# Patient Record
Sex: FEMALE | Race: WHITE | NOT HISPANIC OR LATINO | Employment: FULL TIME | URBAN - METROPOLITAN AREA
[De-identification: names, ages, dates, MRNs, and addresses within clinical notes are randomized per-mention and may not be internally consistent; named-entity substitution may affect disease eponyms.]

---

## 2018-01-03 ENCOUNTER — HOSPITAL ENCOUNTER (EMERGENCY)
Facility: CLINIC | Age: 23
Discharge: HOME | End: 2018-01-03
Attending: EMERGENCY MEDICINE | Admitting: EMERGENCY MEDICINE
Payer: COMMERCIAL

## 2018-01-03 ENCOUNTER — APPOINTMENT (OUTPATIENT)
Dept: CT IMAGING | Facility: CLINIC | Age: 23
End: 2018-01-03
Attending: EMERGENCY MEDICINE
Payer: COMMERCIAL

## 2018-01-03 VITALS
SYSTOLIC BLOOD PRESSURE: 107 MMHG | RESPIRATION RATE: 16 BRPM | TEMPERATURE: 98.3 F | HEART RATE: 75 BPM | OXYGEN SATURATION: 99 % | DIASTOLIC BLOOD PRESSURE: 66 MMHG

## 2018-01-03 DIAGNOSIS — K51.919 ULCERATIVE COLITIS WITH COMPLICATION, UNSPECIFIED LOCATION (H): ICD-10-CM

## 2018-01-03 DIAGNOSIS — R10.30 LOWER ABDOMINAL PAIN: ICD-10-CM

## 2018-01-03 LAB
ALBUMIN SERPL-MCNC: 2.7 G/DL (ref 3.4–5)
ALBUMIN UR-MCNC: NEGATIVE MG/DL
ALP SERPL-CCNC: 46 U/L (ref 40–150)
ALT SERPL W P-5'-P-CCNC: 14 U/L (ref 0–50)
ANION GAP SERPL CALCULATED.3IONS-SCNC: 6 MMOL/L (ref 3–14)
APPEARANCE UR: CLEAR
AST SERPL W P-5'-P-CCNC: 11 U/L (ref 0–45)
BACTERIA #/AREA URNS HPF: ABNORMAL /HPF
BASOPHILS # BLD AUTO: 0.1 10E9/L (ref 0–0.2)
BASOPHILS NFR BLD AUTO: 1.1 %
BILIRUB SERPL-MCNC: 0.3 MG/DL (ref 0.2–1.3)
BILIRUB UR QL STRIP: NEGATIVE
BUN SERPL-MCNC: 7 MG/DL (ref 7–30)
CALCIUM SERPL-MCNC: 8.3 MG/DL (ref 8.5–10.1)
CHLORIDE SERPL-SCNC: 103 MMOL/L (ref 94–109)
CO2 SERPL-SCNC: 25 MMOL/L (ref 20–32)
COLOR UR AUTO: YELLOW
CREAT BLD-MCNC: 0.8 MG/DL (ref 0.52–1.04)
CREAT SERPL-MCNC: 0.73 MG/DL (ref 0.52–1.04)
DIFFERENTIAL METHOD BLD: ABNORMAL
EOSINOPHIL # BLD AUTO: 2 10E9/L (ref 0–0.7)
EOSINOPHIL NFR BLD AUTO: 18.2 %
ERYTHROCYTE [DISTWIDTH] IN BLOOD BY AUTOMATED COUNT: 15.7 % (ref 10–15)
GFR SERPL CREATININE-BSD FRML MDRD: 89 ML/MIN/1.7M2
GFR SERPL CREATININE-BSD FRML MDRD: >90 ML/MIN/1.7M2
GLUCOSE SERPL-MCNC: 89 MG/DL (ref 70–99)
GLUCOSE UR STRIP-MCNC: NEGATIVE MG/DL
HCG UR QL: NEGATIVE
HCT VFR BLD AUTO: 37 % (ref 35–47)
HGB BLD-MCNC: 12.1 G/DL (ref 11.7–15.7)
HGB UR QL STRIP: NEGATIVE
IMM GRANULOCYTES # BLD: 0.1 10E9/L (ref 0–0.4)
IMM GRANULOCYTES NFR BLD: 0.5 %
KETONES UR STRIP-MCNC: 5 MG/DL
LEUKOCYTE ESTERASE UR QL STRIP: NEGATIVE
LIPASE SERPL-CCNC: 127 U/L (ref 73–393)
LYMPHOCYTES # BLD AUTO: 2.2 10E9/L (ref 0.8–5.3)
LYMPHOCYTES NFR BLD AUTO: 19.8 %
MCH RBC QN AUTO: 29.5 PG (ref 26.5–33)
MCHC RBC AUTO-ENTMCNC: 32.7 G/DL (ref 31.5–36.5)
MCV RBC AUTO: 90 FL (ref 78–100)
MONOCYTES # BLD AUTO: 0.9 10E9/L (ref 0–1.3)
MONOCYTES NFR BLD AUTO: 8.3 %
MUCOUS THREADS #/AREA URNS LPF: PRESENT /LPF
NEUTROPHILS # BLD AUTO: 5.8 10E9/L (ref 1.6–8.3)
NEUTROPHILS NFR BLD AUTO: 52.1 %
NITRATE UR QL: NEGATIVE
NRBC # BLD AUTO: 0 10*3/UL
NRBC BLD AUTO-RTO: 0 /100
PH UR STRIP: 6 PH (ref 5–7)
PLATELET # BLD AUTO: 384 10E9/L (ref 150–450)
POTASSIUM SERPL-SCNC: 3.9 MMOL/L (ref 3.4–5.3)
PROT SERPL-MCNC: 6.5 G/DL (ref 6.8–8.8)
RBC # BLD AUTO: 4.1 10E12/L (ref 3.8–5.2)
RBC #/AREA URNS AUTO: <1 /HPF (ref 0–2)
SODIUM SERPL-SCNC: 134 MMOL/L (ref 133–144)
SOURCE: ABNORMAL
SP GR UR STRIP: 1.02 (ref 1–1.03)
SQUAMOUS #/AREA URNS AUTO: 1 /HPF (ref 0–1)
UROBILINOGEN UR STRIP-MCNC: 0 MG/DL (ref 0–2)
WBC # BLD AUTO: 11 10E9/L (ref 4–11)
WBC #/AREA URNS AUTO: 2 /HPF (ref 0–2)

## 2018-01-03 PROCEDURE — 81001 URINALYSIS AUTO W/SCOPE: CPT | Performed by: EMERGENCY MEDICINE

## 2018-01-03 PROCEDURE — 80053 COMPREHEN METABOLIC PANEL: CPT | Performed by: EMERGENCY MEDICINE

## 2018-01-03 PROCEDURE — 96374 THER/PROPH/DIAG INJ IV PUSH: CPT

## 2018-01-03 PROCEDURE — 25000128 H RX IP 250 OP 636: Performed by: EMERGENCY MEDICINE

## 2018-01-03 PROCEDURE — 83690 ASSAY OF LIPASE: CPT | Performed by: EMERGENCY MEDICINE

## 2018-01-03 PROCEDURE — 96361 HYDRATE IV INFUSION ADD-ON: CPT

## 2018-01-03 PROCEDURE — 74177 CT ABD & PELVIS W/CONTRAST: CPT

## 2018-01-03 PROCEDURE — 81025 URINE PREGNANCY TEST: CPT | Performed by: EMERGENCY MEDICINE

## 2018-01-03 PROCEDURE — 96375 TX/PRO/DX INJ NEW DRUG ADDON: CPT

## 2018-01-03 PROCEDURE — 85025 COMPLETE CBC W/AUTO DIFF WBC: CPT | Performed by: EMERGENCY MEDICINE

## 2018-01-03 PROCEDURE — 82565 ASSAY OF CREATININE: CPT

## 2018-01-03 PROCEDURE — 99285 EMERGENCY DEPT VISIT HI MDM: CPT | Mod: 25

## 2018-01-03 RX ORDER — PREDNISONE 10 MG/1
TABLET ORAL
Qty: 32 TABLET | Refills: 0 | Status: SHIPPED | OUTPATIENT
Start: 2018-01-03 | End: 2018-01-13

## 2018-01-03 RX ORDER — OXYCODONE HYDROCHLORIDE 5 MG/1
5 TABLET ORAL EVERY 6 HOURS PRN
Qty: 20 TABLET | Refills: 0 | Status: SHIPPED | OUTPATIENT
Start: 2018-01-03 | End: 2020-10-29 | Stop reason: ALTCHOICE

## 2018-01-03 RX ORDER — ONDANSETRON 2 MG/ML
4 INJECTION INTRAMUSCULAR; INTRAVENOUS ONCE
Status: COMPLETED | OUTPATIENT
Start: 2018-01-03 | End: 2018-01-03

## 2018-01-03 RX ORDER — IOPAMIDOL 755 MG/ML
500 INJECTION, SOLUTION INTRAVASCULAR ONCE
Status: COMPLETED | OUTPATIENT
Start: 2018-01-03 | End: 2018-01-03

## 2018-01-03 RX ORDER — ONDANSETRON 4 MG/1
4 TABLET, ORALLY DISINTEGRATING ORAL EVERY 8 HOURS PRN
Qty: 10 TABLET | Refills: 0 | Status: SHIPPED | OUTPATIENT
Start: 2018-01-03 | End: 2018-01-06

## 2018-01-03 RX ORDER — HYDROMORPHONE HYDROCHLORIDE 1 MG/ML
0.5 INJECTION, SOLUTION INTRAMUSCULAR; INTRAVENOUS; SUBCUTANEOUS ONCE
Status: COMPLETED | OUTPATIENT
Start: 2018-01-03 | End: 2018-01-03

## 2018-01-03 RX ADMIN — SODIUM CHLORIDE 57 ML: 9 INJECTION, SOLUTION INTRAVENOUS at 11:19

## 2018-01-03 RX ADMIN — SODIUM CHLORIDE 1000 ML: 9 INJECTION, SOLUTION INTRAVENOUS at 10:20

## 2018-01-03 RX ADMIN — IOPAMIDOL 69 ML: 755 INJECTION, SOLUTION INTRAVENOUS at 11:19

## 2018-01-03 RX ADMIN — Medication 0.5 MG: at 10:21

## 2018-01-03 RX ADMIN — ONDANSETRON 4 MG: 2 INJECTION INTRAMUSCULAR; INTRAVENOUS at 10:20

## 2018-01-03 ASSESSMENT — ENCOUNTER SYMPTOMS
BLOOD IN STOOL: 1
APPETITE CHANGE: 1
DIARRHEA: 1
VOMITING: 0
NAUSEA: 1
ABDOMINAL PAIN: 1
FEVER: 0

## 2018-01-03 NOTE — ED AVS SNAPSHOT
[facility] Emergency Department    [address] [place] [address]    Phone:  [phone]    Fax:  [phone]                                       Xochitl Caputo   MRN: [id number]    Department:  [facility] Emergency Department   Date of Visit:  1/3/2018           Patient Information     Date Of Birth          1995        Your diagnoses for this visit were:     Lower abdominal pain     Ulcerative colitis with complication, unspecified location (H)        You were seen by Pati Harvey MD.      Follow-up Information     Schedule an appointment as soon as possible for a visit with Kevin Eckert MD.    Specialty:  Gastroenterology    Contact information:    [facility]  [address]  [facility] [address]  [phone]          Discharge Instructions       Please follow up closely with your gastroenterologist. Please return to the ED if your symptoms worsen or if you develop new or concerning symptoms.       Ulcerative Colitis  You have been diagnosed with ulcerative colitis. Ulcerative colitis is a chronic condition that causes inflammation and ulcers in the rectum and colon. It is a form of inflammatory bowel disease (IBD). The disease is usually diagnosed by a special procedure called a colonoscopy. The symptoms usually develop over time. There is no medicine that can cure ulcerative colitis. The goal of treatment is to reduce the symptoms, and cause a remission.  Symptoms of ulcerative colitis include:    Abdominal cramps and pain    Diarrhea, usually bloody    Rectal bleeding    Rectal pain    Fever    Decreased appetite and weight loss    Low energy    Inflammation outside of the colon can occur and can cause pain or swelling in places like the eyes, skin, and joints  Home care  No one knows what exactly causes IBD. The goal is to control and relieve the symptoms, and prevent complications, so you can lead a full and active  "life. No medicine can cure the disease, but in some cases, surgery to remove the whole colon can be curative. However, surgery causes other side effects and so medicines are often preferred. Discuss your options with your healthcare provider.  Diet  Your diet did not cause your condition, but it can affect it. Unfortunately, no one diet that works for everyone, so you have to experiment. Below are some recommendations, but what works for you may be different. Keep a food log to figure out what you are sensitive to.    Eat more slowly. Eat smaller amounts at a time, but more often. Remember, you can always eat more, but can't eat less once you've eaten too much.    High-fiber foods are complicated. While they may help constipation, they can make bloating, cramping, gas, and diarrhea worse.    Eat less sugar.    Try avoiding dairy products if you feel you are sensitive to lactose.    Try cutting out foods that are high in fat and fatty meats.    You can control bloating and passing excess gas. Be careful with \"gassy\" vegetables and fruits like beans, cabbage, broccoli, and cauliflower.    Be careful of carbonated beverages and fruit juices. They can make bloating and diarrhea worse.    Caffeine, alcohol, and stimulants may make symptoms worse.  Lifestyle  Although stress doesn't cause IBD, it is a factor in flare-ups, and how you feel and react to your condition.    Look for things that seem to make your symptoms worse, such as stress and emotions.    Counseling can help you deal with stress. So can self-help measure like exercise, yoga, and meditation.    Depression can be a part of this illness and antidepressant medicine may be prescribed. This may actually help with diarrhea, constipation, and cramping, as well as symptoms of depression.    Smoking can make symptoms worse.    Lack of sleep can make symptoms seem worse.    Alcohol use can make symptoms worse.  Medicines  Your healthcare provider may prescribe " medicines. Take them as directed. In most situations, lifelong medicine is necessary. For acute flares, additional prescription medicines can be prescribed. Call your provider if you need these.    Ask your healthcare provider before taking any medicines for diarrhea.    Avoid anti-inflammatory medicines like ibuprofen or naproxen.    Consider nutritional supplements. This is especially true if the diarrhea is prolonged, or you aren't eating or are losing weight.  Follow-up care  Follow up with your healthcare provider, or as advised. Tell your provider if you lose more than 5 pounds over 3 to 6 months, and you aren't trying to lose weight.  If a stool sample was taken, or cultures were done, you will be told if they are positive, or if your treatment needs to be changed. You can call as directed for results.  If X-rays were done, a radiologist will look at them. You will be told if you need a change in treatment  It is very important to tell your doctor if you intend to get pregnant, or find out you are pregnant. You will need to discuss your disease, medicines, and plan as early as possible and preferably before you conceive.  Call 911  Call 911 if any of these occur:    Trouble breathing    Confusion    Very drowsy or trouble awakening    Fainting or loss of consciousness    Rapid heart rate    Chest pain  When to seek medical advice  Call your healthcare provider right away if any of these occur:    Bleeding from your rectum    Frequent diarrhea or abdominal pain that's not controlled by your medicine    Bloody diarrhea    Fever of 100.4 F (38 C) or higher, or as directed by your health care provider    Persistent nausea or repeated vomiting   Date Last Reviewed: 12/30/2015 2000-2017 [employer]. [address] [place] [address], [place] [address]. All rights reserved. This information is not intended as a substitute for professional medical care. Always follow your healthcare professional's  instructions.          24 Hour Appointment Hotline       To make an appointment at any [facility], call [phone] ([phone]). If you don't have a family doctor or clinic, we will help you find one. [place] clinics are conveniently located to serve the needs of you and your family.             Review of your medicines      START taking        Dose / Directions Last dose taken    ondansetron 4 MG ODT tab   Commonly known as:  ZOFRAN ODT   Dose:  4 mg   Quantity:  10 tablet        Take 1 tablet (4 mg) by mouth every 8 hours as needed   Refills:  0        oxyCODONE IR 5 MG tablet   Commonly known as:  ROXICODONE   Dose:  5 mg   Quantity:  20 tablet        Take 1 tablet (5 mg) by mouth every 6 hours as needed for pain   Refills:  0        predniSONE 10 MG tablet   Commonly known as:  DELTASONE   Quantity:  32 tablet        Take 4 tablets daily for 5 days,  take 2 tablets daily for 3 days, take 1 tablet daily for 3 days, take half a tablet for 3 days.   Refills:  0          Our records show that you are taking the medicines listed below. If these are incorrect, please call your family doctor or clinic.        Dose / Directions Last dose taken    IMURAN PO        Refills:  0        LIALDA PO        Refills:  0                Prescriptions were sent or printed at these locations (3 Prescriptions)                   Other Prescriptions                Printed at Department/Unit printer (3 of 3)         oxyCODONE IR (ROXICODONE) 5 MG tablet               ondansetron (ZOFRAN ODT) 4 MG ODT tab               predniSONE (DELTASONE) 10 MG tablet                Procedures and tests performed during your visit     CBC + differential    CT Abdomen Pelvis w Contrast    Comprehensive metabolic panel    Creatinine POCT    HCG qualitative urine    Lipase    UA with Microscopic      Orders Needing Specimen Collection     None      Pending Results     No orders found from 1/1/2018 to 1/4/2018.            Pending Culture  Results     No orders found from 1/1/2018 to 1/4/2018.            Pending Results Instructions     If you had any lab results that were not finalized at the time of your Discharge, you can call the ED Lab Result RN at [phone]. You will be contacted by this team for any positive Lab results or changes in treatment. The nurses are available 7 days a week from 10A to 6:30P.  You can leave a message 24 hours per day and they will return your call.        Test Results From Your Hospital Stay        1/3/2018 10:50 AM      Component Results     Component Value Ref Range & Units Status    WBC 11.0 4.0 - 11.0 10e9/L Final    RBC Count 4.10 3.8 - 5.2 10e12/L Final    Hemoglobin 12.1 11.7 - 15.7 g/dL Final    Hematocrit 37.0 35.0 - 47.0 % Final    MCV 90 78 - 100 fl Final    MCH 29.5 26.5 - 33.0 pg Final    MCHC 32.7 31.5 - 36.5 g/dL Final    RDW 15.7 (H) 10.0 - 15.0 % Final    Platelet Count 384 150 - 450 10e9/L Final    Diff Method Automated Method  Final    % Neutrophils 52.1 % Final    % Lymphocytes 19.8 % Final    % Monocytes 8.3 % Final    % Eosinophils 18.2 % Final    % Basophils 1.1 % Final    % Immature Granulocytes 0.5 % Final    Nucleated RBCs 0 0 /100 Final    Absolute Neutrophil 5.8 1.6 - 8.3 10e9/L Final    Absolute Lymphocytes 2.2 0.8 - 5.3 10e9/L Final    Absolute Monocytes 0.9 0.0 - 1.3 10e9/L Final    Absolute Eosinophils 2.0 (H) 0.0 - 0.7 10e9/L Final    Absolute Basophils 0.1 0.0 - 0.2 10e9/L Final    Abs Immature Granulocytes 0.1 0 - 0.4 10e9/L Final    Absolute Nucleated RBC 0.0  Final         1/3/2018 11:10 AM      Component Results     Component Value Ref Range & Units Status    Sodium 134 133 - 144 mmol/L Final    Potassium 3.9 3.4 - 5.3 mmol/L Final    Chloride 103 94 - 109 mmol/L Final    Carbon Dioxide 25 20 - 32 mmol/L Final    Anion Gap 6 3 - 14 mmol/L Final    Glucose 89 70 - 99 mg/dL Final    Urea Nitrogen 7 7 - 30 mg/dL Final    Creatinine 0.73 0.52 - 1.04 mg/dL Final    GFR Estimate >90  >60 mL/min/1.7m2 Final    Non  GFR Calc    GFR Estimate If Black >90 >60 mL/min/1.7m2 Final    African American GFR Calc    Calcium 8.3 (L) 8.5 - 10.1 mg/dL Final    Bilirubin Total 0.3 0.2 - 1.3 mg/dL Final    Albumin 2.7 (L) 3.4 - 5.0 g/dL Final    Protein Total 6.5 (L) 6.8 - 8.8 g/dL Final    Alkaline Phosphatase 46 40 - 150 U/L Final    ALT 14 0 - 50 U/L Final    AST 11 0 - 45 U/L Final         1/3/2018 11:10 AM      Component Results     Component Value Ref Range & Units Status    Lipase 127 73 - 393 U/L Final         1/3/2018 11:24 AM      Component Results     Component Value Ref Range & Units Status    Color Urine Yellow  Final    Appearance Urine Clear  Final    Glucose Urine Negative NEG^Negative mg/dL Final    Bilirubin Urine Negative NEG^Negative Final    Ketones Urine 5 (A) NEG^Negative mg/dL Final    Specific Gravity Urine 1.018 1.003 - 1.035 Final    Blood Urine Negative NEG^Negative Final    pH Urine 6.0 5.0 - 7.0 pH Final    Protein Albumin Urine Negative NEG^Negative mg/dL Final    Urobilinogen mg/dL 0.0 0.0 - 2.0 mg/dL Final    Nitrite Urine Negative NEG^Negative Final    Leukocyte Esterase Urine Negative NEG^Negative Final    Source Midstream Urine  Final    WBC Urine 2 0 - 2 /HPF Final    RBC Urine <1 0 - 2 /HPF Final    Bacteria Urine Few (A) NEG^Negative /HPF Final    Squamous Epithelial /HPF Urine 1 0 - 1 /HPF Final    Mucous Urine Present (A) NEG^Negative /LPF Final         1/3/2018 11:02 AM      Component Results     Component Value Ref Range & Units Status    HCG Qual Urine Negative NEG^Negative Final    This test is for screening purposes.  Results should be interpreted along with   the clinical picture.  Confirmation testing is available if warranted by   ordering [id number], HCG Quantitative Pregnancy.           1/3/2018 11:38 AM      Narrative     CT ABDOMEN AND PELVIS WITH CONTRAST   1/3/2018 11:26 AM     HISTORY: History of ulcerative colitis. Low abdominal pain  with  hematochezia.    COMPARISON: None.    TECHNIQUE: Following the uneventful administration of 69 mL Isovue-370  intravenous contrast, helical sections were acquired from the top of  the diaphragm through the pubic symphysis. Coronal reconstructions  were generated. Radiation dose for this scan was reduced using  automated exposure control, adjustment of the mA and/or kV according  to the patient's size, or iterative reconstruction technique.    FINDINGS:     Abdomen: Periportal hepatic edema, likely related to the recent  administration of intravenous fluids. The liver, spleen, pancreas,  adrenal glands and kidneys are otherwise unremarkable. The gallbladder  is present. No enlarged lymph nodes or free fluid in the upper  abdomen.    Scan through the lower chest is unremarkable.    Pelvis: The small and large bowel are normal in caliber. Diffuse  moderate wall thickening and hyperenhancement of the colon. The  appendix is not visualized. No pneumatosis or free intraperitoneal  gas. The uterus is present. No enlarged lymph nodes in the pelvis. A  trace amount of free fluid in the pelvis.        Impression     IMPRESSION:   1. Diffuse moderate wall thickening and hyperenhancement of the colon,  consistent with inflammatory or infectious colitis.  2. A trace amount of free fluid in the pelvis.  3. No other cause of acute pain identified in the abdomen or pelvis.    JORGE L JACOBSON MD         1/3/2018 10:31 AM      Component Results     Component Value Ref Range & Units Status    Creatinine 0.8 0.52 - 1.04 mg/dL Final    GFR Estimate 89 >60 mL/min/1.7m2 Final    GFR Estimate If Black >90 >60 mL/min/1.7m2 Final                Clinical Quality Measure: Blood Pressure Screening     Your blood pressure was checked while you were in the emergency department today. The last reading we obtained was  BP: 105/67 . Please read the guidelines below about what these numbers mean and what you should do about them.  If your  "systolic blood pressure (the top number) is less than 120 and your diastolic blood pressure (the bottom number) is less than 80, then your blood pressure is normal. There is nothing more that you need to do about it.  If your systolic blood pressure (the top number) is 120-139 or your diastolic blood pressure (the bottom number) is 80-89, your blood pressure may be higher than it should be. You should have your blood pressure rechecked within a year by a primary care provider.  If your systolic blood pressure (the top number) is 140 or greater or your diastolic blood pressure (the bottom number) is 90 or greater, you may have high blood pressure. High blood pressure is treatable, but if left untreated over time it can put you at risk for heart attack, stroke, or kidney failure. You should have your blood pressure rechecked by a primary care provider within the next 4 weeks.  If your provider in the emergency department today gave you specific instructions to follow-up with your doctor or provider even sooner than that, you should follow that instruction and not wait for up to 4 weeks for your follow-up visit.        Thank you for choosing [facility]       Thank you for choosing [facility] for your care. Our goal is always to provide you with excellent care. Hearing back from our patients is one way we can continue to improve our services. Please take a few minutes to complete the written survey that you may receive in the mail after you visit with us. Thank you!        [employer] Information     [facility] lets you send messages to your doctor, view your test results, renew your prescriptions, schedule appointments and more. To sign up, go to [email or web address] . Click on \"Log in\" on the left side of the screen, which will take you to the Welcome page. Then click on \"Sign up Now\" on the right side of the page.     You will be asked to enter the access code listed below, as well as some personal information. Please " follow the directions to create your username and password.     Your access code is: [email or web address]  Expires: 4/3/2018 12:45 PM     Your access code will  in 90 days. If you need help or a new code, please call your [place] clinic or [phone].        Care EveryWhere ID     This is your Care EveryWhere ID. This could be used by other organizations to access your [place] medical records  [id number]        Equal Access to Services     [facility] FRANCESCA : Hadii aad ku hadasho Soomaali, waaxda luqadaha, qaybta kaalmada adeegyatre, jeffery huizar . So [facility] [phone].    ATENCIÓN: Si habla español, tiene a erazo disposición servicios gratuitos de asistencia lingüística. Llame al [phone].    We comply with applicable federal civil rights laws and [place] laws. We do not discriminate on the basis of race, color, national origin, age, disability, sex, sexual orientation, or gender identity.            After Visit Summary       This is your record. Keep this with you and show to your community pharmacist(s) and doctor(s) at your next visit.

## 2018-01-03 NOTE — ED PROVIDER NOTES
History     Chief Complaint:  Abdominal Pain    HPI   Xochitl Caputo is a 22 year old female, with a history of ulcerative colitis, who presents with abdominal pain. The patient states that she was diagnosed with ulcerative colitis in August 2017 and has followed with GI at [facility]. She was recently prescribed Imuran 2.5 weeks ago by her doctor in addition to prescription medication of Lialda. The patient states that she will have intermittent episodes of sudden abdominal pain that relieves with a bowel movement. She has talked to her GI specialist about these episodes who had lowered her dosage of Lialda, but she notes she has had little relief. Last night, although, she would have an onset of sudden abdominal pain and then have an episode of bloody diarrhea every half hour. She had a lab appointment this morning and went to see her GI specialist, but they were not there, prompting her ED visit. Here, the patient additionally endorses severe nausea and decreased appetite, but denies vomiting.       Allergies:  NKDA     Medications:    Imuran  Lialda    Past Medical History:    Ulcerative colitis    Past Surgical History:    Eye surgery  Right ankle surgery    Family History:    No past pertinent family history.     Social History:  Presents with her mother.    Negative for alcohol use.  Negative for tobacco use.   Marital Status:  Single [1]     Review of Systems   Constitutional: Positive for appetite change. Negative for fever.   Gastrointestinal: Positive for abdominal pain, blood in stool, diarrhea and nausea. Negative for vomiting.   All other systems reviewed and are negative.      Physical Exam   First Vitals:  BP: 117/75  Heart Rate: 80  Temp: 97.8  F (36.6  C)  Resp: 16  SpO2: 100 %    Physical Exam  Constitutional: The patient is oriented to person, place, and time. Alert and cooperative.  HENT:   Right Ear: External ear normal.   Left Ear: External ear normal.   Nose: Nose normal.    Mouth/Throat: Uvula is midline, oropharynx is clear and moist and mucous membranes are normal. No posterior oropharyngeal edema or erythema.   Eyes: Conjunctivae, EOM and lids are normal. Pupils are equal, round, and reactive to light.   Neck: Trachea normal. Normal range of motion. Neck supple.   Cardiovascular: Normal rate, regular rhythm, normal heart sounds, and intact distal pulses.    Pulmonary/Chest: Effort normal and breath sounds equal bilaterally. No crackles or wheezing.   Abdominal: Soft. Mild generalized tenderness to palpation, but worse in the LLQ. No rebound and no guarding.   Musculoskeletal: Normal range of motion.  No extremity tenderness or edema.  Neurological: Alert and Oriented. Strength 5/5 in upper and lower extremities bilaterally. Sensation intact to light touch throughout.  Skin: Skin is dry. No rash noted.          Emergency Department Course     Imaging:  Radiographic findings were communicated with the patient who voiced understanding of the findings.  CT Abdomen/Pelvis with IV contrast:   1. Diffuse moderate wall thickening and hyperenhancement of the colon,  consistent with inflammatory or infectious colitis.  2. A trace amount of free fluid in the pelvis.  3. No other cause of acute pain identified in the abdomen or pelvis. As per radiology.    Laboratory:  CBC: WBC: 11.0, HGB: 12.1, PLT: 384  CMP: Calcium: 8.3 (L), Albumin: 2.7 (L), Protein Total: 6.5 (L), o/w WNL (Creatinine: 0.73)    Lipase: 127  Creatinine POCT: 0.8  UA with micro: few bacteria, mucous present o/w negative  HCG qualitative: Negative    Interventions:   1020 0.9% Sodium Chloride Bolus, 1L, IV   1020 Zofran, 4 mg, IV   1021 Dilaudid, 0.5 mg, IV      Emergency Department Course:  Nursing notes and vitals reviewed.     0950  I performed an exam of the patient as documented above.     IV inserted. Medicine administered as documented above. Blood drawn. This was sent to the lab for further testing, results  above.    The patient was sent for an abdominal CT while in the emergency department, findings above.     1143 I rechecked the patient and discussed the results of her workup thus far.     1145 I consulted with Dr. Hinds, radiology, regarding the patient's scan.     1200 I consulted with Dr. Reilly, GI, regarding the patient's history and presentation here in the emergency department.     1205 I rechecked the patient and discussed the results of their workup thus far.      Findings and plan explained to the Patient. Patient discharged home with instructions regarding supportive care, medications, and reasons to return. The importance of close follow-up was reviewed. The patient was prescribed Zofran, Oxycodone, and prednisone.    I personally reviewed the laboratory results with the Patient and answered all related questions prior to discharge.     Impression & Plan      Medical Decision Making:  Xochitl Caputo is a 22 year old female with a history of ulcerative colitis who presents to the ED for evaluation of abdominal pain and diarrhea. Upon presentation to the ED, the patient is nontoxic appearing. Her vital signs are within normal limits and stable. On exam, she is well appearing. She is alert, oriented, and neuro exam is nonfocal. Cardiopulmonary exam is unremarkable. On abdominal examination, she does endorse diffuse mild tenderness to palpation, but worse in the left lower quadrant. There is no rebound or guarding. The rest of her exam is as mentioned above. Labs were obtained and are as mentioned above. She does not have a leukocytosis. CT of the abdomen was obtained and demonstrates diffuse wall thickening and hyperenhancement of colon, consistent with her history of inflammatory colitis. There are no other acute abnormalities in the abdomen noted. Although, the appendix was not specifically visualized, I did discuss the patient with the reading radiologist and he notes there are no signs to suggest  inflammation in the right lower quadrant to suggest acute appendicitis. Given, the patient's history and presentation, I do feel her symptoms are most consistent with an ulcerative colitis flare. I did discuss the patient with the gastroenterologist for her GI physician. He does note that the patient has been resistant to taking oral steroids in the past, but states that if she is willing to take oral steroids she can be discharged to home with prednisone and close follow up in clinic. I discussed this thoroughly with the patient and is in agreement with this plan. She did agree to start the oral steroids. Return instructions were given. The patient was stable/improved at the time of discharge.     Diagnosis:    ICD-10-CM   1. Lower abdominal pain R10.30   2. Ulcerative colitis with complication, unspecified location (H) K51.919       Disposition:  discharged to home    Discharge Medications:  New Prescriptions    ONDANSETRON (ZOFRAN ODT) 4 MG ODT TAB    Take 1 tablet (4 mg) by mouth every 8 hours as needed    OXYCODONE IR (ROXICODONE) 5 MG TABLET    Take 1 tablet (5 mg) by mouth every 6 hours as needed for pain    PREDNISONE (DELTASONE) 10 MG TABLET    Take 4 tablets daily for 5 days,  take 2 tablets daily for 3 days, take 1 tablet daily for 3 days, take half a tablet for 3 days.     I, Haydee Clemente, am serving as a scribe on 1/3/2018 at 9:50 AM to personally document services performed by Pati Harvey MD based on my observations and the provider's statements to me.      Haydee Clemente  1/3/2018   [facility] EMERGENCY DEPARTMENT       Pati Harvey MD  01/04/18 1800

## 2018-01-03 NOTE — DISCHARGE INSTRUCTIONS
Please follow up closely with your gastroenterologist. Please return to the ED if your symptoms worsen or if you develop new or concerning symptoms.       Ulcerative Colitis  You have been diagnosed with ulcerative colitis. Ulcerative colitis is a chronic condition that causes inflammation and ulcers in the rectum and colon. It is a form of inflammatory bowel disease (IBD). The disease is usually diagnosed by a special procedure called a colonoscopy. The symptoms usually develop over time. There is no medicine that can cure ulcerative colitis. The goal of treatment is to reduce the symptoms, and cause a remission.  Symptoms of ulcerative colitis include:    Abdominal cramps and pain    Diarrhea, usually bloody    Rectal bleeding    Rectal pain    Fever    Decreased appetite and weight loss    Low energy    Inflammation outside of the colon can occur and can cause pain or swelling in places like the eyes, skin, and joints  Home care  No one knows what exactly causes IBD. The goal is to control and relieve the symptoms, and prevent complications, so you can lead a full and active life. No medicine can cure the disease, but in some cases, surgery to remove the whole colon can be curative. However, surgery causes other side effects and so medicines are often preferred. Discuss your options with your healthcare provider.  Diet  Your diet did not cause your condition, but it can affect it. Unfortunately, no one diet that works for everyone, so you have to experiment. Below are some recommendations, but what works for you may be different. Keep a food log to figure out what you are sensitive to.    Eat more slowly. Eat smaller amounts at a time, but more often. Remember, you can always eat more, but can't eat less once you've eaten too much.    High-fiber foods are complicated. While they may help constipation, they can make bloating, cramping, gas, and diarrhea worse.    Eat less sugar.    Try avoiding dairy products if  "you feel you are sensitive to lactose.    Try cutting out foods that are high in fat and fatty meats.    You can control bloating and passing excess gas. Be careful with \"gassy\" vegetables and fruits like beans, cabbage, broccoli, and cauliflower.    Be careful of carbonated beverages and fruit juices. They can make bloating and diarrhea worse.    Caffeine, alcohol, and stimulants may make symptoms worse.  Lifestyle  Although stress doesn't cause IBD, it is a factor in flare-ups, and how you feel and react to your condition.    Look for things that seem to make your symptoms worse, such as stress and emotions.    Counseling can help you deal with stress. So can self-help measure like exercise, yoga, and meditation.    Depression can be a part of this illness and antidepressant medicine may be prescribed. This may actually help with diarrhea, constipation, and cramping, as well as symptoms of depression.    Smoking can make symptoms worse.    Lack of sleep can make symptoms seem worse.    Alcohol use can make symptoms worse.  Medicines  Your healthcare provider may prescribe medicines. Take them as directed. In most situations, lifelong medicine is necessary. For acute flares, additional prescription medicines can be prescribed. Call your provider if you need these.    Ask your healthcare provider before taking any medicines for diarrhea.    Avoid anti-inflammatory medicines like ibuprofen or naproxen.    Consider nutritional supplements. This is especially true if the diarrhea is prolonged, or you aren't eating or are losing weight.  Follow-up care  Follow up with your healthcare provider, or as advised. Tell your provider if you lose more than 5 pounds over 3 to 6 months, and you aren't trying to lose weight.  If a stool sample was taken, or cultures were done, you will be told if they are positive, or if your treatment needs to be changed. You can call as directed for results.  If X-rays were done, a radiologist " will look at them. You will be told if you need a change in treatment  It is very important to tell your doctor if you intend to get pregnant, or find out you are pregnant. You will need to discuss your disease, medicines, and plan as early as possible and preferably before you conceive.  Call 911  Call 911 if any of these occur:    Trouble breathing    Confusion    Very drowsy or trouble awakening    Fainting or loss of consciousness    Rapid heart rate    Chest pain  When to seek medical advice  Call your healthcare provider right away if any of these occur:    Bleeding from your rectum    Frequent diarrhea or abdominal pain that's not controlled by your medicine    Bloody diarrhea    Fever of 100.4 F (38 C) or higher, or as directed by your health care provider    Persistent nausea or repeated vomiting   Date Last Reviewed: 12/30/2015 2000-2017 [employer]. [address] [place] [address], [place] [address]. All rights reserved. This information is not intended as a substitute for professional medical care. Always follow your healthcare professional's instructions.

## 2018-01-03 NOTE — ED NOTES
Patient with known ulcerative colitis, just started imuran 2 1/2 weeks ago. Feeling worse, more cramping, loose stools, urgent stools and occasional incontinent stools. Some nausea with cramps. Up every 30 minutes last night with bloody loose stools.

## 2018-01-03 NOTE — ED AVS SNAPSHOT
[facility] Emergency Department    [address]    [facility] [address]    Phone:  [phone]    Fax:  [phone]                                       Xochitl Caputo   MRN: [id number]    Department:  [facility] Emergency Department   Date of Visit:  1/3/2018           After Visit Summary Signature Page     I have received my discharge instructions, and my questions have been answered. I have discussed any challenges I see with this plan with the nurse or doctor.    ..........................................................................................................................................  Patient/Patient Representative Signature      ..........................................................................................................................................  Patient Representative Print Name and Relationship to Patient    ..................................................               ................................................  Date                                            Time    ..........................................................................................................................................  Reviewed by Signature/Title    ...................................................              ..............................................  Date                                                            Time

## 2018-01-03 NOTE — ED NOTES
She was seen at [facility] today but did not actually see the doctor (wasn't there) but did a lab draw and hemoglobin was 12.7

## 2018-01-03 NOTE — LETTER
January 3, 2018      To Whom It May Concern:      Xochitl Caputo was seen in our Emergency Department today, 01/03/18.  I expect her condition to improve over the next 1-2 days.  She may return to work/school when improved.    Sincerely,        Vic Vitale RN

## 2020-09-16 NOTE — TELEPHONE ENCOUNTER
RECORDS RECEIVED FROM: N/A   DATE RECEIVED: 10/21/2020   NOTES STATUS DETAILS   OFFICE NOTE from referring provider N/A    OFFICE NOTE from other specialist Care Everywhere 8/20/2020, 3/3/2020, 1/2/2020 Office visit with Kim Banerjee Np ([facility])     8/4/2020 Office visit with Aparna Diaz ([facility])    5/8/2020, 11/7/19, 11/5/18 Office visit with TORRES Talbert CNP ([facility])     5/16/19, 8/1/17 Office visit with Dr. Jillian Tejada ([facility])    8/8/18, 5/15/18, 2/20/18, 8/8/17 Office visit with Dr. Kevin Eckert ([facility])     3/12/18, 12/15/17, 10/13/17 Office visit with TORRES Chaudhary CNP ([facility])      DISCHARGE SUMMARY from hospital Internal 1/3/18, 1/3/18 ([facility])    OPERATIVE REPORT Care Everywhere    MEDICATION LIST Internal/ Care Everywhere         ENDOSCOPY  N/A    COLONOSCOPY Care Everywhere 9/4/19, 8/16/17 ([facility])   ERCP N/A    EUS N/A    STOOL TESTING Care Everywhere 10/7/18   PERTINENT LABS Care Everywhere    PATHOLOGY REPORTS (RELATED) N/A    IMAGING (CT, MRI, EGD) Internal CT Abdomen Pelvis: 1/3/18     REFERRAL INFORMATION    Date referral was placed:    Date all records received:    Date records were scanned into EPIC:    Date records were sent to Provider to review:    Date and recommendation received from provider:  LETTER SENT  SCHEDULE APPOINTMENT   Date patient was contacted to schedule:

## 2020-10-21 ENCOUNTER — PRE VISIT (OUTPATIENT)
Dept: GASTROENTEROLOGY | Facility: CLINIC | Age: 25
End: 2020-10-21

## 2020-10-21 ENCOUNTER — VIRTUAL VISIT (OUTPATIENT)
Dept: GASTROENTEROLOGY | Facility: CLINIC | Age: 25
End: 2020-10-21
Payer: COMMERCIAL

## 2020-10-21 VITALS — WEIGHT: 130 LBS | HEIGHT: 67 IN | BODY MASS INDEX: 20.4 KG/M2

## 2020-10-21 DIAGNOSIS — K51.00 ULCERATIVE PANCOLITIS (H): Primary | ICD-10-CM

## 2020-10-21 PROCEDURE — 99205 OFFICE O/P NEW HI 60 MIN: CPT | Mod: 95 | Performed by: INTERNAL MEDICINE

## 2020-10-21 RX ORDER — SPIRONOLACTONE 50 MG/1
50 TABLET, FILM COATED ORAL
COMMUNITY
Start: 2018-12-01 | End: 2024-01-22

## 2020-10-21 RX ORDER — PREDNISONE 10 MG/1
20 TABLET ORAL
COMMUNITY
Start: 2020-09-16 | End: 2020-10-29

## 2020-10-21 RX ORDER — PHENOL 1.4 %
600 AEROSOL, SPRAY (ML) MUCOUS MEMBRANE DAILY
COMMUNITY

## 2020-10-21 RX ORDER — MULTIPLE VITAMINS W/ MINERALS TAB 9MG-400MCG
1 TAB ORAL 2 TIMES DAILY
COMMUNITY
Start: 2019-05-16

## 2020-10-21 RX ORDER — COLLAGEN, HYDROLYSATE (BOVINE) 100 %
1 POWDER (GRAM) MISCELLANEOUS
COMMUNITY
End: 2020-10-29

## 2020-10-21 RX ORDER — CLINDAMYCIN PHOSPHATE 10 MG/G
GEL TOPICAL
COMMUNITY
Start: 2020-05-18 | End: 2021-08-18

## 2020-10-21 RX ORDER — PHENOL 1.4 %
1 AEROSOL, SPRAY (ML) MUCOUS MEMBRANE
COMMUNITY
Start: 2018-09-01 | End: 2021-12-17

## 2020-10-21 RX ORDER — SACCHAROMYCES BOULARDII 250 MG
1 CAPSULE ORAL
COMMUNITY
Start: 2019-09-20 | End: 2021-08-18

## 2020-10-21 RX ORDER — NALTREXONE HYDROCHLORIDE 50 MG/1
TABLET, FILM COATED ORAL
COMMUNITY
Start: 2020-06-25 | End: 2021-08-18

## 2020-10-21 RX ORDER — LEVOTHYROXINE SODIUM 50 UG/1
50 TABLET ORAL DAILY
COMMUNITY
Start: 2019-01-01

## 2020-10-21 ASSESSMENT — MIFFLIN-ST. JEOR: SCORE: 1362.31

## 2020-10-21 ASSESSMENT — PAIN SCALES - GENERAL: PAINLEVEL: NO PAIN (0)

## 2020-10-21 NOTE — PROGRESS NOTES
"Xochitl Caputo is a 25 year old female who is being evaluated via a billable video visit.      The patient has been notified of following:     \"This video visit will be conducted via a call between you and your physician/provider. We have found that certain health care needs can be provided without the need for an in-person physical exam.  This service lets us provide the care you need with a video conversation.  If a prescription is necessary we can send it directly to your pharmacy.  If lab work is needed we can place an order for that and you can then stop by our lab to have the test done at a later time.    Video visits are billed at different rates depending on your insurance coverage.  Please reach out to your insurance provider with any questions.    If during the course of the call the physician/provider feels a video visit is not appropriate, you will not be charged for this service.\"    Patient has given verbal consent for Video visit? Yes  How would you like to obtain your AVS? MyChart  If you are dropped from the video visit, the video invite should be resent to: Text to cell phone: [phone]  Will anyone else be joining your video visit? No        [facility] NEW       PATIENT: Xochitl Caputo    MRN: [id number]    Date of Birth 1995    Tel: [phone] (home) NONE (work)    PCP: Jillian Tejada MD     HPI: Ms. Caputo is a 25 year old female here to establish care for UC.     UC history  Was sick before she was diagnosed in 2017. She was seen at [facility] prior to this and was told she had IBS and dehydration.  Hgb was 5.9. Switched care to [facility]. Tried Lialda, imuran, Humira. Transitioned to Entyvio     and has been in symptomatic remission from 11/2018-6/2020. Around June, developed a flare in the setting of psychological stress (pandemic, riots, etc). Lost 22 lb, was on 2 courses prednisone (9/17-present). Currently on 20 mg daily.  Had 3 Entyvio infusions on q4w " "dosing at this point. Had a flex sig 9/16/2020 (below).     Screening appointment for FMT study here at the .   8/2018 established care with [facility] medicine (integrative medicine) which helped. Switched to [facility] about a year ago.     Meds: Entyvio q4w, low dose naltrexone, levothyroxine, spirinolactone (acne)  Supplements: inflamax (powder), fish oil, reversatrol extract, tumeric with black pepper (740 mg), HENRY Glasgow, VSL #3     Gets acupuncture and recently started LED light therapy (a belt of LED lights)    Macroscopic extent of disease (most recent) E3    Current UC symptoms    Bowel frequency in day 3-4   Bowel frequency in night none  Urgency of defecation YES occasionally, mild   Blood in stool only with wiping   General well being 2 = poor  Extracolonic features (multiple select) none     Constitutional symptoms:  Fever NO  Weight loss YES 22 lb since June     Noteworthy diet history- GFD, very low dairy, avoids eggs, avoid processed foods/sugars. Avoids raw veggies when flaring.     Total number of IBD surgeries (except perianal): 0    Current IBD Medications:  Entyvio q4w  Prednisone 20 mg daily     Past IBD Medications:   Imuran - got PNA, \"didn't feel like myself\"   Humira -- primary nonresponder   Lialda    Past Medical History:   Diagnosis Date     Acne      Hypothyroidism      Ulcerative colitis (H)      Ulcerative colitis (H) 10/28/2020        Past Surgical History:   Procedure Laterality Date     EYE SURGERY       ORTHOPEDIC SURGERY      right ankle.       Social History     Tobacco Use     Smoking status: Never Smoker     Smokeless tobacco: Never Used   Substance Use Topics     Alcohol use: Not on file       Family History   Problem Relation Age of Onset     Colitis Maternal Grandfather      Ulcerative Colitis Maternal Grandfather      Crohn's Disease No family hx of      GERD No family hx of      Stomach Cancer No family hx of        No Known Allergies     Outpatient " Encounter Medications as of 10/21/2020   Medication Sig Dispense Refill     Black Pepper-Turmeric 5-1000 MG CAPS Take 2,000 mg by mouth daily        calcium carbonate (OS-KATIA) 1500 (600 Ca) MG tablet Take 600 mg by mouth daily        cannabidiol (EPIDIOLEX) 100 MG/ML oral solution Take 30 mg by mouth       clindamycin (CLINDAMAX) 1 % external gel APPLY TO FACE TWICE A DAY FOR ACNE.       levothyroxine (SYNTHROID/LEVOTHROID) 50 MCG tablet Take 50 mcg by mouth       multivitamin w/minerals (THERA-VIT-M) tablet Take 1 tablet by mouth 2 times daily        naltrexone (DEPADE/REVIA) 50 MG tablet Low Dose Naltrexone compounded to 4.5mg. Take 1 capsule daily by mouth at bedtime.       Omega-3 Fatty Acids (OMEGA-3 EPA FISH OIL PO) Take 1,640 mg by mouth daily        Resveratrol 250 MG CAPS Take 1 capsule by mouth       saccharomyces boulardii (FLORASTOR) 250 MG capsule Take 1 capsule by mouth       spironolactone (ALDACTONE) 50 MG tablet Take 50 mg by mouth       Vedolizumab (ENTYVIO IV) Inject 300 mg into the vein every 28 days        [DISCONTINUED] Collagen Hydrolysate POWD Take 1 Dose by mouth       [DISCONTINUED] predniSONE (DELTASONE) 10 MG tablet 20 mg        [DISCONTINUED] AzaTHIOprine (IMURAN PO)        [DISCONTINUED] Mesalamine (LIALDA PO)        [DISCONTINUED] oxyCODONE IR (ROXICODONE) 5 MG tablet Take 1 tablet (5 mg) by mouth every 6 hours as needed for pain 20 tablet 0     No facility-administered encounter medications on file as of 10/21/2020.       NSAID  none    Review of Systems  Complete 10 System ROS performed. All are negative except as documented below, in the HPI, or in patient questionnaire from today's visit.    1) Constitutional: No fevers, chills, night sweats or malaise, weight loss or gain  2) Skin: No rash  3) Pulmonary: No wheeze, SOB, cough, sputum or hemoptysis  4) Cardiovascular: No Chest pain or palpitations  5) Genitourinary: No blood in urine or dysuria  6) Endocrine: No increased sweating,  "hunger, thirst or thyroid problems  7) Hematologic: No bruising and easy bleeding  8) Musculoskeletal: no new pain in joints or limitation in ROM  9) Neurologic: No dizziness, paresthesias or weakness or falls  10) Psychiatric:  not depressed/anxious, no sleep problems    PHYSICAL EXAM  Vitals: Ht 1.702 m (5' 7\")   Wt 59 kg (130 lb)   BMI 20.36 kg/m      No Pain (0)       General appearance  Healthy appearing adult, in no acute distress     Eyes  Sclera anicteric  Pupils round and reactive to light     Ears, nose, mouth and throat  No obvious external lesions of ears and nose  Hearing intact     Neck  Symmetric  No obvious external lesions     Respiratory  Normal respiration, no use of accessory muscles      MSK  Gait normal     Skin  No rashes or jaundice      Psychiatric  Oriented to person, place and time  Appropriate mood and affect.     DATA:  Reviewed in detail past documentation, medications and prior workup available in electronic health records or through outside records.    PERTINENT STUDIES:  C diff neg 8/2020   Fecal calprotectin 6/2020 74   Labs 6/2020 normal.       Most recent CBC:  WBC   Date Value Ref Range Status   01/03/2018 11.0 4.0 - 11.0 10e9/L Final   ]  Hemoglobin   Date Value Ref Range Status   01/03/2018 12.1 11.7 - 15.7 g/dL Final   ]   Platelet Count   Date Value Ref Range Status   01/03/2018 384 150 - 450 10e9/L Final     Most recent hepatic panel:  AST   Date Value Ref Range Status   01/03/2018 11 0 - 45 U/L Final     ALT   Date Value Ref Range Status   01/03/2018 14 0 - 50 U/L Final     No results found for: BILICONJ   Bilirubin Total   Date Value Ref Range Status   01/03/2018 0.3 0.2 - 1.3 mg/dL Final     Albumin   Date Value Ref Range Status   01/03/2018 2.7 (L) 3.4 - 5.0 g/dL Final     Alkaline Phosphatase   Date Value Ref Range Status   01/03/2018 46 40 - 150 U/L Final       Most recent creatinine:  Creatinine   Date Value Ref Range Status   01/03/2018 0.73 0.52 - 1.04 mg/dL Final "       Endoscopy:     Flex sig 9/2020 showed Tolliver 3 colitis from rectum to descending colon    icscope 9/2019:  Impression:   - The examined portion of the ileum                        was normal.                       - One 10 to 11 mm polyp in the                        sigmoid colon, removed with a hot                        snare. Resected and retrieved.                       - Pancolitis. Inflammation was found                        from the anus to the cecum. This was                        mild in severity and graded as Tolliver                        Score 1 (mild disease). Biopsied.      icscope 8/2017 showed E3 colitis (severity unclear): Congested, erythematous and eroded                        mucosa in the entire examined colon.                        Biopsied from a) right colon and b)                        left colon. Findings consistent with                        ulcerative pan colitis.    Specimens:   A) - Colon, , Right colon bx's                                                                      B) - Colon, , Left colon bx's                                                                       C) - Colon, sigmoid                                                                        FINAL DIAGNOSIS A.  Colon, right, biopsy -- Colonic mucosal fragments with minimal crypt architectural distortion and minimal, focal activity    B.  Colon, left, biopsy -- Colonic mucosal fragments with minimal crypt architectural distortion and minimal, focal activity    C.  Colon, sigmoid, polypectomy -- Inflammatory polyp       IMPRESSION:    Ms. 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 She is planning to participate in the FMT for UC trial here, but I am concerned that she is doing too unwell at this point to commit to 6 weeks of her current therapy, especially since she is on prednisone. I suspect we will need to switch biologics (likely to Stelara, since she  was a primary nonresponder to Humira). 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 If she experiences significant fatigue, will check a cortisol level and refer to endocrinology   ---Referrals Rani Casillas ([phone]) and Nina Garcia, PharmD for healthcare maintenance     Misc:  -- Avoid tobacco use  -- Avoid NSAIDs as there is potentially a 25% chance of causing an IBD flare    Return in about 6 weeks (around 12/2/2020).    Poonam Dunbar MD   of Medicine  Division of Gastroenterology, Hepatology and Nutrition  [facility]    October 21, 2020      Video-Visit Details    Type of service:  Video Visit    Video Start Time: 3:50 PM  Video End Time: 4:56 PM    Originating Location (pt. Location): Home    Distant Location (provider location):  [facility] [place]     Platform used for Video Visit: [employer]

## 2020-10-21 NOTE — NURSING NOTE
"Chief Complaint   Patient presents with     New Patient     ulcerative colitis       Vitals:    10/21/20 1523   Weight: 59 kg (130 lb)   Height: 1.702 m (5' 7\")       Body mass index is 20.36 kg/m .      Bryant Ramirez LPN                        "

## 2020-10-21 NOTE — PATIENT INSTRUCTIONS
PLAN  ---Will likely need to switch off Entyvio (Alexandra)  ---Plan will be to continue prednisone taper with the goal of being off prior to FMT trial  20 x 3  15 x 5  10 x 5  5 x 3    ---Watch out for adrenal insufficiency   ---Referrals Rani Casillas ([phone]) and Nina Garcia, PharmD   ---Blood work   ---Fecal calprotectin

## 2020-10-21 NOTE — LETTER
"10/21/2020     RE: Xochitl Caputo  [address]  [place] [phone]    Dear Colleague,    Thank you for referring your patient, Xochitl Caputo, to the [facility] [place]. Please see a copy of my visit note below.    Xochitl Caputo is a 25 year old female who is being evaluated via a billable video visit.      The patient has been notified of following:     \"This video visit will be conducted via a call between you and your physician/provider. We have found that certain health care needs can be provided without the need for an in-person physical exam.  This service lets us provide the care you need with a video conversation.  If a prescription is necessary we can send it directly to your pharmacy.  If lab work is needed we can place an order for that and you can then stop by our lab to have the test done at a later time.    Video visits are billed at different rates depending on your insurance coverage.  Please reach out to your insurance provider with any questions.    If during the course of the call the physician/provider feels a video visit is not appropriate, you will not be charged for this service.\"    Patient has given verbal consent for Video visit? Yes  How would you like to obtain your AVS? MyChart  If you are dropped from the video visit, the video invite should be resent to: Text to cell phone: [phone]  Will anyone else be joining your video visit? No        [facility]       PATIENT: Xochitl Caputo    MRN: [id number]    Date of Birth 1995    Tel: [phone] (home) NONE (work)    PCP: Jillian Tejada MD     HPI: Ms. Caputo is a 25 year old female here to establish care for UC.     UC history  Was sick before she was diagnosed in 2017. She was seen at [facility] prior to this and was told she had IBS and dehydration.  Hgb was 5.9. Switched care to [facility]. Tried Lialda, imuran, Humira. Transitioned to Entyvio   " "  and has been in symptomatic remission from 11/2018-6/2020. Around June, developed a flare in the setting of psychological stress (pandemic, riots, etc). Lost 22 lb, was on 2 courses prednisone (9/17-present). Currently on 20 mg daily.  Had 3 Entyvio infusions on q4w dosing at this point. Had a flex sig 9/16/2020 (below).     Screening appointment for FMT study here at the .   8/2018 established care with [facility] medicine (integrative medicine) which helped. Switched to [facility] about a year ago.     Meds: Entyvio q4w, low dose naltrexone, levothyroxine, spirinolactone (acne)  Supplements: inflamax (powder), fish oil, reversatrol extract, tumeric with black pepper (740 mg), S. Boulardi, VSL #3     Gets acupuncture and recently started LED light therapy (a belt of LED lights)    Macroscopic extent of disease (most recent) E3    Current UC symptoms    Bowel frequency in day 3-4   Bowel frequency in night none  Urgency of defecation YES occasionally, mild   Blood in stool only with wiping   General well being 2 = poor  Extracolonic features (multiple select) none     Constitutional symptoms:  Fever NO  Weight loss YES 22 lb since June     Noteworthy diet history- GFD, very low dairy, avoids eggs, avoid processed foods/sugars. Avoids raw veggies when flaring.     Total number of IBD surgeries (except perianal): 0    Current IBD Medications:  Entyvio q4w  Prednisone 20 mg daily     Past IBD Medications:   Imuran - got PNA, \"didn't feel like myself\"   Humira -- primary nonresponder   Lialda    Past Medical History:   Diagnosis Date     Acne      Hypothyroidism      Ulcerative colitis (H)      Ulcerative colitis (H) 10/28/2020        Past Surgical History:   Procedure Laterality Date     EYE SURGERY       ORTHOPEDIC SURGERY      right ankle.       Social History     Tobacco Use     Smoking status: Never Smoker     Smokeless tobacco: Never Used   Substance Use Topics     Alcohol use: Not on file "       Family History   Problem Relation Age of Onset     Colitis Maternal Grandfather      Ulcerative Colitis Maternal Grandfather      Crohn's Disease No family hx of      GERD No family hx of      Stomach Cancer No family hx of        No Known Allergies     Outpatient Encounter Medications as of 10/21/2020   Medication Sig Dispense Refill     Black Pepper-Turmeric 5-1000 MG CAPS Take 2,000 mg by mouth daily        calcium carbonate (OS-KATIA) 1500 (600 Ca) MG tablet Take 600 mg by mouth daily        cannabidiol (EPIDIOLEX) 100 MG/ML oral solution Take 30 mg by mouth       clindamycin (CLINDAMAX) 1 % external gel APPLY TO FACE TWICE A DAY FOR ACNE.       levothyroxine (SYNTHROID/LEVOTHROID) 50 MCG tablet Take 50 mcg by mouth       multivitamin w/minerals (THERA-VIT-M) tablet Take 1 tablet by mouth 2 times daily        naltrexone (DEPADE/REVIA) 50 MG tablet Low Dose Naltrexone compounded to 4.5mg. Take 1 capsule daily by mouth at bedtime.       Omega-3 Fatty Acids (OMEGA-3 EPA FISH OIL PO) Take 1,640 mg by mouth daily        Resveratrol 250 MG CAPS Take 1 capsule by mouth       saccharomyces boulardii (FLORASTOR) 250 MG capsule Take 1 capsule by mouth       spironolactone (ALDACTONE) 50 MG tablet Take 50 mg by mouth       Vedolizumab (ENTYVIO IV) Inject 300 mg into the vein every 28 days        [DISCONTINUED] Collagen Hydrolysate POWD Take 1 Dose by mouth       [DISCONTINUED] predniSONE (DELTASONE) 10 MG tablet 20 mg        [DISCONTINUED] AzaTHIOprine (IMURAN PO)        [DISCONTINUED] Mesalamine (LIALDA PO)        [DISCONTINUED] oxyCODONE IR (ROXICODONE) 5 MG tablet Take 1 tablet (5 mg) by mouth every 6 hours as needed for pain 20 tablet 0     No facility-administered encounter medications on file as of 10/21/2020.       NSAID  none    Review of Systems  Complete 10 System ROS performed. All are negative except as documented below, in the HPI, or in patient questionnaire from today's visit.    1) Constitutional: No  "fevers, chills, night sweats or malaise, weight loss or gain  2) Skin: No rash  3) Pulmonary: No wheeze, SOB, cough, sputum or hemoptysis  4) Cardiovascular: No Chest pain or palpitations  5) Genitourinary: No blood in urine or dysuria  6) Endocrine: No increased sweating, hunger, thirst or thyroid problems  7) Hematologic: No bruising and easy bleeding  8) Musculoskeletal: no new pain in joints or limitation in ROM  9) Neurologic: No dizziness, paresthesias or weakness or falls  10) Psychiatric:  not depressed/anxious, no sleep problems    PHYSICAL EXAM  Vitals: Ht 1.702 m (5' 7\")   Wt 59 kg (130 lb)   BMI 20.36 kg/m    No Pain (0)       General appearance  Healthy appearing adult, in no acute distress     Eyes  Sclera anicteric  Pupils round and reactive to light     Ears, nose, mouth and throat  No obvious external lesions of ears and nose  Hearing intact     Neck  Symmetric  No obvious external lesions     Respiratory  Normal respiration, no use of accessory muscles      MSK  Gait normal     Skin  No rashes or jaundice      Psychiatric  Oriented to person, place and time  Appropriate mood and affect.     DATA:  Reviewed in detail past documentation, medications and prior workup available in electronic health records or through outside records.    PERTINENT STUDIES:  C diff neg 8/2020   Fecal calprotectin 6/2020 74   Labs 6/2020 normal.       Most recent CBC:  WBC   Date Value Ref Range Status   01/03/2018 11.0 4.0 - 11.0 10e9/L Final   ]  Hemoglobin   Date Value Ref Range Status   01/03/2018 12.1 11.7 - 15.7 g/dL Final   ]   Platelet Count   Date Value Ref Range Status   01/03/2018 384 150 - 450 10e9/L Final     Most recent hepatic panel:  AST   Date Value Ref Range Status   01/03/2018 11 0 - 45 U/L Final     ALT   Date Value Ref Range Status   01/03/2018 14 0 - 50 U/L Final     No results found for: BILICONJ   Bilirubin Total   Date Value Ref Range Status   01/03/2018 0.3 0.2 - 1.3 mg/dL Final     Albumin "   Date Value Ref Range Status   01/03/2018 2.7 (L) 3.4 - 5.0 g/dL Final     Alkaline Phosphatase   Date Value Ref Range Status   01/03/2018 46 40 - 150 U/L Final       Most recent creatinine:  Creatinine   Date Value Ref Range Status   01/03/2018 0.73 0.52 - 1.04 mg/dL Final       Endoscopy:   Flex sig 9/2020 showed Tolliver 3 colitis from rectum to descending colon    icscope 9/2019:  Impression:   - The examined portion of the ileum                        was normal.                       - One 10 to 11 mm polyp in the                        sigmoid colon, removed with a hot                        snare. Resected and retrieved.                       - Pancolitis. Inflammation was found                        from the anus to the cecum. This was                        mild in severity and graded as Tolliver                        Score 1 (mild disease). Biopsied.      icscope 8/2017 showed E3 colitis (severity unclear): Congested, erythematous and eroded                        mucosa in the entire examined colon.                        Biopsied from a) right colon and b)                        left colon. Findings consistent with                        ulcerative pan colitis.    Specimens:      A) - Colon, , Right colon bx's                                                                     B) - Colon, , Left colon bx's                                                                      C) - Colon, sigmoid                                                                        FINAL DIAGNOSIS A.  Colon, right, biopsy -- Colonic mucosal fragments with minimal crypt architectural distortion and minimal, focal activity    B.  Colon, left, biopsy -- Colonic mucosal fragments with minimal crypt architectural distortion and minimal, focal activity    C.  Colon, sigmoid, polypectomy -- Inflammatory polyp       IMPRESSION:    Ms. 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 She is  planning to participate in the FMT for UC trial here, but I am concerned that she is doing too unwell at this point to commit to 6 weeks of her current therapy, especially since she is on prednisone. I suspect we will need to switch biologics (likely to Stelara, since she was a primary nonresponder to Humira). We will proceed with the following:    DIAGNOSIS:  # Pan UC, Tolliver 3 on Entyvio q4 w, steroid-dependant and flaring     PLAN:  ---Blood work   ---Fecal calprotectin    ---Plan will be to continue prednisone taper with the goal of being off prior to FMT trial  ---Watch out for adrenal insufficiency. If she experiences significant fatigue, will check a cortisol level and refer to endocrinology   ---Referrals Rani Casillas ([phone]) and Nina Garcia, PharmD for healthcare maintenance     Misc:  -- Avoid tobacco use  -- Avoid NSAIDs as there is potentially a 25% chance of causing an IBD flare    Return in about 6 weeks (around 12/2/2020).    Poonam Dunbar MD   of Medicine  Division of Gastroenterology, Hepatology and Nutrition  [facility]    Video-Visit Details  Type of service:  Video Visit  Video Start Time: 3:50 PM  Video End Time: 4:56 PM  Originating Location (pt. Location): Home  Distant Location (provider location):  [facility] [place]   Platform used for Video Visit: [employer]unj    Again, thank you for allowing me to participate in the care of your patient.      Sincerely,      Poonam Dunbar MD

## 2020-10-26 ENCOUNTER — PATIENT OUTREACH (OUTPATIENT)
Dept: GASTROENTEROLOGY | Facility: CLINIC | Age: 25
End: 2020-10-26

## 2020-10-26 DIAGNOSIS — K51.90 ULCERATIVE COLITIS (H): Primary | ICD-10-CM

## 2020-10-26 RX ORDER — PREDNISONE 5 MG/1
5 TABLET ORAL DAILY
Qty: 75 TABLET | Refills: 0 | Status: SHIPPED | OUTPATIENT
Start: 2020-10-26 | End: 2020-11-16

## 2020-10-28 ENCOUNTER — VIRTUAL VISIT (OUTPATIENT)
Dept: PHARMACY | Facility: CLINIC | Age: 25
End: 2020-10-28
Payer: COMMERCIAL

## 2020-10-28 ENCOUNTER — PATIENT OUTREACH (OUTPATIENT)
Dept: GASTROENTEROLOGY | Facility: CLINIC | Age: 25
End: 2020-10-28

## 2020-10-28 DIAGNOSIS — K51.90 ULCERATIVE COLITIS (H): ICD-10-CM

## 2020-10-28 DIAGNOSIS — K51.911 ULCERATIVE COLITIS WITH RECTAL BLEEDING, UNSPECIFIED LOCATION (H): Primary | ICD-10-CM

## 2020-10-28 PROCEDURE — 99207 PR NO CHARGE LOS: CPT | Performed by: PHARMACIST

## 2020-10-28 NOTE — PROGRESS NOTES
therapy plan placed for the patient continued Entyvio infusion. patient received last infusion on 10/26.  Called [facility] and requested that the patient benefits be run to see if she qualifies for home infusion.      FCP testing ordered and will be sent to the patients home.

## 2020-10-28 NOTE — PROGRESS NOTES
Clinical Pharmacy Consult:                                                    Xochitl Caputo is a 25 year old female contacted via secure video for a clinical pharmacist consult.  She was referred to me from Dr. Dunbar.     Xochitl is not seen for CMR today due to coverage.    Reason for Consult:  review Entyvio and herbs    Discussion:     She was recently seen by Dr. Dunbar on 10/21 to establish care. Due for infusion in four weeks - last infusion was on Monday 10/26. She is new to clinic and we are taking over the management of Entyvio. We reviewed today that the infusion plan is entered and the prior authorization is currently under review per referral notes. Xochitl mentions that she needs to get some labs done, but I do not see these pended in her chart. I offered to connect with Alexandra BALLARDCC about this, which she would appreciate.    She asks about teas she is using and is wondering if these interact with any of her medications. She does not consume these daily or in excessive amounts. Primarily lemon balm/licorice/dandelion tea. We reviewed her medications and supplements today as described below.    IBD Health Care Maintenance:    Vaccinations:  All patients on biologics should avoid live vaccines.    -- Influenza (every year) 9/28/20  -- TdaP (every 10 years) last 12/29/2016, due 12/2026  -- Pneumococcal Pneumonia (once plus booster at 5 years)   - Prevnar-13 5/6/2019   - Pneumovax-23 not on file  -- Yearly assessment for latent Tb (verbal screening and exam, PPD or QuantiFERON-Tb testing)   - not on file (per CareEverywhere this may have been ordered 2017, but I do not see it was resulted)    One time confirmation of immunity or serologies:  -- Hepatitis A (serologies or immunizations) vaccinated 8/2/2010 and 6/17/2011  -- Hepatitis B (serologies or immunizations) vaccinated 9/1994, 11/994, and 6/1995  -- Varicella vaccinated 9/1995 and 11/2008  -- MMR second dose 5/22/2000  -- HPV (all aged 18-26)   vaccination complete 6/2011  -- Meningococcal meningitis (all patients at risk for meningitis)-- second dose 8/2015    Due to the immunosuppression in this patient, I would not advise administration of live vaccines such as varicella/VZV, intranasal influenza, MMR, or yellow fever vaccine (if traveling).      Bone mineral density screening   -- Recommend all patients supplement with calcium and vitamin D  -- Given prior steroid use recommend DEXA if not already done   - no prior DEXA per her report   - prednisone now and back in June    Cancer Screening:  Cervical cancer screening: Annual due to immunosupression    Skin cancer screening: Annual visual exam of skin by dermatologist since patient is immunocompromised    Depression Screening:  -- Over the last month, have you felt down, depressed, or hopeless? No  -- Over the last month, have you felt little interest or pleasure doing things? No    Misc:  -- Avoid tobacco use  -- Avoid NSAIDs as there is potentially a 25% chance of causing an IBD flare    Patient reported medication  Prednisone 15 mg daily  Entyvio every 4 weeks  Levothyroxine 50 mcg daily  Spironolactone 50 mg daily   P-naltrexone 4.5 mg daily     Supplements  - fish oil EPA Langley naturals 2 daily (1,640 mg omega-3)  - turmeric 6 capsules (per 2 capsules 740 mg turmeric)  - multivitamin twice daily - designs for health    -vitamin D 25 mcg    - B-12 500 mcg   - folate 680 mcg   -calcium 2 capsules daily (650 mg calcium/2)   - vitamin D 3 125 mcg every other day  - reservatrol from [facility]  - probiotic saccharomyces boulardii - hoping to get visbiome   - iron for flares (ferrous sulfate 325 mg daily)   - CBD for anxiety  -metagenix powder ultra inflam x+360 2 scoops daily (250 mg curcumin / 2 scoops) powder      Plan:  1. Nina to review CBD/prednisone as well as herbal tea  During our visit, I ran CBD against prednisone in [employer] which did not yield any major drug-drug  interactions. I also checked this in [employer] with the same result. Per [facility], in theory cannabidiol may have a decreased effect in the presence of prednisone due to induction of FUR0P06.     Per [employer] [facility], Lemon balm can bind to TSH affecting levothyroxine efficacy. Licorice root can interfere with both spironolactone and prednisone (increase prednisone levels and increase BP - - but she is not using spironolactone for this reason). Lastly, dandelion may increase potassium, which could be a concern with spironolactone though she is using this on an infrequent basis. Last potassium 4 mmol/L per CareEverywhere.    2. Nina to connect with Alexandra re: Lab   Connected with Alexandra immediately after our visit who reached out to Xochitl directly.    3. Xochitl to consider the following vaccine:  - pneumovax-23     Future Considerations  - DEXA scan for bones  - Quantiferon tuberculosis screening

## 2020-10-29 ENCOUNTER — TELEPHONE (OUTPATIENT)
Dept: PHARMACY | Facility: CLINIC | Age: 25
End: 2020-10-29

## 2020-10-29 ENCOUNTER — PATIENT OUTREACH (OUTPATIENT)
Dept: GASTROENTEROLOGY | Facility: CLINIC | Age: 25
End: 2020-10-29

## 2020-10-29 RX ORDER — FERROUS SULFATE 324(65)MG
325 TABLET, DELAYED RELEASE (ENTERIC COATED) ORAL DAILY PRN
COMMUNITY
End: 2021-12-17

## 2020-10-29 NOTE — TELEPHONE ENCOUNTER
PA Initiation    Medication: Entyvio 300mg every 4 weeks  Insurance Company: [employer] ([facility]) - Phone [phone] Fax [phone]  Pharmacy Filling the Rx: [facility]  Filling Pharmacy Phone:    Filling Pharmacy Fax:    Start Date: 10/29/2020    Central Prior Authorization Team   Phone: [phone]          Carl olmos patient's PBM is express Scripts submitted via CMM:

## 2020-10-29 NOTE — TELEPHONE ENCOUNTER
[facility] PRIOR AUTHORIZATION REQUEST     Drug including DOSE: Entyvio 300mg q4wks  J Code: , , 05207 and 68430  NDC: [address]-32  ICD 10 code: K51.90    Date(s) of Service: 11/1/2020    Insurance Name: [facility] (auth goes to [employer] for medical)  Insurance ID: [id number]    Provider: Poonam Dunbar  Provider NPI: [id number]      [facility]  NPI: [id number]

## 2020-10-29 NOTE — PATIENT INSTRUCTIONS
It was nice speaking to you today.    1) I will follow-up on the interaction review for CBD/prednisone and your herbal teas/current medications.    2) I will connect with Alexandra regarding your upcoming labs and she will likely reach out to you directly.    3) Consider the following vaccine:   - Pneumovax-23: this is a non-live pneumonia vaccine which is indicated for those on chronic immunosuppression. It also gets a booster dose 5 years after the first dose. Since it has been > 8 weeks since your Prevnar-13 you can get this at any time.    Please let me know if you have further questions. We will plan on reviewing your health maintenance again in one year.    Nina Escalona PharmD, BCACP  MTM Pharmacist   [facility]  Phone: [phone]

## 2020-10-29 NOTE — PROGRESS NOTES
patient called in with symptoms.  Patient  had increased abdominal pain last night with nausea and vomited once. She was able to sleep and this am she still has abdominal  pian but was able to eat and keep breakfast down.  Patient would like to discuss next steps with Dr Dunbar. Will talk with Dr Dunbar and call the paitent

## 2020-10-31 ENCOUNTER — MYC MEDICAL ADVICE (OUTPATIENT)
Dept: GASTROENTEROLOGY | Facility: CLINIC | Age: 25
End: 2020-10-31

## 2020-10-31 DIAGNOSIS — K51.90 ULCERATIVE COLITIS (H): Primary | ICD-10-CM

## 2020-11-02 ENCOUNTER — TELEPHONE (OUTPATIENT)
Dept: GASTROENTEROLOGY | Facility: CLINIC | Age: 25
End: 2020-11-02

## 2020-11-02 ENCOUNTER — HOME INFUSION (PRE-WILLOW HOME INFUSION) (OUTPATIENT)
Dept: PHARMACY | Facility: CLINIC | Age: 25
End: 2020-11-02

## 2020-11-02 RX ORDER — OMEPRAZOLE 40 MG/1
40 CAPSULE, DELAYED RELEASE ORAL DAILY
Qty: 90 CAPSULE | Refills: 3 | Status: SHIPPED | OUTPATIENT
Start: 2020-11-02 | End: 2021-12-17

## 2020-11-02 NOTE — TELEPHONE ENCOUNTER
----- Message from Livier Velasquez [facility] sent at 11/2/2020  9:50 AM CST -----  Regarding: Entyvio Frequency  Archana,     Patient Xochitl Caputo has her dosing interval for Entyvio written as week 6 then every 8 weeks, it is also listed as every 28 days for the dosing interval. Can you please clarify what her dosing interval should be?    Thank you,   Livier Velasquez, PharmD   [phone]: Direct Line

## 2020-11-02 NOTE — TELEPHONE ENCOUNTER
Xochitl called in today with questions regarding her next steps. And symptoms she was having.  She is having  GI reflux symptoms due to her prednisone. She states that she is feeling like there is mucous stuck in her throat, and it never goes away. Patient was sent a prescription for omeprazole.    patient has an appt with Dr Dunbar on Thursday to discuss her next steps in medication management of her UC.

## 2020-11-02 NOTE — TELEPHONE ENCOUNTER
Prior Authorization Approval    Authorization Effective Date: 9/29/2020  Authorization Expiration Date: 10/29/2021  Medication: Entyvio 300mg every 4 weeks  Approved Dose/Quantity: 300mg  Reference #: [id number]   Insurance Company: [employer] ([facility]) - Phone [phone] Fax [phone]  Which Pharmacy is filling the prescription (Not needed for infusion/clinic administered): [facility]  Pharmacy Notified: Yes

## 2020-11-03 NOTE — PROGRESS NOTES
This is a recent snapshot of the patient's [facility] medical record.  For current drug dose and complete information and questions, call [phone] or In Basket pool, [facility] home infusion ([id number])  CSN Number:  [id number]

## 2020-11-04 ENCOUNTER — HOME INFUSION (PRE-WILLOW HOME INFUSION) (OUTPATIENT)
Dept: PHARMACY | Facility: CLINIC | Age: 25
End: 2020-11-04

## 2020-11-05 ENCOUNTER — VIRTUAL VISIT (OUTPATIENT)
Dept: GASTROENTEROLOGY | Facility: CLINIC | Age: 25
End: 2020-11-05
Payer: COMMERCIAL

## 2020-11-05 VITALS — BODY MASS INDEX: 20.72 KG/M2 | HEIGHT: 67 IN | WEIGHT: 132 LBS

## 2020-11-05 DIAGNOSIS — K51.311 ULCERATIVE RECTOSIGMOIDITIS WITH RECTAL BLEEDING (H): ICD-10-CM

## 2020-11-05 DIAGNOSIS — D84.9 IMMUNOSUPPRESSION (H): Primary | ICD-10-CM

## 2020-11-05 PROCEDURE — 99215 OFFICE O/P EST HI 40 MIN: CPT | Mod: 95 | Performed by: INTERNAL MEDICINE

## 2020-11-05 ASSESSMENT — MIFFLIN-ST. JEOR: SCORE: 1371.38

## 2020-11-05 ASSESSMENT — PAIN SCALES - GENERAL: PAINLEVEL: NO PAIN (0)

## 2020-11-05 NOTE — NURSING NOTE
"Chief Complaint   Patient presents with     RECHECK     follow up       Vitals:    11/05/20 1257   Weight: 59.9 kg (132 lb)   Height: 1.702 m (5' 7\")       Body mass index is 20.67 kg/m .                          Waleska Champion CMA    "

## 2020-11-05 NOTE — LETTER
"    11/5/2020         RE: Xochitl Caputo  [address]  [place] [address]        Dear Colleague,    Thank you for referring your patient, Xochitl Caputo, to the [facility] [place]. Please see a copy of my visit note below.    Xochitl Caputo is a 25 year old female who is being evaluated via a billable video visit.      The patient has been notified of following:     \"This video visit will be conducted via a call between you and your physician/provider. We have found that certain health care needs can be provided without the need for an in-person physical exam.  This service lets us provide the care you need with a video conversation.  If a prescription is necessary we can send it directly to your pharmacy.  If lab work is needed we can place an order for that and you can then stop by our lab to have the test done at a later time.    Video visits are billed at different rates depending on your insurance coverage.  Please reach out to your insurance provider with any questions.    If during the course of the call the physician/provider feels a video visit is not appropriate, you will not be charged for this service.\"    Patient has given verbal consent for Video visit? Yes  How would you like to obtain your AVS? MyChart  If you are dropped from the video visit, the video invite should be resent to: Send to e-mail at: [email or web address]  Will anyone else be joining your video visit? No        [facility] UC follow up       PATIENT: Xochitl Caputo    MRN: [id number]    Date of Birth 1995    Tel: [phone] (home) NONE (work)    PCP: Jillian Tejada MD     HPI: Ms. Caputo is a 25 year old female here to establish care for UC.     UC history  Was sick before she was diagnosed in 2017. She was seen at [facility] prior to this and was told she had IBS and dehydration.  Hgb was 5.9. Switched care to [facility]. Tried phyllis Smith, " "Humira. Transitioned to Entyvio     and has been in symptomatic remission from 11/2018-6/2020. Around June, developed a flare in the setting of psychological stress (pandemic, riots, etc). Lost 22 lb, was on 2 courses prednisone (9/17-present). Currently on 20 mg daily.  Had 3 Entyvio infusions on q4w dosing at this point. Had a flex sig 9/16/2020 (below).     Screening appointment for FMT study here at the .   8/2018 established care with [facility] medicine (integrative medicine) which helped. Switched to [facility] about a year ago.     Meds: Entyvio q4w, low dose naltrexone, levothyroxine, spirinolactone (acne)  Supplements: inflamax (powder), fish oil, reversatrol extract, tumeric with black pepper (740 mg), S. Boulardi, VSL #3     Gets acupuncture and recently started LED light therapy (a belt of LED lights)    Macroscopic extent of disease (most recent) E3    Current UC symptoms    Bowel frequency in day 3-4   Bowel frequency in night none  Urgency of defecation YES occasionally, mild   Blood in stool only with wiping   General well being 2 = poor  Extracolonic features (multiple select) none     Constitutional symptoms:  Fever NO  Weight loss YES 22 lb since June     Noteworthy diet history- GFD, very low dairy, avoids eggs, avoid processed foods/sugars. Avoids raw veggies when flaring.     Total number of IBD surgeries (except perianal): 0    Current IBD Medications:  Entyvio q4w  Prednisone 20 mg daily     Past IBD Medications:   Imuran - got PNA, \"didn't feel like myself\"   Humira -- primary nonresponder   Sarah    Interval history, 11/2020 (video visit)  Has been on prednisone 25 mg daily but can't taper off this further (loose, frequent stools and more blood).   Reports no response to Humira in the past; was on this for 2 months or so. Levels were not checked and did not escalate to EW dosing.     Past Medical History:   Diagnosis Date     Acne      Hypothyroidism      Ulcerative " colitis (H)      Ulcerative colitis (H) 10/28/2020        Past Surgical History:   Procedure Laterality Date     EYE SURGERY       ORTHOPEDIC SURGERY      right ankle.       Social History     Tobacco Use     Smoking status: Never Smoker     Smokeless tobacco: Never Used   Substance Use Topics     Alcohol use: Not on file       Family History   Problem Relation Age of Onset     Colitis Maternal Grandfather      Ulcerative Colitis Maternal Grandfather      Crohn's Disease No family hx of      GERD No family hx of      Stomach Cancer No family hx of        No Known Allergies     Outpatient Encounter Medications as of 11/5/2020   Medication Sig Dispense Refill     Black Pepper-Turmeric 5-1000 MG CAPS Take 2,000 mg by mouth daily        calcium carbonate (OS-KATIA) 1500 (600 Ca) MG tablet Take 600 mg by mouth daily        cannabidiol (EPIDIOLEX) 100 MG/ML oral solution Take 30 mg by mouth       clindamycin (CLINDAMAX) 1 % external gel APPLY TO FACE TWICE A DAY FOR ACNE.       Ferrous Sulfate 324 (65 Fe) MG TBEC Take 325 mg by mouth daily as needed (if flaring)       HERBALS Metagenix powder ultra inflam x+360 2 scoops daily       levothyroxine (SYNTHROID/LEVOTHROID) 50 MCG tablet Take 50 mcg by mouth       multivitamin w/minerals (THERA-VIT-M) tablet Take 1 tablet by mouth 2 times daily        naltrexone (DEPADE/REVIA) 50 MG tablet Low Dose Naltrexone compounded to 4.5mg. Take 1 capsule daily by mouth at bedtime.       Omega-3 Fatty Acids (OMEGA-3 EPA FISH OIL PO) Take 1,640 mg by mouth daily        omeprazole (PRILOSEC) 40 MG DR capsule Take 1 capsule (40 mg) by mouth daily 90 capsule 3     predniSONE (DELTASONE) 5 MG tablet Take 1 tablet (5 mg) by mouth daily 20 mg daily for 1 week then decrease by one tablet weekly until at 5 mg. Then take 5 every other day for one week then stop. 75 tablet 0     Resveratrol 250 MG CAPS Take 1 capsule by mouth       saccharomyces boulardii (FLORASTOR) 250 MG capsule Take 1 capsule by  "mouth       spironolactone (ALDACTONE) 50 MG tablet Take 50 mg by mouth       Vedolizumab (ENTYVIO IV) Inject 300 mg into the vein every 28 days        Vitamin D3 (CHOLECALCIFEROL) 125 MCG (5000 UT) tablet Take 1 tablet by mouth every other day       No facility-administered encounter medications on file as of 11/5/2020.       NSAID  none    Review of Systems  Complete 10 System ROS performed. All are negative except as documented below, in the HPI, or in patient questionnaire from today's visit.    1) Constitutional: No fevers, chills, night sweats or malaise, weight loss or gain  2) Skin: No rash  3) Pulmonary: No wheeze, SOB, cough, sputum or hemoptysis  4) Cardiovascular: No Chest pain or palpitations  5) Genitourinary: No blood in urine or dysuria  6) Endocrine: No increased sweating, hunger, thirst or thyroid problems  7) Hematologic: No bruising and easy bleeding  8) Musculoskeletal: no new pain in joints or limitation in ROM  9) Neurologic: No dizziness, paresthesias or weakness or falls  10) Psychiatric:  not depressed/anxious, no sleep problems    PHYSICAL EXAM  Vitals: Ht 1.702 m (5' 7\")   Wt 59.9 kg (132 lb)   BMI 20.67 kg/m      No Pain (0)       General appearance  Healthy appearing adult, in no acute distress     Eyes  Sclera anicteric  Pupils round and reactive to light     Ears, nose, mouth and throat  No obvious external lesions of ears and nose  Hearing intact     Neck  Symmetric  No obvious external lesions     Respiratory  Normal respiration, no use of accessory muscles      MSK  Gait normal     Skin  No rashes or jaundice      Psychiatric  Oriented to person, place and time  Appropriate mood and affect.     DATA:  Reviewed in detail past documentation, medications and prior workup available in electronic health records or through outside records.    PERTINENT STUDIES:  C diff neg 8/2020   Fecal calprotectin 6/2020 74   Labs 6/2020 normal.       Most recent CBC:  WBC   Date Value Ref Range " Status   01/03/2018 11.0 4.0 - 11.0 10e9/L Final   ]  Hemoglobin   Date Value Ref Range Status   01/03/2018 12.1 11.7 - 15.7 g/dL Final   ]   Platelet Count   Date Value Ref Range Status   01/03/2018 384 150 - 450 10e9/L Final     Most recent hepatic panel:  AST   Date Value Ref Range Status   01/03/2018 11 0 - 45 U/L Final     ALT   Date Value Ref Range Status   01/03/2018 14 0 - 50 U/L Final     No results found for: BILICONJ   Bilirubin Total   Date Value Ref Range Status   01/03/2018 0.3 0.2 - 1.3 mg/dL Final     Albumin   Date Value Ref Range Status   01/03/2018 2.7 (L) 3.4 - 5.0 g/dL Final     Alkaline Phosphatase   Date Value Ref Range Status   01/03/2018 46 40 - 150 U/L Final       Most recent creatinine:  Creatinine   Date Value Ref Range Status   01/03/2018 0.73 0.52 - 1.04 mg/dL Final       Endoscopy:     Flex sig 9/2020 showed Tolliver 3 colitis from rectum to descending colon    icscope 9/2019:  Impression:   - The examined portion of the ileum                        was normal.                       - One 10 to 11 mm polyp in the                        sigmoid colon, removed with a hot                        snare. Resected and retrieved.                       - Pancolitis. Inflammation was found                        from the anus to the cecum. This was                        mild in severity and graded as Tolliver                        Score 1 (mild disease). Biopsied.      icscope 8/2017 showed E3 colitis (severity unclear): Congested, erythematous and eroded                        mucosa in the entire examined colon.                        Biopsied from a) right colon and b)                        left colon. Findings consistent with                        ulcerative pan colitis.    Specimens:   A) - Colon, , Right colon bx's                                                                      B) - Colon, , Left colon bx's                                                                       C) - Colon,  sigmoid                                                                        FINAL DIAGNOSIS A.  Colon, right, biopsy -- Colonic mucosal fragments with minimal crypt architectural distortion and minimal, focal activity    B.  Colon, left, biopsy -- Colonic mucosal fragments with minimal crypt architectural distortion and minimal, focal activity    C.  Colon, sigmoid, polypectomy -- Inflammatory polyp       IMPRESSION:    Ms. 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 She had been planning to participate in the FMT for UC trial here, her disease is too active to wait until this trial begins, especially since she steroid-dependent currently. Today we discussed various biologic options and decided to move forward with Stelara, especially because she was a primary nonresponder to Humira.     We discussed the risks, benefits and alternatives to Stelara (ustekinumab) for Crohn's disease. Risks discussed include risk of infections and reactivation of latent infections. Serious infections (bacterial, fungal and viral) were observed in clinical trials, but not at increased rates over placebo. There is a theoretical risk for mycobacterial infections and salmonella. Pre-treatment evaluation for TB will be done to minimize risk. Malignancies were reported in some patients in clinical trials, but not at increased frequency compared to placebo. Hypersensitivity reactions, including anaphylaxis, are reported but are rare. One case of reversible posterior leukoencephalopathy syndrome (RPLS) was observed in clinical studies of psoriasis and psoriatic arthritis, however no cases have been observed in Crohn's disease. Patients to be treated with Stelara should have all age-appropriate immunizations, but should not be given BCG during treatment or one year following discontinuation of Stelara. There is slight increased risk for nasopharyngitis, injection site reactions, and  vuvlovaginal candidiasis, UTI, sinusitis, bronchitis. Listeria meningitis and ophthalmic herpes were reported in 1 patient each. Approximately 3% of patients develop antibodies to Stelara. There is limited information to guide use of Stelara during pregnancy. No adverse developmental effects were observed in monkeys exposed to doses 100 time greater than use. The patient was instructed to call immediately for signs or symptoms of infection, such as fever, sweats, chills, muscle aches, cough, shortness of breath, blood in phlegm, weight loss, warm, red or painful skin or sores, burning with urination or frequent urination, severe fatigue. Patient was also advised to call if they know they have TB or have been in close contact with someone with TB. Patient was also instructed to call immediately for headaches, seizures, confusion or vision problems. Dosing is given as intravenous load of 260 mg for patients up to 55 kg, 390 mg for patients >55 kg to 85 kg, and 520 mg for patients greater than 85 kg.     Also, she reports heartburn while on steroid therapy, so PPI was initiated.  She has been taking this with every meal for the past 2 days due to a misunderstanding of the instructions.     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    Poonam Dunbar MD   of Medicine  Division of Gastroenterology, Hepatology and Nutrition  [facility]    Video-Visit Details    Type of service:  Video Visit    Video Start Time: 1:18 PM  Video End Time: 2:00 PM    Originating  Location (pt. Location): Home    Distant Location (provider location):  [facility] [place]     Platform used for Video Visit: Nikunj Dunbar MD

## 2020-11-05 NOTE — PROGRESS NOTES
This is a recent snapshot of the patient's [facility] medical record.  For current drug dose and complete information and questions, call [phone] or In Basket pool, [facility] home infusion ([id number])  CSN Number:  [id number]

## 2020-11-05 NOTE — PROGRESS NOTES
"Xochitl Caputo is a 25 year old female who is being evaluated via a billable video visit.      The patient has been notified of following:     \"This video visit will be conducted via a call between you and your physician/provider. We have found that certain health care needs can be provided without the need for an in-person physical exam.  This service lets us provide the care you need with a video conversation.  If a prescription is necessary we can send it directly to your pharmacy.  If lab work is needed we can place an order for that and you can then stop by our lab to have the test done at a later time.    Video visits are billed at different rates depending on your insurance coverage.  Please reach out to your insurance provider with any questions.    If during the course of the call the physician/provider feels a video visit is not appropriate, you will not be charged for this service.\"    Patient has given verbal consent for Video visit? Yes  How would you like to obtain your AVS? MyChart  If you are dropped from the video visit, the video invite should be resent to: Send to e-mail at: [email or web address]  Will anyone else be joining your video visit? No        [facility] UC follow up       PATIENT: Xochitl Caputo    MRN: [id number]    Date of Birth 1995    Tel: [phone] (home) NONE (work)    PCP: Jillian Tejada MD     HPI: Ms. Caputo is a 25 year old female here to establish care for UC.     UC history  Was sick before she was diagnosed in 2017. She was seen at [facility] prior to this and was told she had IBS and dehydration.  Hgb was 5.9. Switched care to [facility]. Tried Lialda, imuran, Humira. Transitioned to Entyvio     and has been in symptomatic remission from 11/2018-6/2020. Around June, developed a flare in the setting of psychological stress (pandemic, riots, etc). Lost 22 lb, was on 2 courses prednisone (9/17-present). Currently on 20 mg daily.  Had 3 Entyvio " "infusions on q4w dosing at this point. Had a flex sig 9/16/2020 (below).     Screening appointment for FMT study here at the .   8/2018 established care with [facility] medicine (integrative medicine) which helped. Switched to [facility] about a year ago.     Meds: Entyvio q4w, low dose naltrexone, levothyroxine, spirinolactone (acne)  Supplements: inflamax (powder), fish oil, reversatrol extract, tumeric with black pepper (740 mg), HENRY Fontenotmargot, VSL #3     Gets acupuncture and recently started LED light therapy (a belt of LED lights)    Macroscopic extent of disease (most recent) E3    Current UC symptoms    Bowel frequency in day 3-4   Bowel frequency in night none  Urgency of defecation YES occasionally, mild   Blood in stool only with wiping   General well being 2 = poor  Extracolonic features (multiple select) none     Constitutional symptoms:  Fever NO  Weight loss YES 22 lb since June     Noteworthy diet history- GFD, very low dairy, avoids eggs, avoid processed foods/sugars. Avoids raw veggies when flaring.     Total number of IBD surgeries (except perianal): 0    Current IBD Medications:  Entyvio q4w  Prednisone 20 mg daily     Past IBD Medications:   Imuran - got PNA, \"didn't feel like myself\"   Humira -- primary nonresponder   Sarah    Interval history, 11/2020 (video visit)  Has been on prednisone 25 mg daily but can't taper off this further (loose, frequent stools and more blood).   Reports no response to Humira in the past; was on this for 2 months or so. Levels were not checked and did not escalate to EW dosing.     Past Medical History:   Diagnosis Date     Acne      Hypothyroidism      Ulcerative colitis (H)      Ulcerative colitis (H) 10/28/2020        Past Surgical History:   Procedure Laterality Date     EYE SURGERY       ORTHOPEDIC SURGERY      right ankle.       Social History     Tobacco Use     Smoking status: Never Smoker     Smokeless tobacco: Never Used   Substance Use " Topics     Alcohol use: Not on file       Family History   Problem Relation Age of Onset     Colitis Maternal Grandfather      Ulcerative Colitis Maternal Grandfather      Crohn's Disease No family hx of      GERD No family hx of      Stomach Cancer No family hx of        No Known Allergies     Outpatient Encounter Medications as of 11/5/2020   Medication Sig Dispense Refill     Black Pepper-Turmeric 5-1000 MG CAPS Take 2,000 mg by mouth daily        calcium carbonate (OS-KATIA) 1500 (600 Ca) MG tablet Take 600 mg by mouth daily        cannabidiol (EPIDIOLEX) 100 MG/ML oral solution Take 30 mg by mouth       clindamycin (CLINDAMAX) 1 % external gel APPLY TO FACE TWICE A DAY FOR ACNE.       Ferrous Sulfate 324 (65 Fe) MG TBEC Take 325 mg by mouth daily as needed (if flaring)       HERBALS Metagenix powder ultra inflam x+360 2 scoops daily       levothyroxine (SYNTHROID/LEVOTHROID) 50 MCG tablet Take 50 mcg by mouth       multivitamin w/minerals (THERA-VIT-M) tablet Take 1 tablet by mouth 2 times daily        naltrexone (DEPADE/REVIA) 50 MG tablet Low Dose Naltrexone compounded to 4.5mg. Take 1 capsule daily by mouth at bedtime.       Omega-3 Fatty Acids (OMEGA-3 EPA FISH OIL PO) Take 1,640 mg by mouth daily        omeprazole (PRILOSEC) 40 MG DR capsule Take 1 capsule (40 mg) by mouth daily 90 capsule 3     predniSONE (DELTASONE) 5 MG tablet Take 1 tablet (5 mg) by mouth daily 20 mg daily for 1 week then decrease by one tablet weekly until at 5 mg. Then take 5 every other day for one week then stop. 75 tablet 0     Resveratrol 250 MG CAPS Take 1 capsule by mouth       saccharomyces boulardii (FLORASTOR) 250 MG capsule Take 1 capsule by mouth       spironolactone (ALDACTONE) 50 MG tablet Take 50 mg by mouth       Vedolizumab (ENTYVIO IV) Inject 300 mg into the vein every 28 days        Vitamin D3 (CHOLECALCIFEROL) 125 MCG (5000 UT) tablet Take 1 tablet by mouth every other day       No facility-administered encounter  "medications on file as of 11/5/2020.       NSAID  none    Review of Systems  Complete 10 System ROS performed. All are negative except as documented below, in the HPI, or in patient questionnaire from today's visit.    1) Constitutional: No fevers, chills, night sweats or malaise, weight loss or gain  2) Skin: No rash  3) Pulmonary: No wheeze, SOB, cough, sputum or hemoptysis  4) Cardiovascular: No Chest pain or palpitations  5) Genitourinary: No blood in urine or dysuria  6) Endocrine: No increased sweating, hunger, thirst or thyroid problems  7) Hematologic: No bruising and easy bleeding  8) Musculoskeletal: no new pain in joints or limitation in ROM  9) Neurologic: No dizziness, paresthesias or weakness or falls  10) Psychiatric:  not depressed/anxious, no sleep problems    PHYSICAL EXAM  Vitals: Ht 1.702 m (5' 7\")   Wt 59.9 kg (132 lb)   BMI 20.67 kg/m      No Pain (0)       General appearance  Healthy appearing adult, in no acute distress     Eyes  Sclera anicteric  Pupils round and reactive to light     Ears, nose, mouth and throat  No obvious external lesions of ears and nose  Hearing intact     Neck  Symmetric  No obvious external lesions     Respiratory  Normal respiration, no use of accessory muscles      MSK  Gait normal     Skin  No rashes or jaundice      Psychiatric  Oriented to person, place and time  Appropriate mood and affect.     DATA:  Reviewed in detail past documentation, medications and prior workup available in electronic health records or through outside records.    PERTINENT STUDIES:  C diff neg 8/2020   Fecal calprotectin 6/2020 74   Labs 6/2020 normal.       Most recent CBC:  WBC   Date Value Ref Range Status   01/03/2018 11.0 4.0 - 11.0 10e9/L Final   ]  Hemoglobin   Date Value Ref Range Status   01/03/2018 12.1 11.7 - 15.7 g/dL Final   ]   Platelet Count   Date Value Ref Range Status   01/03/2018 384 150 - 450 10e9/L Final     Most recent hepatic panel:  AST   Date Value Ref Range " Status   01/03/2018 11 0 - 45 U/L Final     ALT   Date Value Ref Range Status   01/03/2018 14 0 - 50 U/L Final     No results found for: BILICONJ   Bilirubin Total   Date Value Ref Range Status   01/03/2018 0.3 0.2 - 1.3 mg/dL Final     Albumin   Date Value Ref Range Status   01/03/2018 2.7 (L) 3.4 - 5.0 g/dL Final     Alkaline Phosphatase   Date Value Ref Range Status   01/03/2018 46 40 - 150 U/L Final       Most recent creatinine:  Creatinine   Date Value Ref Range Status   01/03/2018 0.73 0.52 - 1.04 mg/dL Final       Endoscopy:     Flex sig 9/2020 showed Tolliver 3 colitis from rectum to descending colon    icscope 9/2019:  Impression:   - The examined portion of the ileum                        was normal.                       - One 10 to 11 mm polyp in the                        sigmoid colon, removed with a hot                        snare. Resected and retrieved.                       - Pancolitis. Inflammation was found                        from the anus to the cecum. This was                        mild in severity and graded as Tolliver                        Score 1 (mild disease). Biopsied.      icscope 8/2017 showed E3 colitis (severity unclear): Congested, erythematous and eroded                        mucosa in the entire examined colon.                        Biopsied from a) right colon and b)                        left colon. Findings consistent with                        ulcerative pan colitis.    Specimens:   A) - Colon, , Right colon bx's                                                                      B) - Colon, , Left colon bx's                                                                       C) - Colon, sigmoid                                                                        FINAL DIAGNOSIS A.  Colon, right, biopsy -- Colonic mucosal fragments with minimal crypt architectural distortion and minimal, focal activity    B.  Colon, left, biopsy -- Colonic mucosal fragments with  minimal crypt architectural distortion and minimal, focal activity    C.  Colon, sigmoid, polypectomy -- Inflammatory polyp       IMPRESSION:    Ms. 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 She had been planning to participate in the FMT for UC trial here, her disease is too active to wait until this trial begins, especially since she steroid-dependent currently. Today we discussed various biologic options and decided to move forward with Stelara, especially because she was a primary nonresponder to Humira.     We discussed the risks, benefits and alternatives to Stelara (ustekinumab) for Crohn's disease. Risks discussed include risk of infections and reactivation of latent infections. Serious infections (bacterial, fungal and viral) were observed in clinical trials, but not at increased rates over placebo. There is a theoretical risk for mycobacterial infections and salmonella. Pre-treatment evaluation for TB will be done to minimize risk. Malignancies were reported in some patients in clinical trials, but not at increased frequency compared to placebo. Hypersensitivity reactions, including anaphylaxis, are reported but are rare. One case of reversible posterior leukoencephalopathy syndrome (RPLS) was observed in clinical studies of psoriasis and psoriatic arthritis, however no cases have been observed in Crohn's disease. Patients to be treated with Stelara should have all age-appropriate immunizations, but should not be given BCG during treatment or one year following discontinuation of Stelara. There is slight increased risk for nasopharyngitis, injection site reactions, and vuvlovaginal candidiasis, UTI, sinusitis, bronchitis. Listeria meningitis and ophthalmic herpes were reported in 1 patient each. Approximately 3% of patients develop antibodies to Stelara. There is limited information to guide use of Stelara during pregnancy. No adverse developmental  effects were observed in monkeys exposed to doses 100 time greater than use. The patient was instructed to call immediately for signs or symptoms of infection, such as fever, sweats, chills, muscle aches, cough, shortness of breath, blood in phlegm, weight loss, warm, red or painful skin or sores, burning with urination or frequent urination, severe fatigue. Patient was also advised to call if they know they have TB or have been in close contact with someone with TB. Patient was also instructed to call immediately for headaches, seizures, confusion or vision problems. Dosing is given as intravenous load of 260 mg for patients up to 55 kg, 390 mg for patients >55 kg to 85 kg, and 520 mg for patients greater than 85 kg.     Also, she reports heartburn while on steroid therapy, so PPI was initiated.  She has been taking this with every meal for the past 2 days due to a misunderstanding of the instructions.     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    Poonam Dunbar MD   of Medicine  Division of Gastroenterology, Hepatology and Nutrition  [facility]    Video-Visit Details    Type of service:  Video Visit    Video Start Time: 1:18 PM  Video End Time: 2:00 PM    Originating Location (pt. Location): Home    Distant Location (provider location):  [facility] [place]     Platform used for Video Visit: [employer]

## 2020-11-05 NOTE — PATIENT INSTRUCTIONS
PAN  ---PPI --take once daily before dinner  ---Budesonide foam  ---PA for Stelara   ------Referral to [facility] pharmacy for Stelara information/healthcare maintenance

## 2020-11-06 ENCOUNTER — DOCUMENTATION ONLY (OUTPATIENT)
Dept: GASTROENTEROLOGY | Facility: CLINIC | Age: 25
End: 2020-11-06

## 2020-11-06 NOTE — PROGRESS NOTES
Per Request (ELIO Morris)    Stool Kit (C-diff and fecal davian) mailed to Pt via [employer].      Bryant Ramirez LPN

## 2020-11-09 ENCOUNTER — CLINICAL UPDATE (OUTPATIENT)
Dept: PHARMACY | Facility: CLINIC | Age: 25
End: 2020-11-09
Payer: COMMERCIAL

## 2020-11-09 ENCOUNTER — PATIENT OUTREACH (OUTPATIENT)
Dept: GASTROENTEROLOGY | Facility: CLINIC | Age: 25
End: 2020-11-09

## 2020-11-09 DIAGNOSIS — K51.90 ULCERATIVE COLITIS (H): Primary | ICD-10-CM

## 2020-11-09 DIAGNOSIS — K51.911 ULCERATIVE COLITIS WITH RECTAL BLEEDING, UNSPECIFIED LOCATION (H): Primary | ICD-10-CM

## 2020-11-09 PROCEDURE — 99207 PR NO CHARGE LOS: CPT | Performed by: PHARMACIST

## 2020-11-09 RX ORDER — BUDESONIDE 28 MG/1
AEROSOL, FOAM RECTAL
Qty: 4 EACH | Refills: 0 | Status: SHIPPED | OUTPATIENT
Start: 2020-11-09 | End: 2020-12-07

## 2020-11-09 RX ORDER — BUDESONIDE 28 MG/1
AEROSOL, FOAM RECTAL
Qty: 4 EACH | Refills: 0 | Status: SHIPPED | OUTPATIENT
Start: 2020-11-09 | End: 2020-11-09

## 2020-11-09 NOTE — PROGRESS NOTES
Clinical Pharmacy Consult:                                                    Xochitl Caputo is a 25 year old female called for a clinical pharmacist consult.  She was referred to me from Dr. Dunbar.     Xochitl is not seen for CMR today due to coverage.    Reason for Consult: change from Entyvio to Stelara    Discussion:     We met back on 10/28 to review health maintenance, therefore our visit focused on questions related to Stelara therapy today.    1) What are examples of live vaccines?  She knows she should avoid these, but would like some information on live vaccines. Reviewed examples of live vaccines (intranasal flu, varicella, typhoid, yellow fever, MMR, etc.) in and beyond the context of her personal vaccination history.     Reviewed Stelara including mechanism of action, dosage forms (IV and pre-filled syringe), as well as general dosing, side effects (both common/serious) as well as precautions and monitoring. Encouraged avoidance of live vaccines and scheduling injection training for first self-injection dose. Discussed screening for infection as we may need to hold for signs/symptoms of infection. Reviewed availability of therapeutic drug monitoring, though this is not necessarily covered by insurance. Reviewed health maintenance items discussed at last visit. Given the pandemic, she prefers to hold-off of pneumovax at this time as she does not want to confuse post-vaccination inflammatory reaction with COVID. Plans to get labs after her fecal calprotectin kit is delivered to her. Discussed coverage for Stelara through medical and pharmacy benefit for IV and self-injection respectively. The infusion plan is currently entered and is awaiting determination.     Xochitl was appreciative of the conversation and had no further questions at the end of our conversation today. Contact information provided in the event she has further questions.    Plan:  1. Xochitl to await determination for Stelara, then  schedule infusion as directed

## 2020-11-09 NOTE — PROGRESS NOTES
Order placed for a therapy plan for a one time dose of stelara.  will request a PA from pharmacy.  patient Budesonide foam has also been ordered.americo has recently had a visit with the [facility] pharmacist will enquire to see if another referral is needed before it is placed.             PAN  ---PPI --take once daily before dinner  ---Budesonide foam  ---PA for Stelara   ------Referral to [facility] pharmacy for Stelara information/healthcare maintenance

## 2020-11-16 ENCOUNTER — HOME INFUSION (PRE-WILLOW HOME INFUSION) (OUTPATIENT)
Dept: PHARMACY | Facility: CLINIC | Age: 25
End: 2020-11-16

## 2020-11-16 ENCOUNTER — TELEPHONE (OUTPATIENT)
Dept: GASTROENTEROLOGY | Facility: CLINIC | Age: 25
End: 2020-11-16

## 2020-11-16 ENCOUNTER — MYC MEDICAL ADVICE (OUTPATIENT)
Dept: GASTROENTEROLOGY | Facility: CLINIC | Age: 25
End: 2020-11-16

## 2020-11-16 DIAGNOSIS — K51.90 ULCERATIVE COLITIS (H): ICD-10-CM

## 2020-11-16 RX ORDER — PREDNISONE 5 MG/1
5 TABLET ORAL DAILY
Qty: 75 TABLET | Refills: 0 | Status: SHIPPED | OUTPATIENT
Start: 2020-11-16 | End: 2020-12-07

## 2020-11-16 NOTE — TELEPHONE ENCOUNTER
Called and spoke to Radha in regard to her prednisone dosing.  Orders have been sent to her pharmacy to refill the prednisone. All patient questions were answered.

## 2020-11-17 DIAGNOSIS — K51.90 ULCERATIVE COLITIS (H): Primary | ICD-10-CM

## 2020-11-17 NOTE — TELEPHONE ENCOUNTER
----- Message from Ethan Phoenix sent at 11/16/2020  5:33 PM CST -----  Rex Morris,    We just received APPROVAL for one dose of Stelara.    Thank you!    Ethan Phoenix - [facility]    - [facility]   [facility]   [email or web address]  [email or web address]    Office: [phone]  Fax: [phone]  ----- Message -----  From: Alexandra Houston RN  Sent: 11/16/2020   2:57 PM CST  To: Alexandra Houston RN, [facility]    Any updates on this patient is scheduled for a COVID test on Wednesday and the infusion is scheduled for the 24th.      Alexandra  ----- Message -----  From: Phoenix, Ethan  Sent: 11/9/2020   5:51 PM CST  To: Alexandra Houston RN, [facility]    Rex Morris,    We have submitted a PA to the patient's insurance company. Turnaround is 5-10 business days.    Thank you!    [facility]    - Lead Infusion Therapy   [facility]   [email or web address]  [email or web address]    Office: [phone]  Fax: [phone]  ----- Message -----  From: Alexandra Houston RN  Sent: 11/9/2020  10:17 AM CST  To: Alexandra Houston RN, [facility]    Dr Bettina Magaña would like a one time dose of stelara for the patient.  [facility] will inf[place] at the [facility]      Thanks  Alexandra HUITRON RNCC  [phone]

## 2020-11-17 NOTE — TELEPHONE ENCOUNTER
Called patient and informed her that the stelara infusion has been approved.  patient was very happy will call [facility] and discharge her from the service tomorrow.     Patient reports that she is feeling MUCH better with the rectal foam, she is able to successfully decrease the prednisone to 20 mg.

## 2020-11-17 NOTE — PROGRESS NOTES
This is a recent snapshot of the patient's [facility] medical record.  For current drug dose and complete information and questions, call [phone] or In Basket pool, fv home infusion ([id number])  CSN Number:  [id number]

## 2020-11-18 DIAGNOSIS — K51.90 ULCERATIVE COLITIS (H): ICD-10-CM

## 2020-11-18 PROCEDURE — U0003 INFECTIOUS AGENT DETECTION BY NUCLEIC ACID (DNA OR RNA); SEVERE ACUTE RESPIRATORY SYNDROME CORONAVIRUS 2 (SARS-COV-2) (CORONAVIRUS DISEASE [COVID-19]), AMPLIFIED PROBE TECHNIQUE, MAKING USE OF HIGH THROUGHPUT TECHNOLOGIES AS DESCRIBED BY CMS-2020-01-R: HCPCS | Performed by: INTERNAL MEDICINE

## 2020-11-19 LAB
LABORATORY COMMENT REPORT: NORMAL
SARS-COV-2 RNA SPEC QL NAA+PROBE: NEGATIVE
SARS-COV-2 RNA SPEC QL NAA+PROBE: NORMAL
SPECIMEN SOURCE: NORMAL
SPECIMEN SOURCE: NORMAL

## 2020-11-20 NOTE — PROGRESS NOTES
"[facility] Outpatient Medical Nutrition Therapy      Xochitl Caputo is a 25 year old female who is being evaluated via a billable video visit.      The patient has been notified of following:     \"This video visit will be conducted via a call between you and your physician/provider. We have found that certain health care needs can be provided without the need for an in-person physical exam.  This service lets us provide the care you need with a video conversation.  If a prescription is necessary we can send it directly to your pharmacy.  If lab work is needed we can place an order for that and you can then stop by our lab to have the test done at a later time.    Video visits are billed at different rates depending on your insurance coverage.  Please reach out to your insurance provider with any questions.    If during the course of the call the physician/provider feels a video visit is not appropriate, you will not be charged for this service.\"    Patient has given verbal consent for Video visit? Yes  How would you like to obtain your AVS? MyChart  If you are dropped from the video visit, the video invite should be resent to: Send to e-mail at: [email or web address]  Will anyone else be joining your video visit? No        Video-Visit Details    Type of service:  Video Visit    Video Start Time: 12:32 PM  Video End Time: 1:25 PM    Originating Location (pt. Location): Home    Distant Location (provider location): [facility] [place]     Platform used for Video Visit: OnePage[employer]M    During this virtual visit the patient is located in [place], patient verifies this as the location during the entirety of this visit.     John Jeffries, PhD, RD    Additional provider notes:  Referring Physician: Bettina  Reason for RD Visit: IBD     Nutrition Plan: Lower fat, soft-texture, soluble fiber rich diet    Recommendations for MD/Provider to order: None at this time    Malnutrition Diagnosis: " "Patient does not meet the above criteria necessary for diagnosing malnutrition    Nutrition Assessment:  Patient is here for intial visit with Registered Dietitian (NOE).  Patient is a 25 year old female with history of UC diagnosed in 2017. Recently seen by Dr. Dunbar 11/5 at which time it was noted that she has experienced an ~20 pound weight loss since September. At that visit the decision was made to initiate stelara. Today she reports that first infusion will be tomorrow afternoon. Regarding weight, she reports that she last weighed herself a couple of weeks ago and had gained \"a couple of pounds\" back. She believes current weight is around 130 pounds, which is up ~10 pounds from lowest weight of 120 pounds. She reports visible loss of muscle mass associated with this weight loss and decreased activity. Notes that she has recently restarted doing some light resistance exercise.     Past Surgical History: No IBD surgical history    Symptom Review  1. Nausea/vomiting? Yes: Reports some nausea and vomiting that she attributes to gluten consumption, but also notes that would have been due for an entyvio infusion today if she was still on that  2. Heartburn? Yes: With prednisone. Started taking omeprazole and this has helped  3. Bloating? No  4. Feeling full quickly? No  5. Decreased appetite? No  6. Weight loss/gain? Weight loss from 135-140 down to 120, but has since regained back to 130 pounds  7. Constipation/Diarrhea? Yes: Diarrhea. Notes that all BMs occur before 10 AM  8. Urgency? Yes: Just in the mornings, but this has been improving  9. Incomplete Bowel Emptying? Yes: Feels like she experiences this when nauseas  10. Abdominal pain/pain with or without eating? No  11. Feeling tired? Yes: Feels like she has a hard time getting out of bed in the morning, but feels fine during the day    Dietary beliefs and Practices  1.  Did you modify your diet leading up to diagnosis OR Have you modified your diet since " diagnosis?  Yes Prior to official diagnosis was sick for the year leading up to diagnosis, but in that time removed gluten from diet. Has since removed eggs as well. Very strict about gluten and eggs, and tries to avoid dairy, soy, refined sugars, and processed foods.  2.  Do you believe that certain foods increase the risk of developing IBD? NA  3.  Do you believe that food/nutrition is an important part of your disease management?  Yes   As important as medications  4.  Are there specific foods you avoid? Yes: Symptom management and Prevent relapse: See above  5.  Do you believe that specific foods can cause relapse? NA  6.  Are there specific foods or food groups that you feel help? (symptoms/relapse) Yes: [place] potatoes, chicken, bone broth, applesauce, white rice, rice noodles  7.  Do you avoid some foods or food groups for fear of worsening the disease flare? NA  8.  Have you received prior advice on diet?  Yes   A. If so, source? NA  9.  Have you, or do you follow, a specific diet?  Yes   A. Which one(s)?  AIP, SCD, low FODMAP   B. Did/Do you find it beneficial?  Feels like she has taken something away from each of those, but     I. If able to articulate, what specifically is the benefit? NA  10.  Do you take any vitamin, mineral, or herbal supplements? Yes: Fish oil, turmeric, vitamin D, multvitamin, calcium, ultra-inflammex powder, visbiome (probiotic)  11.  Do you use any calorie/protein supplements?  No  12.  Do you think IBD has influenced your pleasure in eating?  Yes: es being able to order pizza when she wants to or be able to share meals when eating out  13.  Do you feel stressed or anxious about eating? If so why? No  14.  Do you avoid eating in social settings (e.g. Restaurants)? NA    Diet Recall:  (Typical Day)  Meal Name Time Food    Breakfast  Tea, 2 pieces of rodriguez, toast        Lunch  Micky chicken sausage with tortilla AND russett potato, rice, or tortilla chips        Dinner   Protein with carbs        Snacks  Rice crackers, seaweed snacks, tortilla chips, cookies   Beverages  Water primarily   Alcohol   Glass of wine 1-2x/wk   --Sometimes will have smoothies: 2x/wk - blueberries + another berry/yana, water, collagen, and protein powder    Frequency of eating/taking out meals: 1-2x/wk  Food access/availability: Fine  Food preparation confidence/abilities: Fine    Anthropometrics:   Height: Data Unavailable  Weight: 128-130 pounds  BMI: There is no height or weight on file to calculate BMI.    Weight History:  Wt Readings from Last 10 Encounters:   11/05/20 59.9 kg (132 lb)   10/21/20 59 kg (130 lb)     Usual Weight: 135 pounds  Weight change in past 6 months: Increased up to 143-145 and lowest it has been is 120-119 pounds. Would guess right now it is around 128-130    Labs: Reviewed. No recent  Pertinent Medications/vitamin and mineral supplements:   Current Outpatient Medications   Medication     Black Pepper-Turmeric 5-1000 MG CAPS     Budesonide 2 MG/ACT FOAM     calcium carbonate (OS-KATIA) 1500 (600 Ca) MG tablet     cannabidiol (EPIDIOLEX) 100 MG/ML oral solution     clindamycin (CLINDAMAX) 1 % external gel     Ferrous Sulfate 324 (65 Fe) MG TBEC     HERBALS     levothyroxine (SYNTHROID/LEVOTHROID) 50 MCG tablet     multivitamin w/minerals (THERA-VIT-M) tablet     naltrexone (DEPADE/REVIA) 50 MG tablet     Omega-3 Fatty Acids (OMEGA-3 EPA FISH OIL PO)     omeprazole (PRILOSEC) 40 MG DR capsule     predniSONE (DELTASONE) 5 MG tablet     Resveratrol 250 MG CAPS     saccharomyces boulardii (FLORASTOR) 250 MG capsule     spironolactone (ALDACTONE) 50 MG tablet     Vedolizumab (ENTYVIO IV)     Vitamin D3 (CHOLECALCIFEROL) 125 MCG (5000 UT) tablet     No current facility-administered medications for this visit.        Food Allergies: NA  Food Intolerances: See above  Physical Activity: Recently started doing pilates  Estimated Nutrition Needs based on most recent body weight of 59 kg:  1500 calories (25 kcals/kg), 60 g protein (1.0 g/kg), ~1 ml/kcal or total fluids per MD.     MALNUTRITION:  % Weight Loss:  Weight loss does not meet criteria for malnutrition due to recent weight gain  % Intake:  No decreased intake noted  Subcutaneous Fat Loss:  None observed  Muscle Loss:  Visible loss of muscle mass  Fluid Retention:  None noted    Nutrition Diagnosis:    Food and nutrition related knowledge deficit related to IBD as evidenced by need for diet education.    Nutrition Prescription: Regular diet    Nutrition Intervention:    Nutrition Education/Counseling:  Discussed diet and IBD history. Reviewed components of common diets currently proposed for IBD: Autoimmune protocol diet, IBD Anti-Inflammatory diet, Specific carbohydrate diet, Semi-vegetarian diet. Discussed that there is limited data on diet in IBD, but that these diets have been followed with some success in IBD. Highlighted that these all have a similar general theme geared towards more of a plant-based, less-processed diet composition. Introduced the IBD Anti-inflammatory diet food list. Discussed that certain aspects of the diet (e.g. gluten free, dairy free) may be overly restrictive, but that the food list seems to provide a reasonable texture-based diet advancement that can help guide food reintroduction. Discussed increasing intake of prebiotic/soluble fiber containing foods (e.g. ground flaxseed, cyndi seeds) and consuming a more consistent intake of fiber throughout the day.     Educational Materials Provided: IBD-AID foods list  Patient verbalized understanding of education provided. See all recommendations under Goals.    Goals:  1. Lower fat diet. Emphasize avoidance of greasy or fried foods    2. Increase intake of soluble fiber. Utilize the IBD AID food list (Phase I and II) to help identify soft-texture sources    Nutrition Monitoring and Evaluation: Will monitor adherence to nutrition recommendations at future RD visits.      Further Medical Nutrition Therapy: Yes  Next Appointment (if applicable): 3 month  Patient was encouraged to call/contact RD with any further questions.

## 2020-11-23 ENCOUNTER — VIRTUAL VISIT (OUTPATIENT)
Dept: GASTROENTEROLOGY | Facility: CLINIC | Age: 25
End: 2020-11-23
Payer: COMMERCIAL

## 2020-11-23 DIAGNOSIS — K51.90 ULCERATIVE COLITIS (H): Primary | ICD-10-CM

## 2020-11-23 DIAGNOSIS — Z71.3 NUTRITIONAL COUNSELING: ICD-10-CM

## 2020-11-23 PROCEDURE — 97802 MEDICAL NUTRITION INDIV IN: CPT | Mod: 95 | Performed by: DIETITIAN, REGISTERED

## 2020-11-23 NOTE — PATIENT INSTRUCTIONS
Rex Leung,    It was great meeting you today. Below is a summary of what we discussed:    1. Lower fat diet. Emphasize avoidance of greasy or fried foods    2. Increase intake of soluble fiber. Utilize the IBD AID food list (Phase I and II) to help identify soft-texture sources    Best regards,  John Jeffries, PhD, RD

## 2020-11-23 NOTE — LETTER
"  11/23/2020       RE: Xochitl Caputo  [address]  [facility] [address]      Dear Colleague,    Thank you for referring your patient, Xochitl Caputo, to the [facility]STR[facility]. Please see a copy of my visit note below.    [facility] Outpatient Medical Nutrition Therapy      Xochitl Caputo is a 25 year old female who is being evaluated via a billable video visit.      The patient has been notified of following:     \"This video visit will be conducted via a call between you and your physician/provider. We have found that certain health care needs can be provided without the need for an in-person physical exam.  This service lets us provide the care you need with a video conversation.  If a prescription is necessary we can send it directly to your pharmacy.  If lab work is needed we can place an order for that and you can then stop by our lab to have the test done at a later time.    Video visits are billed at different rates depending on your insurance coverage.  Please reach out to your insurance provider with any questions.    If during the course of the call the physician/provider feels a video visit is not appropriate, you will not be charged for this service.\"    Patient has given verbal consent for Video visit? Yes  How would you like to obtain your AVS? MyChart  If you are dropped from the video visit, the video invite should be resent to: Send to e-mail at: [email or web address]  Will anyone else be joining your video visit? No        Video-Visit Details    Type of service:  Video Visit    Video Start Time: 12:32 PM  Video End Time: 1:25 PM    Originating Location (pt. Location): Home    Distant Location (provider location): [facility]STR[facility]     Platform used for Video Visit: [employer]    During this virtual visit the patient is located in [place], patient verifies this as the location during the entirety of this visit. " "    John Jeffries, PhD, RD    Additional provider notes:  Referring Physician: Bettina  Reason for RD Visit: IBD     Nutrition Plan: Lower fat, soft-texture, soluble fiber rich diet    Recommendations for MD/Provider to order: None at this time    Malnutrition Diagnosis: Patient does not meet the above criteria necessary for diagnosing malnutrition    Nutrition Assessment:  Patient is here for intial visit with Registered Dietitian (RD).  Patient is a 25 year old female with history of UC diagnosed in 2017. Recently seen by Dr. Dunbar 11/5 at which time it was noted that she has experienced an ~20 pound weight loss since September. At that visit the decision was made to initiate stelara. Today she reports that first infusion will be tomorrow afternoon. Regarding weight, she reports that she last weighed herself a couple of weeks ago and had gained \"a couple of pounds\" back. She believes current weight is around 130 pounds, which is up ~10 pounds from lowest weight of 120 pounds. She reports visible loss of muscle mass associated with this weight loss and decreased activity. Notes that she has recently restarted doing some light resistance exercise.     Past Surgical History: No IBD surgical history    Symptom Review  1. Nausea/vomiting? Yes: Reports some nausea and vomiting that she attributes to gluten consumption, but also notes that would have been due for an entyvio infusion today if she was still on that  2. Heartburn? Yes: With prednisone. Started taking omeprazole and this has helped  3. Bloating? No  4. Feeling full quickly? No  5. Decreased appetite? No  6. Weight loss/gain? Weight loss from 135-140 down to 120, but has since regained back to 130 pounds  7. Constipation/Diarrhea? Yes: Diarrhea. Notes that all BMs occur before 10 AM  8. Urgency? Yes: Just in the mornings, but this has been improving  9. Incomplete Bowel Emptying? Yes: Feels like she experiences this when nauseas  10. Abdominal pain/pain with " or without eating? No  11. Feeling tired? Yes: Feels like she has a hard time getting out of bed in the morning, but feels fine during the day    Dietary beliefs and Practices  1.  Did you modify your diet leading up to diagnosis OR Have you modified your diet since diagnosis?  Yes Prior to official diagnosis was sick for the year leading up to diagnosis, but in that time removed gluten from diet. Has since removed eggs as well. Very strict about gluten and eggs, and tries to avoid dairy, soy, refined sugars, and processed foods.  2.  Do you believe that certain foods increase the risk of developing IBD? NA  3.  Do you believe that food/nutrition is an important part of your disease management?  Yes   As important as medications  4.  Are there specific foods you avoid? Yes: Symptom management and Prevent relapse: See above  5.  Do you believe that specific foods can cause relapse? NA  6.  Are there specific foods or food groups that you feel help? (symptoms/relapse) Yes: [place] potatoes, chicken, bone broth, applesauce, white rice, rice noodles  7.  Do you avoid some foods or food groups for fear of worsening the disease flare? NA  8.  Have you received prior advice on diet?  Yes   A. If so, source? NA  9.  Have you, or do you follow, a specific diet?  Yes   A. Which one(s)?  AIP, SCD, low FODMAP   B. Did/Do you find it beneficial?  Feels like she has taken something away from each of those, but     I. If able to articulate, what specifically is the benefit? NA  10.  Do you take any vitamin, mineral, or herbal supplements? Yes: Fish oil, turmeric, vitamin D, multvitamin, calcium, ultra-inflammex powder, visbiome (probiotic)  11.  Do you use any calorie/protein supplements?  No  12.  Do you think IBD has influenced your pleasure in eating?  Yes: Misses being able to order pizza when she wants to or be able to share meals when eating out  13.  Do you feel stressed or anxious about eating? If so why? No  14.  Do you  avoid eating in social settings (e.g. Restaurants)? NA    Diet Recall:  (Typical Day)  Meal Name Time Food    Breakfast  Tea, 2 pieces of rodriguez, toast        Lunch  Blanding chicken sausage with tortilla AND russett potato, rice, or tortilla chips        Dinner  Protein with carbs        Snacks  Rice crackers, seaweed snacks, tortilla chips, cookies   Beverages  Water primarily   Alcohol   Glass of wine 1-2x/wk   --Sometimes will have smoothies: 2x/wk - blueberries + another berry/yana, water, collagen, and protein powder    Frequency of eating/taking out meals: 1-2x/wk  Food access/availability: Fine  Food preparation confidence/abilities: Fine    Anthropometrics:   Height: Data Unavailable  Weight: 128-130 pounds  BMI: There is no height or weight on file to calculate BMI.    Weight History:  Wt Readings from Last 10 Encounters:   11/05/20 59.9 kg (132 lb)   10/21/20 59 kg (130 lb)     Usual Weight: 135 pounds  Weight change in past 6 months: Increased up to 143-145 and lowest it has been is 120-119 pounds. Would guess right now it is around 128-130    Labs: Reviewed. No recent  Pertinent Medications/vitamin and mineral supplements:   Current Outpatient Medications   Medication     Black Pepper-Turmeric 5-1000 MG CAPS     Budesonide 2 MG/ACT FOAM     calcium carbonate (OS-KATIA) 1500 (600 Ca) MG tablet     cannabidiol (EPIDIOLEX) 100 MG/ML oral solution     clindamycin (CLINDAMAX) 1 % external gel     Ferrous Sulfate 324 (65 Fe) MG TBEC     HERBALS     levothyroxine (SYNTHROID/LEVOTHROID) 50 MCG tablet     multivitamin w/minerals (THERA-VIT-M) tablet     naltrexone (DEPADE/REVIA) 50 MG tablet     Omega-3 Fatty Acids (OMEGA-3 EPA FISH OIL PO)     omeprazole (PRILOSEC) 40 MG DR capsule     predniSONE (DELTASONE) 5 MG tablet     Resveratrol 250 MG CAPS     saccharomyces boulardii (FLORASTOR) 250 MG capsule     spironolactone (ALDACTONE) 50 MG tablet     Vedolizumab (ENTYVIO IV)     Vitamin D3 (CHOLECALCIFEROL) 125  MCG (5000 UT) tablet     No current facility-administered medications for this visit.        Food Allergies: NA  Food Intolerances: See above  Physical Activity: Recently started doing pilates  Estimated Nutrition Needs based on most recent body weight of 59 k calories (25 kcals/kg), 60 g protein (1.0 g/kg), ~1 ml/kcal or total fluids per MD.     MALNUTRITION:  % Weight Loss:  Weight loss does not meet criteria for malnutrition due to recent weight gain  % Intake:  No decreased intake noted  Subcutaneous Fat Loss:  None observed  Muscle Loss:  Visible loss of muscle mass  Fluid Retention:  None noted    Nutrition Diagnosis:    Food and nutrition related knowledge deficit related to IBD as evidenced by need for diet education.    Nutrition Prescription: Regular diet    Nutrition Intervention:    Nutrition Education/Counseling:  Discussed diet and IBD history. Reviewed components of common diets currently proposed for IBD: Autoimmune protocol diet, IBD Anti-Inflammatory diet, Specific carbohydrate diet, Semi-vegetarian diet. Discussed that there is limited data on diet in IBD, but that these diets have been followed with some success in IBD. Highlighted that these all have a similar general theme geared towards more of a plant-based, less-processed diet composition. Introduced the IBD Anti-inflammatory diet food list. Discussed that certain aspects of the diet (e.g. gluten free, dairy free) may be overly restrictive, but that the food list seems to provide a reasonable texture-based diet advancement that can help guide food reintroduction. Discussed increasing intake of prebiotic/soluble fiber containing foods (e.g. ground flaxseed, cyndi seeds) and consuming a more consistent intake of fiber throughout the day.     Educational Materials Provided: IBD-AID foods list  Patient verbalized understanding of education provided. See all recommendations under Goals.    Goals:  1. Lower fat diet. Emphasize avoidance of  greasy or fried foods    2. Increase intake of soluble fiber. Utilize the IBD AID food list (Phase I and II) to help identify soft-texture sources    Nutrition Monitoring and Evaluation: Will monitor adherence to nutrition recommendations at future RD visits.     Further Medical Nutrition Therapy: Yes  Next Appointment (if applicable): 3 month  Patient was encouraged to call/contact RD with any further questions.    Again, thank you for allowing me to participate in the care of your patient.      Sincerely,    John Jeffries RD

## 2020-11-24 ENCOUNTER — TELEPHONE (OUTPATIENT)
Dept: GASTROENTEROLOGY | Facility: CLINIC | Age: 25
End: 2020-11-24

## 2020-11-24 ENCOUNTER — INFUSION THERAPY VISIT (OUTPATIENT)
Dept: INFUSION THERAPY | Facility: CLINIC | Age: 25
End: 2020-11-24
Attending: INTERNAL MEDICINE
Payer: COMMERCIAL

## 2020-11-24 VITALS
DIASTOLIC BLOOD PRESSURE: 66 MMHG | HEART RATE: 78 BPM | WEIGHT: 126.8 LBS | BODY MASS INDEX: 19.86 KG/M2 | OXYGEN SATURATION: 98 % | RESPIRATION RATE: 16 BRPM | TEMPERATURE: 98.1 F | SYSTOLIC BLOOD PRESSURE: 99 MMHG

## 2020-11-24 DIAGNOSIS — D84.9 IMMUNOSUPPRESSION (H): ICD-10-CM

## 2020-11-24 DIAGNOSIS — K51.911 ULCERATIVE COLITIS WITH RECTAL BLEEDING, UNSPECIFIED LOCATION (H): Primary | ICD-10-CM

## 2020-11-24 DIAGNOSIS — K51.311 ULCERATIVE RECTOSIGMOIDITIS WITH RECTAL BLEEDING (H): ICD-10-CM

## 2020-11-24 DIAGNOSIS — K51.00 ULCERATIVE PANCOLITIS (H): ICD-10-CM

## 2020-11-24 LAB
ALBUMIN SERPL-MCNC: 2.9 G/DL (ref 3.4–5)
ALP SERPL-CCNC: 51 U/L (ref 40–150)
ALT SERPL W P-5'-P-CCNC: 14 U/L (ref 0–50)
ANION GAP SERPL CALCULATED.3IONS-SCNC: 6 MMOL/L (ref 3–14)
AST SERPL W P-5'-P-CCNC: 6 U/L (ref 0–45)
BASOPHILS # BLD AUTO: 0.1 10E9/L (ref 0–0.2)
BASOPHILS NFR BLD AUTO: 0.6 %
BILIRUB SERPL-MCNC: 0.2 MG/DL (ref 0.2–1.3)
BUN SERPL-MCNC: 9 MG/DL (ref 7–30)
CALCIUM SERPL-MCNC: 9 MG/DL (ref 8.5–10.1)
CHLORIDE SERPL-SCNC: 100 MMOL/L (ref 94–109)
CO2 SERPL-SCNC: 30 MMOL/L (ref 20–32)
CREAT SERPL-MCNC: 0.66 MG/DL (ref 0.52–1.04)
CRP SERPL-MCNC: 32.2 MG/L (ref 0–8)
DIFFERENTIAL METHOD BLD: ABNORMAL
EOSINOPHIL # BLD AUTO: 1.1 10E9/L (ref 0–0.7)
EOSINOPHIL NFR BLD AUTO: 7.3 %
ERYTHROCYTE [DISTWIDTH] IN BLOOD BY AUTOMATED COUNT: 14.9 % (ref 10–15)
ERYTHROCYTE [SEDIMENTATION RATE] IN BLOOD BY WESTERGREN METHOD: 72 MM/H (ref 0–20)
GFR SERPL CREATININE-BSD FRML MDRD: >90 ML/MIN/{1.73_M2}
GLUCOSE SERPL-MCNC: 88 MG/DL (ref 70–99)
HCT VFR BLD AUTO: 35.4 % (ref 35–47)
HGB BLD-MCNC: 11.2 G/DL (ref 11.7–15.7)
IMM GRANULOCYTES # BLD: 0.1 10E9/L (ref 0–0.4)
IMM GRANULOCYTES NFR BLD: 0.4 %
LYMPHOCYTES # BLD AUTO: 1.5 10E9/L (ref 0.8–5.3)
LYMPHOCYTES NFR BLD AUTO: 10.4 %
MCH RBC QN AUTO: 28.9 PG (ref 26.5–33)
MCHC RBC AUTO-ENTMCNC: 31.6 G/DL (ref 31.5–36.5)
MCV RBC AUTO: 91 FL (ref 78–100)
MONOCYTES # BLD AUTO: 1.1 10E9/L (ref 0–1.3)
MONOCYTES NFR BLD AUTO: 7.7 %
NEUTROPHILS # BLD AUTO: 10.8 10E9/L (ref 1.6–8.3)
NEUTROPHILS NFR BLD AUTO: 73.6 %
NRBC # BLD AUTO: 0 10*3/UL
NRBC BLD AUTO-RTO: 0 /100
PLATELET # BLD AUTO: 408 10E9/L (ref 150–450)
POTASSIUM SERPL-SCNC: 3.6 MMOL/L (ref 3.4–5.3)
PROT SERPL-MCNC: 6.8 G/DL (ref 6.8–8.8)
RBC # BLD AUTO: 3.88 10E12/L (ref 3.8–5.2)
SODIUM SERPL-SCNC: 135 MMOL/L (ref 133–144)
WBC # BLD AUTO: 14.6 10E9/L (ref 4–11)

## 2020-11-24 PROCEDURE — 85025 COMPLETE CBC W/AUTO DIFF WBC: CPT | Performed by: INTERNAL MEDICINE

## 2020-11-24 PROCEDURE — 86140 C-REACTIVE PROTEIN: CPT | Performed by: INTERNAL MEDICINE

## 2020-11-24 PROCEDURE — 258N000003 HC RX IP 258 OP 636: Performed by: INTERNAL MEDICINE

## 2020-11-24 PROCEDURE — 96374 THER/PROPH/DIAG INJ IV PUSH: CPT

## 2020-11-24 PROCEDURE — 85652 RBC SED RATE AUTOMATED: CPT | Performed by: INTERNAL MEDICINE

## 2020-11-24 PROCEDURE — 87340 HEPATITIS B SURFACE AG IA: CPT | Performed by: INTERNAL MEDICINE

## 2020-11-24 PROCEDURE — 80053 COMPREHEN METABOLIC PANEL: CPT | Performed by: INTERNAL MEDICINE

## 2020-11-24 PROCEDURE — 250N000011 HC RX IP 250 OP 636: Performed by: INTERNAL MEDICINE

## 2020-11-24 PROCEDURE — 86481 TB AG RESPONSE T-CELL SUSP: CPT | Performed by: INTERNAL MEDICINE

## 2020-11-24 PROCEDURE — 86704 HEP B CORE ANTIBODY TOTAL: CPT | Performed by: INTERNAL MEDICINE

## 2020-11-24 PROCEDURE — 86706 HEP B SURFACE ANTIBODY: CPT | Performed by: INTERNAL MEDICINE

## 2020-11-24 RX ADMIN — USTEKINUMAB 390 MG: 130 SOLUTION INTRAVENOUS at 13:21

## 2020-11-24 NOTE — LETTER
11/24/2020         RE: Xochitl Caputo  [address]  [facility] [address]        Dear Colleague,    Thank you for referring your patient, Xochitl Caputo, to the [facility]. Please see a copy of my visit note below.    Nursing Note  Xochitl Caputo [facility] for:   Chief Complaint   Patient presents with     Infusion     Stelara     During today's [facility] appointment, orders from Dr. Dunbar were completed.  Frequency: once in [facility], then moving to home injection every 8 weeks. First dose.    Progress note:  Patient identification verified by name and date of birth.  Assessment completed.  Vitals recorded in Doc Flowsheets.  Patient was provided with education regarding medication/procedure and possible side effects.  Patient verbalized understanding.     present during visit today: Not Applicable.    Treatment Conditions: ~~~ NOTE: If the patient answers yes to any of the questions below, hold the infusion and contact ordering provider or on-call provider.    1. Have you recently had an elevated temperature, fever, chills, productive cough, coughing for 3 weeks or longer or hemoptysis, abnormal vital signs, night sweats,  chest pain or have you noticed a decrease in your appetite, unexplained weight loss or fatigue? No  2. Do you have any open wounds or new incisions? No  3. Do you have any recent or upcoming hospitalizations, surgeries or dental procedures? No  4. Do you currently have or recently have had any signs of illness or infection or are you on any antibiotics? No  5. Have you had any new, sudden or worsening abdominal pain? No  6. Have you or anyone in your household received a live vaccination in the past 4 weeks? Please note:  No live vaccines while on biologic/chemotherapy until 6 months after the last treatment.  Patient can receive the flu  vaccine (shot only) and the pneumovax.  It is optimal for the patient to get these vaccines mid cycle, but they can be given at any time as long as it is not on the day of the infusion. No  7. Have you recently been diagnosed with any new nervous system diseases (ie. Multiple sclerosis, Guillain [place], seizures, neurological changes) or cancer diagnosis? No  8. Are you on any form of radiation or chemotherapy? No  9. Are you pregnant or breast feeding or do you have plans of pregnancy in the future? No  10. Have you been having any signs of worsening depression or suicidal ideations?  (benlysta only) NA  11. Have there been any other new onset medical symptoms? No      Premedications: were not ordered, patient's first dose    Drug Waste Record: No    Infusion length and rate:  infusion given over approximately 60 minutes at 250mL/hr.    Labs: were drawn per orders, including Hepatitis B antibody/antigen and quantiferon TB    Vascular access: peripheral IV placed today.    Post Infusion Assessment:  Patient tolerated infusion well and without incident. Printed AVS given to patient, reviewed discharge instructions with patient including medication side effects and indications for emergency medical follow-up. Patient verbalized understanding and denied further questions/concerns. [facility], will follow-up with GI clinic for home injections.    Discharge Plan:   Follow up plan of care with: ongoing infusions at [facility]., ordering provider as scheduled. and after visit summary given to patient  Discharge instructions were reviewed with patient.  Patient/representative verbalized understanding of discharge instructions and all questions answered.  Patient discharged from [facility] in stable condition.    Tiffany Awan RN       Administrations This Visit     ustekinumab (STELARA) 390 mg in sodium chloride 0.9 % 250 mL infusion      Admin Date  11/24/2020 Action  New Bag Dose  390 mg Rate  250 mL/hr Route  Intravenous Administered By  Tiffany Mcmahon, RN                BP 99/66   Pulse 78   Temp 98.1  F (36.7  C) (Oral)   Resp 16   Wt 57.5 kg (126 lb 12.8 oz)   SpO2 98%   BMI 19.86 kg/m         Hepatitis and TB testing not completed. TORB: Okay to proceed with stelara infusion without lab results. Maria Luisa Dunbar      Again, thank you for allowing me to participate in the care of your patient.        Sincerely,        [facility]

## 2020-11-24 NOTE — PATIENT INSTRUCTIONS
Dear Xochitl Caputo    Thank you for choosing [facility] ([facility]) for your Stelara infusion.  The following information is a summary of our appointment as well as important reminders.      Patient Education     Ustekinumab injection  Brand Name: Stelara  What is this medicine?  USTEKINUMAB (US te KIN ue mab) is used to treat plaque psoriasis and psoriatic arthritis. It is also used to treat Crohn's disease and ulcerative colitis. It is not a cure.  How should I use this medicine?  This medicine is for injection under the skin or infusion into a vein. It is usually given by a health care professional in a hospital or clinic setting. If you get this medicine at home, you will be taught how to prepare and give this medicine. Use exactly as directed. Take your medicine at regular intervals. Do not take your medicine more often than directed.  It is important that you put your used needles and syringes in a special sharps container. Do not put them in a trash can. If you do not have a sharps container, call your pharmacist or healthcare provider to get one.  A special MedGuide will be given to you by the pharmacist with each prescription and refill. Be sure to read this information carefully each time.  Talk to your pediatrician regarding the use of this medicine in children. While this drug may be prescribed for children as young as 12 years for selected conditions, precautions do apply.  What side effects may I notice from receiving this medicine?  Side effects that you should report to your doctor or health care professional as soon as possible:    allergic reactions like skin rash, itching or hives, swelling of the face, lips, or tongue    breathing problems    changes in vision    confusion    persistent headache    seizures    signs and symptoms of infection like fever or chills; cough; sore throat; pain or trouble passing urine    swollen lymph nodes  in the neck, underarm, or groin areas    unexplained weight loss    unusually weak or tired  Side effects that usually do not require medical attention (report to your doctor or health care professional if they continue or are bothersome):    diarrhea    headache    nausea, vomiting    redness, itching, swelling, or bruising at site where injected    stomach pain    tiredness  What may interact with this medicine?  Do not take this medicine with any of the following medications:    live virus vaccines  This medicine may also interact with the following medications:    cyclosporine    biologic medicines such as abatacept, adalimumab, anakinra, certolizumab, etanercept, golimumab, infliximab, rituximab, secukinumab, tocilizumab    warfarin  What if I miss a dose?  If you miss a dose, take it as soon as you can. If it is almost time for your next dose, take only that dose. Do not take double or extra doses.  Where should I keep my medicine?  Keep out of the reach of children.  If you are using this medicine at home, you will be instructed on how to store this medicine. Store the prefilled syringes or unopened vials in a refrigerator between 2 to 8 degrees C (36 to 46 degrees F). The vials should be stored upright. Keep in the original carton. Protect from light. Do not freeze. Do not shake. Throw away any unused medicine after the expiration date on the label.  What should I tell my health care provider before I take this medicine?  They need to know if you have any of these conditions:    cancer    diabetes    history of skin cancer    immune system problems    infection (especially a virus infection such as chickenpox, cold sores, or herpes) or history of infections    new or changing lesions on your skin    receiving or have received allergy shots    receive or have received phototherapy for the skin    recently received or scheduled to receive a vaccine    tuberculosis, a positive skin test for tuberculosis, or  have recently been in close contact with someone who has tuberculosis    an unusual reaction to ustekinumab, latex, other medicines, foods, dyes, or preservatives    pregnant or trying to get pregnant    breast-feeding  What should I watch for while using this medicine?  Your condition will be monitored carefully while you are receiving this medicine. Tell your doctor or healthcare professional if your symptoms do not start to get better or if they get worse.  You will be tested for tuberculosis (TB) before you start this medicine. If your doctor prescribes any medicine for TB, you should start taking the TB medicine before starting this medicine. Make sure to finish the full course of TB medicine.  Call your doctor or health care professional if you get a cold or other infection while receiving this medicine. Do not treat yourself. This medicine may decrease your body's ability to fight infection.  Talk to your doctor about your risk of cancer. You may be more at risk for certain types of cancers if you take this medicine.  NOTE:This sheet is a summary. It may not cover all possible information. If you have questions about this medicine, talk to your doctor, pharmacist, or health care provider. Copyright  2020 [employer]    EDUCATION POST BIOLOGICAL/CHEMOTHERAPY INFUSION  Call the triage nurse at your clinic or seek medical attention if you have chills and/or temperature greater than or equal to 100.5, uncontrolled nausea/vomiting, diarrhea, constipation, dizziness, shortness of breath, chest pain, heart palpitations, weakness or any other new or concerning symptoms, questions or concerns.  You can not have any live virus vaccines prior to or during treatment or up to 6 months post infusion.  If you have an upcoming surgery, medical procedure or dental procedure during treatment, this should be discussed with your ordering physician and your surgeon/dentist.  If you are having any concerning symptom, if you are  unsure if you should get your next infusion or wish to speak to a provider before your next infusion, please call your care coordinator or triage nurse at your clinic to notify them so we can adequately serve you.         We look forward in seeing you on your next appointment here at [facility] ([facility]).  Please don t hesitate to call us at [phone] to reschedule any of your appointments or to speak with one of the [facility] registered nurses.  It was a pleasure taking care of you today.    Sincerely,    [facility]  [facility]  [address] [place] [address]  [id number]  Phone:  [phone]

## 2020-11-24 NOTE — PROGRESS NOTES
Nursing Note  Xochitl Caputo presents today to [facility] for:   Chief Complaint   Patient presents with     Infusion     Stelara     During [facility]'s [facility] appointment, orders from Dr. Dunbar were completed.  [facility] then moving to home injection every 8 weeks. First dose.    Progress note:  Patient identification verified by name and date of birth.  Assessment completed.  Vitals recorded in Doc Flowsheets.  Patient was provided with education regarding medication/procedure and possible side effects.  Patient verbalized understanding.     present during visit today: Not Applicable.    Treatment Conditions: ~~~ NOTE: If the patient answers yes to any of the questions below, hold the infusion and contact ordering provider or on-call provider.    1. Have you recently had an elevated temperature, fever, chills, productive cough, coughing for 3 weeks or longer or hemoptysis, abnormal vital signs, night sweats,  chest pain or have you noticed a decrease in your appetite, unexplained weight loss or fatigue? No  2. Do you have any open wounds or new incisions? No  3. Do you have any recent or upcoming hospitalizations, surgeries or dental procedures? No  4. Do you currently have or recently have had any signs of illness or infection or are you on any antibiotics? No  5. Have you had any new, sudden or worsening abdominal pain? No  6. Have you or anyone in your household received a live vaccination in the past 4 weeks? Please note:  No live vaccines while on biologic/chemotherapy until 6 months after the last treatment.  Patient can receive the flu vaccine (shot only) and the pneumovax.  It is optimal for the patient to get these vaccines mid cycle, but they can be given at any time as long as it is not on the day of the infusion. No  7. Have you recently been diagnosed with any new nervous system diseases (ie. Multiple sclerosis,  Guillain [place], seizures, neurological changes) or cancer diagnosis? No  8. Are you on any form of radiation or chemotherapy? No  9. Are you pregnant or breast feeding or do you have plans of pregnancy in the future? No  10. Have you been having any signs of worsening depression or suicidal ideations?  (benlysta only) NA  11. Have there been any other new onset medical symptoms? No      Premedications: were not ordered, patient's first dose    Drug Waste Record: No    Infusion length and rate:  infusion given over approximately 60 minutes at 250mL/hr.    Labs: were drawn per orders, including Hepatitis B antibody/antigen and quantiferon TB    Vascular access: peripheral IV placed today.    Post Infusion Assessment:  Patient tolerated infusion well and without incident. Printed AVS given to patient, reviewed discharge instructions with patient including medication side effects and indications for emergency medical follow-up. Patient verbalized understanding and denied further questions/concerns. Discharged from [facility] in stable condition, will follow-up with GI clinic for home injections.    Discharge Plan:   Follow up plan of care with: ongoing infusions at [facility]., ordering provider as scheduled. and after visit summary given to patient  Discharge instructions were reviewed with patient.  Patient/representative verbalized understanding of discharge instructions and all questions answered.  Patient discharged from [facility] in stable condition.    Tfifany Mcmahon RN       Administrations This Visit     ustekinumab (STELARA) 390 mg in sodium chloride 0.9 % 250 mL infusion     Admin Date  11/24/2020 Action  New Bag Dose  390 mg Rate  250 mL/hr Route  Intravenous Administered By  Tiffany Mcmahon, RN                BP 99/66   Pulse 78   Temp 98.1  F (36.7  C) (Oral)   Resp 16   Wt 57.5 kg (126 lb 12.8 oz)   SpO2 98%   BMI 19.86 kg/m

## 2020-11-25 LAB
HBV CORE AB SERPL QL IA: NONREACTIVE
HBV SURFACE AB SERPL IA-ACNC: 1 M[IU]/ML
HBV SURFACE AG SERPL QL IA: NONREACTIVE

## 2020-11-30 LAB
GAMMA INTERFERON BACKGROUND BLD IA-ACNC: 0.04 IU/ML
M TB IFN-G CD4+ BCKGRND COR BLD-ACNC: 0.76 IU/ML
M TB TUBERC IFN-G BLD QL: NEGATIVE
MITOGEN IGNF BCKGRD COR BLD-ACNC: 0.01 IU/ML
MITOGEN IGNF BCKGRD COR BLD-ACNC: 0.02 IU/ML

## 2020-12-02 ENCOUNTER — VIRTUAL VISIT (OUTPATIENT)
Dept: GASTROENTEROLOGY | Facility: CLINIC | Age: 25
End: 2020-12-02
Payer: COMMERCIAL

## 2020-12-02 VITALS — BODY MASS INDEX: 19.78 KG/M2 | WEIGHT: 126 LBS | HEIGHT: 67 IN

## 2020-12-02 DIAGNOSIS — K51.00 ULCERATIVE PANCOLITIS (H): Primary | ICD-10-CM

## 2020-12-02 PROCEDURE — 99214 OFFICE O/P EST MOD 30 MIN: CPT | Mod: 95 | Performed by: INTERNAL MEDICINE

## 2020-12-02 ASSESSMENT — PAIN SCALES - GENERAL: PAINLEVEL: NO PAIN (0)

## 2020-12-02 ASSESSMENT — MIFFLIN-ST. JEOR: SCORE: 1344.16

## 2020-12-02 NOTE — PATIENT INSTRUCTIONS
PLAN  ---Increase budesonide foam twice daily x 2 weeks, will refill    ---Continue prednisone taper, will refill

## 2020-12-02 NOTE — NURSING NOTE
"Chief Complaint   Patient presents with     Follow Up     follow up       Vitals:    12/02/20 1241   Weight: 57.2 kg (126 lb)   Height: 1.702 m (5' 7\")       Body mass index is 19.73 kg/m .    Mirlande Juares CMA    "

## 2020-12-02 NOTE — LETTER
"  12/2/2020       RE: Xochitl Caputo  [address]  [facility] [address]      Dear Colleague,    Thank you for referring your patient, Xochitl Caputo, to the [facility] [place]. Please see a copy of my visit note below.    Xochitl Caputo is a 25 year old female who is being evaluated via a billable video visit.      The patient has been notified of following:     \"This video visit will be conducted via a call between you and your physician/provider. We have found that certain health care needs can be provided without the need for an in-person physical exam.  This service lets us provide the care you need with a video conversation.  If a prescription is necessary we can send it directly to your pharmacy.  If lab work is needed we can place an order for that and you can then stop by our lab to have the test done at a later time.    Video visits are billed at different rates depending on your insurance coverage.  Please reach out to your insurance provider with any questions.    If during the course of the call the physician/provider feels a video visit is not appropriate, you will not be charged for this service.\"    Patient has given verbal consent for Video visit? Yes  How would you like to obtain your AVS? MyChart  If you are dropped from the video visit, the video invite should be resent to: Text to cell phone: [phone]  Will anyone else be joining your video visit? No          [facility] UC follow up       PATIENT: Xochitl Caputo    MRN: [id number]    Date of Birth 1995    Tel: [phone] (home) NONE (work)    PCP: Jillian Tejada MD     HPI: Ms. Caputo is a 25 year old female here to establish care for UC.     UC history  Was sick before she was diagnosed in 2017. She was seen at [facility] prior to this and was told she had IBS and dehydration.  Hgb was 5.9. Switched care to [facility]. Tried Lialda, imuran, Humira. Transitioned to " "Entyvio     and has been in symptomatic remission from 11/2018-6/2020. Around June, developed a flare in the setting of psychological stress (pandemic, riots, etc). Lost 22 lb, was on 2 courses prednisone (9/17-present). Currently on 20 mg daily.  Had 3 Entyvio infusions on q4w dosing at this point. Had a flex sig 9/16/2020 (below).     Screening appointment for FMT study here at the .   8/2018 established care with [facility] medicine (integrative medicine) which helped. Switched to [facility] about a year ago.     Meds: Entyvio q4w, low dose naltrexone, levothyroxine, spirinolactone (acne)  Supplements: inflamax (powder), fish oil, reversatrol extract, tumeric with black pepper (740 mg), S. Boulardi, VSL #3     Gets acupuncture and recently started LED light therapy (a belt of LED lights)    Macroscopic extent of disease (most recent) E3    Current UC symptoms    Bowel frequency in day 3-4   Bowel frequency in night none  Urgency of defecation YES occasionally, mild   Blood in stool only with wiping   General well being 2 = poor  Extracolonic features (multiple select) none     Constitutional symptoms:  Fever NO  Weight loss YES 22 lb since June     Noteworthy diet history- GFD, very low dairy, avoids eggs, avoid processed foods/sugars. Avoids raw veggies when flaring.     Total number of IBD surgeries (except perianal): 0    Current IBD Medications:  Entyvio q4w  Prednisone 20 mg daily     Past IBD Medications:   Imuran - got PNA, \"didn't feel like myself\"   Humira -- primary nonresponder   Liaonela    Interval history, 11/2020 (video visit)  Has been on prednisone 25 mg daily but can't taper off this further (loose, frequent stools and more blood).   Reports no response to Humira in the past; was on this for 2 months or so. Levels were not checked and did not escalate to EW dosing.     Interval history, 12/2020 (video visit)  Currently on 20 mg prednisone daily. Having some blood, but less than " before.   Continues on budesonide foam, which is helpful (but had a setback in the s/o of menstruation).   Had Stelara infusion on 11/24/2020.   Met with Dr. Jeffries on 11/23 with goals to lower fat and increase soluble fiber, follow IBD AID diet.     Current UC symptoms  Bowel frequency in day loose stools in the morning (2) and in the evening (1) (not in the afternoon)  Bowel frequency in night gas +/- stool  Urgency of defecation YES occasionally, mild   Blood in stool 50%; alternatives between the toilet and only with wiping   General well being 2 = poor  Extracolonic features (multiple select) none     Past Medical History:   Diagnosis Date     Acne      Hypothyroidism      Ulcerative colitis (H)      Ulcerative colitis (H) 10/28/2020        Past Surgical History:   Procedure Laterality Date     EYE SURGERY       ORTHOPEDIC SURGERY      right ankle.       Social History     Tobacco Use     Smoking status: Never Smoker     Smokeless tobacco: Never Used   Substance Use Topics     Alcohol use: Not on file       Family History   Problem Relation Age of Onset     Colitis Maternal Grandfather      Ulcerative Colitis Maternal Grandfather      Crohn's Disease No family hx of      GERD No family hx of      Stomach Cancer No family hx of        No Known Allergies     Outpatient Encounter Medications as of 12/2/2020   Medication Sig Dispense Refill     Black Pepper-Turmeric 5-1000 MG CAPS Take 2,000 mg by mouth daily        Budesonide 2 MG/ACT FOAM Place 2 mg rectally 2 times daily for 14 days, THEN 2 mg daily for 14 days. 4 each 0     calcium carbonate (OS-KATIA) 1500 (600 Ca) MG tablet Take 600 mg by mouth daily        cannabidiol (EPIDIOLEX) 100 MG/ML oral solution Take 30 mg by mouth       clindamycin (CLINDAMAX) 1 % external gel APPLY TO FACE TWICE A DAY FOR ACNE.       Ferrous Sulfate 324 (65 Fe) MG TBEC Take 325 mg by mouth daily as needed (if flaring)       HERBALS Metagenix powder ultra inflam x+360 2 scoops daily    "    levothyroxine (SYNTHROID/LEVOTHROID) 50 MCG tablet Take 50 mcg by mouth       multivitamin w/minerals (THERA-VIT-M) tablet Take 1 tablet by mouth 2 times daily        naltrexone (DEPADE/REVIA) 50 MG tablet Low Dose Naltrexone compounded to 4.5mg. Take 1 capsule daily by mouth at bedtime.       Omega-3 Fatty Acids (OMEGA-3 EPA FISH OIL PO) Take 1,640 mg by mouth daily        omeprazole (PRILOSEC) 40 MG DR capsule Take 1 capsule (40 mg) by mouth daily 90 capsule 3     predniSONE (DELTASONE) 5 MG tablet Take 1 tablet (5 mg) by mouth daily 20 mg daily for 1 week then decrease by one tablet weekly until at 5 mg. Then take 5 every other day for one week then stop. 75 tablet 0     Resveratrol 250 MG CAPS Take 1 capsule by mouth       saccharomyces boulardii (FLORASTOR) 250 MG capsule Take 1 capsule by mouth       spironolactone (ALDACTONE) 50 MG tablet Take 50 mg by mouth       Vedolizumab (ENTYVIO IV) Inject 300 mg into the vein every 28 days        Vitamin D3 (CHOLECALCIFEROL) 125 MCG (5000 UT) tablet Take 1 tablet by mouth every other day       No facility-administered encounter medications on file as of 12/2/2020.       NSAID  none    Review of Systems  Complete 10 System ROS performed. All are negative except as documented below, in the HPI, or in patient questionnaire from today's visit.    1) Constitutional: No fevers, chills, night sweats or malaise, weight loss or gain  2) Skin: No rash  3) Pulmonary: No wheeze, SOB, cough, sputum or hemoptysis  4) Cardiovascular: No Chest pain or palpitations  5) Genitourinary: No blood in urine or dysuria  6) Endocrine: No increased sweating, hunger, thirst or thyroid problems  7) Hematologic: No bruising and easy bleeding  8) Musculoskeletal: no new pain in joints or limitation in ROM  9) Neurologic: No dizziness, paresthesias or weakness or falls  10) Psychiatric:  not depressed/anxious, no sleep problems    PHYSICAL EXAM  Vitals: Ht 1.702 m (5' 7\")   Wt 57.2 kg (126 lb)  "  BMI 19.73 kg/m      No Pain (0)       General appearance  Healthy appearing adult, in no acute distress     Eyes  Sclera anicteric  Pupils round and reactive to light     Ears, nose, mouth and throat  No obvious external lesions of ears and nose  Hearing intact     Neck  Symmetric  No obvious external lesions     Respiratory  Normal respiration, no use of accessory muscles      MSK  Gait normal     Skin  No rashes or jaundice      Psychiatric  Oriented to person, place and time  Appropriate mood and affect.     DATA:  Reviewed in detail past documentation, medications and prior workup available in electronic health records or through outside records.    PERTINENT STUDIES:  C diff neg 8/2020   Fecal calprotectin 6/2020 74   Labs 6/2020 normal.       Most recent CBC:  WBC   Date Value Ref Range Status   11/24/2020 14.6 (H) 4.0 - 11.0 10e9/L Final   ]  Hemoglobin   Date Value Ref Range Status   11/24/2020 11.2 (L) 11.7 - 15.7 g/dL Final   ]   Platelet Count   Date Value Ref Range Status   11/24/2020 408 150 - 450 10e9/L Final     Most recent hepatic panel:  AST   Date Value Ref Range Status   11/24/2020 6 0 - 45 U/L Final     ALT   Date Value Ref Range Status   11/24/2020 14 0 - 50 U/L Final     No results found for: BILICONJ   Bilirubin Total   Date Value Ref Range Status   11/24/2020 0.2 0.2 - 1.3 mg/dL Final     Albumin   Date Value Ref Range Status   11/24/2020 2.9 (L) 3.4 - 5.0 g/dL Final     Alkaline Phosphatase   Date Value Ref Range Status   11/24/2020 51 40 - 150 U/L Final       Most recent creatinine:  Creatinine   Date Value Ref Range Status   11/24/2020 0.66 0.52 - 1.04 mg/dL Final       Endoscopy:     Flex sig 9/2020 showed Tloliver 3 colitis from rectum to descending colon    icscope 9/2019:  Impression:   - The examined portion of the ileum                        was normal.                       - One 10 to 11 mm polyp in the                        sigmoid colon, removed with a hot                         snare. Resected and retrieved.                       - Pancolitis. Inflammation was found                        from the anus to the cecum. This was                        mild in severity and graded as Tolliver                        Score 1 (mild disease). Biopsied.      icscope 8/2017 showed E3 colitis (severity unclear): Congested, erythematous and eroded                        mucosa in the entire examined colon.                        Biopsied from a) right colon and b)                        left colon. Findings consistent with                        ulcerative pan colitis.    Specimens:   A) - Colon, , Right colon bx's                                                                      B) - Colon, , Left colon bx's                                                                       C) - Colon, sigmoid                                                                        FINAL DIAGNOSIS A.  Colon, right, biopsy -- Colonic mucosal fragments with minimal crypt architectural distortion and minimal, focal activity    B.  Colon, left, biopsy -- Colonic mucosal fragments with minimal crypt architectural distortion and minimal, focal activity    C.  Colon, sigmoid, polypectomy -- Inflammatory polyp       IMPRESSION:    Ms. 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 She had been planning to participate in the FMT for UC trial here, but her disease is too active to wait until this trial begins, especially since she steroid-dependent currently. She initiated Stelara on 11/24/2020. Fortunately, we have been able to decrease her prednisone (from 40-->20mg daily).     We will proceed with the following:      DIAGNOSIS:  # Pan UC, Tolliver 3 on Entyvio q4 w, on Stelara initiated 1124/2020 and prednisone   # Heartburn (likely 2/2 prednisone), responsive to PPI therapy    PLAN:  ---Increase budesonide foam twice daily x 2 weeks   ---Continue prednisone taper  ---Met with  Nina Escalona on 11/9/2020 regarding HCM and Stelara. Greatly appreciate this.      Misc:  -- Avoid tobacco use  -- Avoid NSAIDs as there is potentially a 25% chance of causing an IBD flare    Return in about 2 months (around 2/2/2021).    Poonam Dunbar MD   of Medicine  Division of Gastroenterology, Hepatology and Nutrition  [facility]      Video-Visit Details    Type of service:  Video Visit    Video Start Time: 1:17 PM  Video End Time: 1:57 PM    Originating Location (pt. Location): Home    Distant Location (provider location):  [facility] [place]     Platform used for Video Visit: [employer]

## 2020-12-02 NOTE — PROGRESS NOTES
"Xochitl Caputo is a 25 year old female who is being evaluated via a billable video visit.      The patient has been notified of following:     \"This video visit will be conducted via a call between you and your physician/provider. We have found that certain health care needs can be provided without the need for an in-person physical exam.  This service lets us provide the care you need with a video conversation.  If a prescription is necessary we can send it directly to your pharmacy.  If lab work is needed we can place an order for that and you can then stop by our lab to have the test done at a later time.    Video visits are billed at different rates depending on your insurance coverage.  Please reach out to your insurance provider with any questions.    If during the course of the call the physician/provider feels a video visit is not appropriate, you will not be charged for this service.\"    Patient has given verbal consent for Video visit? Yes  How would you like to obtain your AVS? MyChart  If you are dropped from the video visit, the video invite should be resent to: Text to cell phone: [phone]  Will anyone else be joining your video visit? No        [facility] UC follow up       PATIENT: Xochitl Caputo    MRN: [id number]    Date of Birth 1995    Tel: [phone] (home) NONE (work)    PCP: Jillian Tejada MD     HPI: Ms. Caputo is a 25 year old female here to establish care for UC.     UC history  Was sick before she was diagnosed in 2017. She was seen at [facility] prior to this and was told she had IBS and dehydration.  Hgb was 5.9. Switched care to [facility]. Tried Lialda, imuran, Humira. Transitioned to Entyvio     and has been in symptomatic remission from 11/2018-6/2020. Around June, developed a flare in the setting of psychological stress (pandemic, riots, etc). Lost 22 lb, was on 2 courses prednisone (9/17-present). Currently on 20 mg daily.  Had 3 Entyvio infusions on q4w " "dosing at this point. Had a flex sig 9/16/2020 (below).     Screening appointment for FMT study here at the .   8/2018 established care with [facility] medicine (integrative medicine) which helped. Switched to [facility] about a year ago.     Meds: Entyvio q4w, low dose naltrexone, levothyroxine, spirinolactone (acne)  Supplements: inflamax (powder), fish oil, reversatrol extract, tumeric with black pepper (740 mg), HENRY Glasgow, VSL #3     Gets acupuncture and recently started LED light therapy (a belt of LED lights)    Macroscopic extent of disease (most recent) E3    Current UC symptoms    Bowel frequency in day 3-4   Bowel frequency in night none  Urgency of defecation YES occasionally, mild   Blood in stool only with wiping   General well being 2 = poor  Extracolonic features (multiple select) none     Constitutional symptoms:  Fever NO  Weight loss YES 22 lb since June     Noteworthy diet history- GFD, very low dairy, avoids eggs, avoid processed foods/sugars. Avoids raw veggies when flaring.     Total number of IBD surgeries (except perianal): 0    Current IBD Medications:  Entyvio q4w  Prednisone 20 mg daily     Past IBD Medications:   Imuran - got PNA, \"didn't feel like myself\"   Humira -- primary nonresponder   Lialda    Interval history, 11/2020 (video visit)  Has been on prednisone 25 mg daily but can't taper off this further (loose, frequent stools and more blood).   Reports no response to Humira in the past; was on this for 2 months or so. Levels were not checked and did not escalate to EW dosing.     Interval history, 12/2020 (video visit)  Currently on 20 mg prednisone daily. Having some blood, but less than before.   Continues on budesonide foam, which is helpful (but had a setback in the s/o of menstruation).   Had Stelara infusion on 11/24/2020.   Met with Dr. Jeffries on 11/23 with goals to lower fat and increase soluble fiber, follow IBD AID diet.     Current UC symptoms  Bowel " frequency in day loose stools in the morning (2) and in the evening (1) (not in the afternoon)  Bowel frequency in night gas +/- stool  Urgency of defecation YES occasionally, mild   Blood in stool 50%; alternatives between the toilet and only with wiping   General well being 2 = poor  Extracolonic features (multiple select) none     Past Medical History:   Diagnosis Date     Acne      Hypothyroidism      Ulcerative colitis (H)      Ulcerative colitis (H) 10/28/2020        Past Surgical History:   Procedure Laterality Date     EYE SURGERY       ORTHOPEDIC SURGERY      right ankle.       Social History     Tobacco Use     Smoking status: Never Smoker     Smokeless tobacco: Never Used   Substance Use Topics     Alcohol use: Not on file       Family History   Problem Relation Age of Onset     Colitis Maternal Grandfather      Ulcerative Colitis Maternal Grandfather      Crohn's Disease No family hx of      GERD No family hx of      Stomach Cancer No family hx of        No Known Allergies     Outpatient Encounter Medications as of 12/2/2020   Medication Sig Dispense Refill     Black Pepper-Turmeric 5-1000 MG CAPS Take 2,000 mg by mouth daily        Budesonide 2 MG/ACT FOAM Place 2 mg rectally 2 times daily for 14 days, THEN 2 mg daily for 14 days. 4 each 0     calcium carbonate (OS-KATIA) 1500 (600 Ca) MG tablet Take 600 mg by mouth daily        cannabidiol (EPIDIOLEX) 100 MG/ML oral solution Take 30 mg by mouth       clindamycin (CLINDAMAX) 1 % external gel APPLY TO FACE TWICE A DAY FOR ACNE.       Ferrous Sulfate 324 (65 Fe) MG TBEC Take 325 mg by mouth daily as needed (if flaring)       HERBALS Metagenix powder ultra inflam x+360 2 scoops daily       levothyroxine (SYNTHROID/LEVOTHROID) 50 MCG tablet Take 50 mcg by mouth       multivitamin w/minerals (THERA-VIT-M) tablet Take 1 tablet by mouth 2 times daily        naltrexone (DEPADE/REVIA) 50 MG tablet Low Dose Naltrexone compounded to 4.5mg. Take 1 capsule daily by  "mouth at bedtime.       Omega-3 Fatty Acids (OMEGA-3 EPA FISH OIL PO) Take 1,640 mg by mouth daily        omeprazole (PRILOSEC) 40 MG DR capsule Take 1 capsule (40 mg) by mouth daily 90 capsule 3     predniSONE (DELTASONE) 5 MG tablet Take 1 tablet (5 mg) by mouth daily 20 mg daily for 1 week then decrease by one tablet weekly until at 5 mg. Then take 5 every other day for one week then stop. 75 tablet 0     Resveratrol 250 MG CAPS Take 1 capsule by mouth       saccharomyces boulardii (FLORASTOR) 250 MG capsule Take 1 capsule by mouth       spironolactone (ALDACTONE) 50 MG tablet Take 50 mg by mouth       Vedolizumab (ENTYVIO IV) Inject 300 mg into the vein every 28 days        Vitamin D3 (CHOLECALCIFEROL) 125 MCG (5000 UT) tablet Take 1 tablet by mouth every other day       No facility-administered encounter medications on file as of 12/2/2020.       NSAID  none    Review of Systems  Complete 10 System ROS performed. All are negative except as documented below, in the HPI, or in patient questionnaire from today's visit.    1) Constitutional: No fevers, chills, night sweats or malaise, weight loss or gain  2) Skin: No rash  3) Pulmonary: No wheeze, SOB, cough, sputum or hemoptysis  4) Cardiovascular: No Chest pain or palpitations  5) Genitourinary: No blood in urine or dysuria  6) Endocrine: No increased sweating, hunger, thirst or thyroid problems  7) Hematologic: No bruising and easy bleeding  8) Musculoskeletal: no new pain in joints or limitation in ROM  9) Neurologic: No dizziness, paresthesias or weakness or falls  10) Psychiatric:  not depressed/anxious, no sleep problems    PHYSICAL EXAM  Vitals: Ht 1.702 m (5' 7\")   Wt 57.2 kg (126 lb)   BMI 19.73 kg/m      No Pain (0)       General appearance  Healthy appearing adult, in no acute distress     Eyes  Sclera anicteric  Pupils round and reactive to light     Ears, nose, mouth and throat  No obvious external lesions of ears and nose  Hearing " intact     Neck  Symmetric  No obvious external lesions     Respiratory  Normal respiration, no use of accessory muscles      MSK  Gait normal     Skin  No rashes or jaundice      Psychiatric  Oriented to person, place and time  Appropriate mood and affect.     DATA:  Reviewed in detail past documentation, medications and prior workup available in electronic health records or through outside records.    PERTINENT STUDIES:  C diff neg 8/2020   Fecal calprotectin 6/2020 74   Labs 6/2020 normal.       Most recent CBC:  WBC   Date Value Ref Range Status   11/24/2020 14.6 (H) 4.0 - 11.0 10e9/L Final   ]  Hemoglobin   Date Value Ref Range Status   11/24/2020 11.2 (L) 11.7 - 15.7 g/dL Final   ]   Platelet Count   Date Value Ref Range Status   11/24/2020 408 150 - 450 10e9/L Final     Most recent hepatic panel:  AST   Date Value Ref Range Status   11/24/2020 6 0 - 45 U/L Final     ALT   Date Value Ref Range Status   11/24/2020 14 0 - 50 U/L Final     No results found for: BILICONJ   Bilirubin Total   Date Value Ref Range Status   11/24/2020 0.2 0.2 - 1.3 mg/dL Final     Albumin   Date Value Ref Range Status   11/24/2020 2.9 (L) 3.4 - 5.0 g/dL Final     Alkaline Phosphatase   Date Value Ref Range Status   11/24/2020 51 40 - 150 U/L Final       Most recent creatinine:  Creatinine   Date Value Ref Range Status   11/24/2020 0.66 0.52 - 1.04 mg/dL Final       Endoscopy:     Flex sig 9/2020 showed Tolliver 3 colitis from rectum to descending colon    icscope 9/2019:  Impression:   - The examined portion of the ileum                        was normal.                       - One 10 to 11 mm polyp in the                        sigmoid colon, removed with a hot                        snare. Resected and retrieved.                       - Pancolitis. Inflammation was found                        from the anus to the cecum. This was                        mild in severity and graded as Tolliver                        Score 1 (mild disease).  Biopsied.      icscope 8/2017 showed E3 colitis (severity unclear): Congested, erythematous and eroded                        mucosa in the entire examined colon.                        Biopsied from a) right colon and b)                        left colon. Findings consistent with                        ulcerative pan colitis.    Specimens:   A) - Colon, , Right colon bx's                                                                      B) - Colon, , Left colon bx's                                                                       C) - Colon, sigmoid                                                                        FINAL DIAGNOSIS A.  Colon, right, biopsy -- Colonic mucosal fragments with minimal crypt architectural distortion and minimal, focal activity    B.  Colon, left, biopsy -- Colonic mucosal fragments with minimal crypt architectural distortion and minimal, focal activity    C.  Colon, sigmoid, polypectomy -- Inflammatory polyp       IMPRESSION:    Ms. 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 She had been planning to participate in the FMT for UC trial here, but her disease is too active to wait until this trial begins, especially since she steroid-dependent currently. She initiated Stelara on 11/24/2020. Fortunately, we have been able to decrease her prednisone (from 40-->20mg daily).     We will proceed with the following:      DIAGNOSIS:  # Pan UC, Tolliver 3 on Entyvio q4 w, on Stelara initiated 1124/2020 and prednisone   # Heartburn (likely 2/2 prednisone), responsive to PPI therapy    PLAN:  ---Increase budesonide foam twice daily x 2 weeks   ---Continue prednisone taper  ---Met with Nina Escalona on 11/9/2020 regarding HCM and Stelara. Greatly appreciate this.      Misc:  -- Avoid tobacco use  -- Avoid NSAIDs as there is potentially a 25% chance of causing an IBD flare    Return in about 2 months (around 2/2/2021).    Poonam Dunbar,  MD   of Medicine  Division of Gastroenterology, Hepatology and Nutrition  [facility]      Video-Visit Details    Type of service:  Video Visit    Video Start Time: 1:17 PM  Video End Time: 1:57 PM    Originating Location (pt. Location): Home    Distant Location (provider location):  [facility] [place]     Platform used for Video Visit: Metafused

## 2020-12-07 DIAGNOSIS — K51.90 ULCERATIVE COLITIS (H): ICD-10-CM

## 2020-12-07 RX ORDER — USTEKINUMAB 90 MG/ML
INJECTION, SOLUTION SUBCUTANEOUS
Qty: 1 SYRINGE | Refills: 5 | Status: SHIPPED | OUTPATIENT
Start: 2020-12-07 | End: 2021-12-17 | Stop reason: ALTCHOICE

## 2020-12-07 RX ORDER — PREDNISONE 5 MG/1
TABLET ORAL
Qty: 75 TABLET | Refills: 0 | Status: SHIPPED | OUTPATIENT
Start: 2020-12-07 | End: 2020-12-29

## 2020-12-07 RX ORDER — BUDESONIDE 28 MG/1
AEROSOL, FOAM RECTAL
Qty: 4 EACH | Refills: 0 | Status: SHIPPED | OUTPATIENT
Start: 2020-12-07 | End: 2020-12-29

## 2020-12-07 NOTE — PROGRESS NOTES
Per Dr Dunbar ordered patient prednisone and the rectal budesonide.     Scheduled patient for the first injectable dose of stelara on 1/20 at 2 pm with me in clinic    Ordered patient stelara sub-q stelara dosing.

## 2020-12-15 ENCOUNTER — CLINICAL UPDATE (OUTPATIENT)
Dept: PHARMACY | Facility: CLINIC | Age: 25
End: 2020-12-15
Payer: COMMERCIAL

## 2020-12-15 DIAGNOSIS — K51.911 ULCERATIVE COLITIS WITH RECTAL BLEEDING, UNSPECIFIED LOCATION (H): Primary | ICD-10-CM

## 2020-12-15 PROCEDURE — 99207 PR NO CHARGE LOS: CPT | Performed by: PHARMACIST

## 2020-12-15 NOTE — PROGRESS NOTES
Clinical Pharmacy Consult:                                                    Xochitl Caputo is a 25 year old female called for a clinical pharmacist consult.  She was referred to me from self.     Xochitl is not seen for CMR today due to coverage/request for consult.    Reason for Consult: questions about COVID-19 vaccine     Discussion:     Provided overview of the currently available COVID-19 vaccine and statement below which reflects our current clinic stance on the topic. She inquires what the process will be when the vaccine becomes available to our clinic. She does not anticipate getting it through work at this time ([employer]). Discussed that we do not have any planned process at this time as we are still in phase one, but she is welcome to contact us again. It is possible that we may provide some sort of broad clinic communication if it does become available to us, but again, we are in the very early planning phases and do not have access to the vaccine at this time.    She also inquires in general about what to do if she does get COVID. She remembers Alexandra told her to contact clinic right away. Reviewed general process and encouraged her to reach out if she does get diagnosed with COVID-19 at any point as we will need to discuss holding of biologics, testing, etc. Reviewed symptoms that may warrant more immediate medical attention.     No further questions at the end of our call today. In our [employer] exchange, she had initially wanted Dr. Dunbar's input on the vaccine as well but is okay with deferring that request at this time given the information discussed today.    Plan:  1. Xochitl to reach out with further questions if needed      Currently, there is limited data to make recommendations regarding the COVID-19 vaccine. Unfortunately, people with conditions that cause low-immunity and people on medications that suppress their immune system were not included in the currently published clinical trials ([place] Journal of Medicine, December 2020). We are therefore using the best information we have to make the safest decision. We understand there are also individual circumstances that may not fall into a single recommendation.     - The COVID-19 vaccine in the [place] is NOT a live virus vaccine  - There is no risk of COVID infection with this vaccine  - There may be unknown risks as this is a new type of vaccine (no other current vaccines are like this)  - There may still be immune related side effects as this vaccine does stimulate the immune system (for example: fevers, sore throat, runny nose, pain at injection site)  - The vaccine may be less effective in people with IBD, especially if on medications that affect the immune system. On the other hand, this vaccine may be better than other vaccines (such as flu) because it can elicit a stronger immune response.     New immune reactions are a potential risk for all people, including people with inflammatory bowel disease. In the short term (2-3 months) this has not been seen in the general population.  It is important to know this is a potential risk, and not yet an observed risk.     At this point, given the overall risk of COVID and likely safety of the COVID vaccine, patients with inflammatory bowel disease should get vaccinated when available. We also think people on medications that affect the immune system should get vaccinated. If you are concerned, then please (1) talk to your IBD provider and (2) make sure that everyone in your household does get the vaccine. Vaccinating the people around you will also help to protect you.     Finally, even after getting the vaccine it is still essential to wear a mask, wash your hands frequently, and practice social distancing. We will need to maintain these basic safety precautions until all people are vaccinated.     [facility]

## 2020-12-26 DIAGNOSIS — K51.90 ULCERATIVE COLITIS (H): ICD-10-CM

## 2020-12-26 RX ORDER — PREDNISONE 5 MG/1
TABLET ORAL
Qty: 75 TABLET | Refills: 0 | Status: CANCELLED | OUTPATIENT
Start: 2020-12-26

## 2020-12-29 ENCOUNTER — PATIENT OUTREACH (OUTPATIENT)
Dept: GASTROENTEROLOGY | Facility: CLINIC | Age: 25
End: 2020-12-29

## 2020-12-29 DIAGNOSIS — K51.90 ULCERATIVE COLITIS (H): ICD-10-CM

## 2020-12-29 RX ORDER — PREDNISONE 5 MG/1
TABLET ORAL
Qty: 75 TABLET | Refills: 0 | Status: SHIPPED | OUTPATIENT
Start: 2020-12-29 | End: 2021-01-20

## 2020-12-29 RX ORDER — BUDESONIDE 28 MG/1
AEROSOL, FOAM RECTAL
Qty: 4 EACH | Refills: 0 | Status: SHIPPED | OUTPATIENT
Start: 2020-12-29 | End: 2021-01-20

## 2020-12-29 NOTE — PROGRESS NOTES
Patient is feeling much better since returning from [place].   she is starting to decrease  Her prednisone and needs a refill.  she is now at 15 mg and tolerating well she continues the twice daily budesonide foam

## 2021-01-04 ENCOUNTER — TELEPHONE (OUTPATIENT)
Dept: GASTROENTEROLOGY | Facility: CLINIC | Age: 26
End: 2021-01-04

## 2021-01-04 NOTE — TELEPHONE ENCOUNTER
Prior Authorization Approval    Authorization Effective Date: 1/4/2021  Authorization Expiration Date: 1/4/2022  Medication: STELARA - Approved   Approved Dose/Quantity:  1 for 28 days   Reference #:     Insurance Company: [employer] - Phone [phone] Fax [phone]  Expected CoPay:       CoPay Card Available:      Foundation Assistance Needed:    Which Pharmacy is filling the prescription (Not needed for infusion[facility]clinic [facility] [address]  Pharmacy Notified: Yes  Patient Notified: Yes

## 2021-01-04 NOTE — TELEPHONE ENCOUNTER
PA Initiation    Medication: STELARA   Insurance Company: [employer] - Phone [phone] Fax [phone]  [facility] [address]  Filling Pharmacy Phone:    Filling Pharmacy Fax:    Start Date: 1/4/2021    URIEL DUFFY (Yu: [id number])

## 2021-01-06 ENCOUNTER — TELEPHONE (OUTPATIENT)
Dept: GASTROENTEROLOGY | Facility: CLINIC | Age: 26
End: 2021-01-06

## 2021-01-06 NOTE — TELEPHONE ENCOUNTER
[facility] Call Center    Phone Message    May a detailed message be left on voicemail: yes     Reason for Call: Other: Per pt know that she has a appt on 1/20 with a GI Nurse as a in-person appointment. Pt wants to know if she needs a COVID test before hand. If not she doesn't want a call back but if she does she would like a call back.     Action Taken: Message routed to:  [facility] ([facility] Gastro    Travel Screening: Not Applicable

## 2021-01-15 ENCOUNTER — HEALTH MAINTENANCE LETTER (OUTPATIENT)
Age: 26
End: 2021-01-15

## 2021-01-20 ENCOUNTER — ALLIED HEALTH/NURSE VISIT (OUTPATIENT)
Dept: GASTROENTEROLOGY | Facility: CLINIC | Age: 26
End: 2021-01-20
Payer: COMMERCIAL

## 2021-01-20 DIAGNOSIS — K51.90 ULCERATIVE COLITIS (H): ICD-10-CM

## 2021-01-20 PROCEDURE — 99207 PR NO BILLABLE SERVICE THIS VISIT: CPT

## 2021-01-20 RX ORDER — BUDESONIDE 28 MG/1
AEROSOL, FOAM RECTAL
Qty: 84 MG | Refills: 0 | Status: SHIPPED | OUTPATIENT
Start: 2021-01-20 | End: 2021-02-04

## 2021-01-20 RX ORDER — PREDNISONE 5 MG/1
TABLET ORAL
Qty: 90 TABLET | Refills: 0 | Status: SHIPPED | OUTPATIENT
Start: 2021-01-20 | End: 2021-02-12

## 2021-01-20 NOTE — PROGRESS NOTES
Teaching for stelara given.        The following medication was given:     MEDICATION: stelara injection (patient brought in)   ROUTE: subcutaneous    SITE: right lower abdomen    DOSE: 90 mg   LOT #:       : Steve   EXPIRATION DATE:  06/2024  NDC: 25113-674-55

## 2021-01-25 ENCOUNTER — PATIENT OUTREACH (OUTPATIENT)
Dept: GASTROENTEROLOGY | Facility: CLINIC | Age: 26
End: 2021-01-25

## 2021-01-25 DIAGNOSIS — R11.10 VOMITING AND DIARRHEA: Primary | ICD-10-CM

## 2021-01-25 DIAGNOSIS — R19.7 VOMITING AND DIARRHEA: Primary | ICD-10-CM

## 2021-01-25 RX ORDER — ONDANSETRON 4 MG/1
4 TABLET, ORALLY DISINTEGRATING ORAL EVERY 8 HOURS PRN
Qty: 10 TABLET | Refills: 0 | Status: SHIPPED | OUTPATIENT
Start: 2021-01-25 | End: 2021-01-27

## 2021-01-25 NOTE — PROGRESS NOTES
"Patient called she has been feeling Terrible for the last twelve hours throwing  Up everything says \"it was the worst night of her life\" she has constantl nausea  And frequent diarrhea. The patient did eat out at [facility] [employer] and had chicken, she is wondering if she has food poisoning .  She is feeling dehydrated and  hss the chills, no fever      Spoke to dr Dunbar will have patent submit stool samples and will fill prescription for Zofran.   "

## 2021-01-27 DIAGNOSIS — R19.7 VOMITING AND DIARRHEA: ICD-10-CM

## 2021-01-27 DIAGNOSIS — R11.10 VOMITING AND DIARRHEA: ICD-10-CM

## 2021-01-27 PROCEDURE — 87329 GIARDIA AG IA: CPT | Performed by: INTERNAL MEDICINE

## 2021-01-27 PROCEDURE — 87328 CRYPTOSPORIDIUM AG IA: CPT | Performed by: INTERNAL MEDICINE

## 2021-01-27 PROCEDURE — 87506 IADNA-DNA/RNA PROBE TQ 6-11: CPT | Performed by: INTERNAL MEDICINE

## 2021-01-27 PROCEDURE — 36415 COLL VENOUS BLD VENIPUNCTURE: CPT | Performed by: INTERNAL MEDICINE

## 2021-01-27 PROCEDURE — 87177 OVA AND PARASITES SMEARS: CPT | Performed by: INTERNAL MEDICINE

## 2021-01-27 PROCEDURE — 83993 ASSAY FOR CALPROTECTIN FECAL: CPT | Performed by: INTERNAL MEDICINE

## 2021-01-27 PROCEDURE — 87209 SMEAR COMPLEX STAIN: CPT | Performed by: INTERNAL MEDICINE

## 2021-01-27 PROCEDURE — 87493 C DIFF AMPLIFIED PROBE: CPT | Mod: XU | Performed by: INTERNAL MEDICINE

## 2021-01-27 RX ORDER — ONDANSETRON 4 MG/1
4 TABLET, ORALLY DISINTEGRATING ORAL EVERY 8 HOURS PRN
Qty: 10 TABLET | Refills: 0 | Status: SHIPPED | OUTPATIENT
Start: 2021-01-27 | End: 2021-08-18

## 2021-01-28 LAB
C COLI+JEJUNI+LARI FUSA STL QL NAA+PROBE: NOT DETECTED
C DIFF TOX B STL QL: NEGATIVE
EC STX1 GENE STL QL NAA+PROBE: NOT DETECTED
EC STX2 GENE STL QL NAA+PROBE: NOT DETECTED
ENTERIC PATHOGEN COMMENT: NORMAL
NOROV GI+II ORF1-ORF2 JNC STL QL NAA+PR: NOT DETECTED
RVA NSP5 STL QL NAA+PROBE: NOT DETECTED
SALMONELLA SP RPOD STL QL NAA+PROBE: NOT DETECTED
SHIGELLA SP+EIEC IPAH STL QL NAA+PROBE: NOT DETECTED
SPECIMEN SOURCE: NORMAL
V CHOL+PARA RFBL+TRKH+TNAA STL QL NAA+PR: NOT DETECTED
Y ENTERO RECN STL QL NAA+PROBE: NOT DETECTED

## 2021-01-29 LAB
C PARVUM AG STL QL IA: NEGATIVE
CALPROTECTIN STL-MCNT: 3820 MG/KG (ref 0–49.9)
G LAMBLIA AG STL QL IA: NEGATIVE
O+P STL MICRO: NORMAL
SPECIMEN SOURCE: NORMAL
SPECIMEN SOURCE: NORMAL

## 2021-02-01 DIAGNOSIS — K51.90 ULCERATIVE COLITIS (H): Primary | ICD-10-CM

## 2021-02-01 NOTE — PROGRESS NOTES
Patient called and due to symptoms that she was having over the weekend.  She states that after last week and her stomach infection that she had she feels that now her UC has started to act up.  She states that on Saturday evening into Sunday she started to see blood in her stools and having lower abdominal pain and cramping.  She states that the pain in her abdomen is mild.    Patient increase her prednisone dosing to 40 mg.  She does feel that she may be a little bit dehydrated from the vomiting from last week and now the increased stool output. blood work blood work has been ordered for her.    Patient also states that she has had increased anxiety due to the pandemic and her ulcerative colitis.  Patient is seeing her PCP today to talk about medication she can start to help with her anxiety.  A referral will also be placed for her to meet with Dr. Pau Rubio to discuss her concerns related to anxiety and her ulcerative colitis.

## 2021-02-03 ENCOUNTER — MYC MEDICAL ADVICE (OUTPATIENT)
Dept: GASTROENTEROLOGY | Facility: CLINIC | Age: 26
End: 2021-02-03

## 2021-02-04 DIAGNOSIS — K51.90 ULCERATIVE COLITIS (H): ICD-10-CM

## 2021-02-04 RX ORDER — BUDESONIDE 28 MG/1
AEROSOL, FOAM RECTAL
Qty: 84 MG | Refills: 0 | Status: SHIPPED | OUTPATIENT
Start: 2021-02-04 | End: 2021-03-04

## 2021-02-08 NOTE — TELEPHONE ENCOUNTER
Spoke to the patient last week on 2/4/2021 sent the patient's medication to the pharmacy in [place]

## 2021-02-11 DIAGNOSIS — K51.90 ULCERATIVE COLITIS (H): ICD-10-CM

## 2021-02-11 RX ORDER — PREDNISONE 5 MG/1
TABLET ORAL
Qty: 90 TABLET | Refills: 0 | Status: CANCELLED | OUTPATIENT
Start: 2021-02-11

## 2021-02-12 ENCOUNTER — PATIENT OUTREACH (OUTPATIENT)
Dept: GASTROENTEROLOGY | Facility: CLINIC | Age: 26
End: 2021-02-12

## 2021-02-12 DIAGNOSIS — K51.90 ULCERATIVE COLITIS (H): ICD-10-CM

## 2021-02-12 RX ORDER — PREDNISONE 5 MG/1
TABLET ORAL
Qty: 90 TABLET | Refills: 0 | Status: SHIPPED | OUTPATIENT
Start: 2021-02-12 | End: 2021-03-26

## 2021-02-12 NOTE — PROGRESS NOTES
Sent patient script for prednisone to the pharmacy for  her.  She has decreased to 15 mg daily.

## 2021-02-15 RX ORDER — BUDESONIDE 28 MG/1
AEROSOL, FOAM RECTAL
Qty: 84 MG | Refills: 0 | Status: CANCELLED | OUTPATIENT
Start: 2021-02-15 | End: 2021-03-15

## 2021-02-23 ENCOUNTER — VIRTUAL VISIT (OUTPATIENT)
Dept: GASTROENTEROLOGY | Facility: CLINIC | Age: 26
End: 2021-02-23
Payer: COMMERCIAL

## 2021-02-23 ENCOUNTER — PATIENT OUTREACH (OUTPATIENT)
Dept: GASTROENTEROLOGY | Facility: CLINIC | Age: 26
End: 2021-02-23

## 2021-02-23 VITALS — HEIGHT: 67 IN | WEIGHT: 118 LBS | BODY MASS INDEX: 18.52 KG/M2

## 2021-02-23 DIAGNOSIS — K51.00 ULCERATIVE PANCOLITIS (H): Primary | ICD-10-CM

## 2021-02-23 PROCEDURE — 99214 OFFICE O/P EST MOD 30 MIN: CPT | Mod: 95 | Performed by: INTERNAL MEDICINE

## 2021-02-23 ASSESSMENT — PAIN SCALES - GENERAL: PAINLEVEL: NO PAIN (0)

## 2021-02-23 ASSESSMENT — MIFFLIN-ST. JEOR: SCORE: 1307.87

## 2021-02-23 NOTE — LETTER
"  2/23/2021       RE: Xochitl Caputo  [address]  [facility] [address]      Dear Colleague,    Thank you for referring your patient, Xochitl Caputo, to the [facility] [place]. Please see a copy of my visit note below.    Xochitl Caputo is a 25 year old female who is being evaluated via a billable video visit.      The patient has been notified of following:     \"This video visit will be conducted via a call between you and your physician/provider. We have found that certain health care needs can be provided without the need for an in-person physical exam.  This service lets us provide the care you need with a video conversation.  If a prescription is necessary we can send it directly to your pharmacy.  If lab work is needed we can place an order for that and you can then stop by our lab to have the test done at a later time.    If during the course of the call the physician/provider feels a video visit is not appropriate, you will not be charged for this service.\"     Patient confirmed that they are in [place] for today's visit yes    If the video visit is dropped, please send link to:   Text to cell phone: [phone]       [facility] UC follow up     PATIENT: Xochitl Caputo    MRN: [id number]    Date of Birth 1995    Tel: [phone] (home) NONE (work)    PCP: Jillian Tejada MD     HPI: Ms. Caputo is a 25 year old female here to establish care for UC.     UC history  Was sick before she was diagnosed in 2017. She was seen at [facility] prior to this and was told she had IBS and dehydration.  Hgb was 5.9. Switched care to [facility]. Tried Lialda, imuran, Humira. Transitioned to Entyvio     and has been in symptomatic remission from 11/2018-6/2020. Around June, developed a flare in the setting of psychological stress (pandemic, riots, etc). Lost 22 lb, was on 2 courses prednisone (9/17-present). Currently on 20 mg daily.  Had 3 Entyvio " "infusions on q4w dosing at this point. Had a flex sig 9/16/2020 (below).     Screening appointment for FMT study here at the .   8/2018 established care with [facility] medicine (integrative medicine) which helped. Switched to [facility] about a year ago.     Meds: Entyvio q4w, low dose naltrexone, levothyroxine, spirinolactone (acne)  Supplements: inflamax (powder), fish oil, reversatrol extract, tumeric with black pepper (740 mg), HENRY Fontenotmargot, VSL #3     Gets acupuncture and recently started LED light therapy (a belt of LED lights)    Macroscopic extent of disease (most recent) E3    Current UC symptoms    Bowel frequency in day 3-4   Bowel frequency in night none  Urgency of defecation YES occasionally, mild   Blood in stool only with wiping   General well being 2 = poor  Extracolonic features (multiple select) none     Constitutional symptoms:  Fever NO  Weight loss YES 22 lb since June     Noteworthy diet history- GFD, very low dairy, avoids eggs, avoid processed foods/sugars. Avoids raw veggies when flaring.     Total number of IBD surgeries (except perianal): 0    Current IBD Medications:  Entyvio q4w  Prednisone 20 mg daily     Past IBD Medications:   Imuran - got PNA, \"didn't feel like myself\"   Humira -- primary nonresponder   Renaa    Interval history, 11/2020 (video visit)  Has been on prednisone 25 mg daily but can't taper off this further (loose, frequent stools and more blood).   Reports no response to Humira in the past; was on this for 2 months or so. Levels were not checked and did not escalate to EW dosing.     Interval history, 12/2020 (video visit)  Currently on 20 mg prednisone daily. Having some blood, but less than before.   Continues on budesonide foam, which is helpful (but had a setback in the s/o of menstruation).   Had Stelara infusion on 11/24/2020.   Met with Dr. Jeffries on 11/23 with goals to lower fat and increase soluble fiber, follow IBD AID diet.     Current UC " symptoms  Bowel frequency in day loose stools in the morning (2) and in the evening (1) (not in the afternoon)  Bowel frequency in night gas +/- stool  Urgency of defecation YES occasionally, mild   Blood in stool 50%; alternatives between the toilet and only with wiping   General well being 2 = poor  Extracolonic features (multiple select) none     Interval history, 1/2021 (video visit)  Off prednisone x 3 days. Continues on budesonide foam x .   Has been experiencing constipation and a mildly painful hemorrhoid.  Last Stelara injection was 1/20/2021    Current UC symptoms  Bowel frequency in day 3 (except 5/day with menstruation)  Bowel frequency in night none  Urgency of defecation No  Blood in stool none    General well being 0 = very well  Extracolonic features (multiple select) none      Past Medical History:   Diagnosis Date     Acne      Hypothyroidism      Ulcerative colitis (H)      Ulcerative colitis (H) 10/28/2020        Past Surgical History:   Procedure Laterality Date     EYE SURGERY       ORTHOPEDIC SURGERY      right ankle.       Social History     Tobacco Use     Smoking status: Never Smoker     Smokeless tobacco: Never Used   Substance Use Topics     Alcohol use: Not on file       Family History   Problem Relation Age of Onset     Colitis Maternal Grandfather      Ulcerative Colitis Maternal Grandfather      Crohn's Disease No family hx of      GERD No family hx of      Stomach Cancer No family hx of        No Known Allergies     Outpatient Encounter Medications as of 2/23/2021   Medication Sig Dispense Refill     Black Pepper-Turmeric 5-1000 MG CAPS Take 2,000 mg by mouth daily        calcium carbonate (OS-KATIA) 1500 (600 Ca) MG tablet Take 600 mg by mouth daily        cannabidiol (EPIDIOLEX) 100 MG/ML oral solution Take 30 mg by mouth       clindamycin (CLINDAMAX) 1 % external gel APPLY TO FACE TWICE A DAY FOR ACNE.       Ferrous Sulfate 324 (65 Fe) MG TBEC Take 325 mg by mouth daily as needed  (if flaring)       HERBALS Metagenix powder ultra inflam x+360 2 scoops daily       levothyroxine (SYNTHROID/LEVOTHROID) 50 MCG tablet Take 50 mcg by mouth       multivitamin w/minerals (THERA-VIT-M) tablet Take 1 tablet by mouth 2 times daily        naltrexone (DEPADE/REVIA) 50 MG tablet Low Dose Naltrexone compounded to 4.5mg. Take 1 capsule daily by mouth at bedtime.       Omega-3 Fatty Acids (OMEGA-3 EPA FISH OIL PO) Take 1,640 mg by mouth daily        omeprazole (PRILOSEC) 40 MG DR capsule Take 1 capsule (40 mg) by mouth daily 90 capsule 3     Probiotic Product (VISBIOME PO)        Resveratrol 250 MG CAPS Take 1 capsule by mouth       saccharomyces boulardii (FLORASTOR) 250 MG capsule Take 1 capsule by mouth       sertraline (ZOLOFT) 50 MG tablet        spironolactone (ALDACTONE) 50 MG tablet Take 50 mg by mouth       ustekinumab (STELARA) 90 MG/ML Inject 1 ml ( 90 mg) subcutaneous every 8 weeks.  First injection on 01/20/2021 1 Syringe 5     Vitamin D3 (CHOLECALCIFEROL) 125 MCG (5000 UT) tablet Take 1 tablet by mouth every other day       Budesonide 2 MG/ACT FOAM Place 2 mg rectally 2 times daily for 14 days, THEN 2 mg daily for 14 days. (Patient not taking: Reported on 2/23/2021) 84 mg 0     ondansetron (ZOFRAN-ODT) 4 MG ODT tab Take 1 tablet (4 mg) by mouth every 8 hours as needed for nausea (Patient not taking: Reported on 2/23/2021) 10 tablet 0     predniSONE (DELTASONE) 5 MG tablet Take 15 mg (3 tablets )every morning (Patient not taking: Reported on 2/23/2021) 90 tablet 0     Vedolizumab (ENTYVIO IV) Inject 300 mg into the vein every 28 days        No facility-administered encounter medications on file as of 2/23/2021.       NSAID  none    Review of Systems  Complete 10 System ROS performed. All are negative except as documented below, in the HPI, or in patient questionnaire from today's visit.    1) Constitutional: No fevers, chills, night sweats or malaise, weight loss or gain  2) Skin: No rash  3)  "Pulmonary: No wheeze, SOB, cough, sputum or hemoptysis  4) Cardiovascular: No Chest pain or palpitations  5) Genitourinary: No blood in urine or dysuria  6) Endocrine: No increased sweating, hunger, thirst or thyroid problems  7) Hematologic: No bruising and easy bleeding  8) Musculoskeletal: no new pain in joints or limitation in ROM  9) Neurologic: No dizziness, paresthesias or weakness or falls  10) Psychiatric:  not depressed/anxious, no sleep problems    PHYSICAL EXAM  Vitals: Ht 1.702 m (5' 7\")   Wt 53.5 kg (118 lb)   BMI 18.48 kg/m      No Pain (0)       General appearance  Healthy appearing adult, in no acute distress     Eyes  Sclera anicteric  Pupils round and reactive to light     Ears, nose, mouth and throat  No obvious external lesions of ears and nose  Hearing intact     Neck  Symmetric  No obvious external lesions     Respiratory  Normal respiration, no use of accessory muscles      MSK  Gait normal     Skin  No rashes or jaundice      Psychiatric  Oriented to person, place and time  Appropriate mood and affect.     DATA:  Reviewed in detail past documentation, medications and prior workup available in electronic health records or through outside records.    PERTINENT STUDIES:  C diff neg 8/2020   Fecal calprotectin 6/2020 74   Labs 6/2020 normal.       Most recent CBC:  WBC   Date Value Ref Range Status   11/24/2020 14.6 (H) 4.0 - 11.0 10e9/L Final   ]  Hemoglobin   Date Value Ref Range Status   11/24/2020 11.2 (L) 11.7 - 15.7 g/dL Final   ]   Platelet Count   Date Value Ref Range Status   11/24/2020 408 150 - 450 10e9/L Final     Most recent hepatic panel:  AST   Date Value Ref Range Status   11/24/2020 6 0 - 45 U/L Final     ALT   Date Value Ref Range Status   11/24/2020 14 0 - 50 U/L Final     No results found for: BILICONJ   Bilirubin Total   Date Value Ref Range Status   11/24/2020 0.2 0.2 - 1.3 mg/dL Final     Albumin   Date Value Ref Range Status   11/24/2020 2.9 (L) 3.4 - 5.0 g/dL Final "     Alkaline Phosphatase   Date Value Ref Range Status   11/24/2020 51 40 - 150 U/L Final       Most recent creatinine:  Creatinine   Date Value Ref Range Status   11/24/2020 0.66 0.52 - 1.04 mg/dL Final       Endoscopy:     Flex sig 9/2020 showed Tolliver 3 colitis from rectum to descending colon    icscope 9/2019:  Impression:   - The examined portion of the ileum                        was normal.                       - One 10 to 11 mm polyp in the                        sigmoid colon, removed with a hot                        snare. Resected and retrieved.                       - Pancolitis. Inflammation was found                        from the anus to the cecum. This was                        mild in severity and graded as Tolliver                        Score 1 (mild disease). Biopsied.      icscope 8/2017 showed E3 colitis (severity unclear): Congested, erythematous and eroded                        mucosa in the entire examined colon.                        Biopsied from a) right colon and b)                        left colon. Findings consistent with                        ulcerative pan colitis.    Specimens:   A) - Colon, , Right colon bx's                                                                      B) - Colon, , Left colon bx's                                                                       C) - Colon, sigmoid                                                                        FINAL DIAGNOSIS A.  Colon, right, biopsy -- Colonic mucosal fragments with minimal crypt architectural distortion and minimal, focal activity    B.  Colon, left, biopsy -- Colonic mucosal fragments with minimal crypt architectural distortion and minimal, focal activity    C.  Colon, sigmoid, polypectomy -- Inflammatory polyp       IMPRESSION:    Ms. Caputo is a 25 year old here with pan UC on Stelara monotherapy since Nov 2020. She recently stopped prednisone and is doing well. Still continues on budesonide foam  twice daily. I am pleased with the fact that Xochitl is no longer steroid-dependent. She does report occasional hemorrhoid discomfort/bleeding.     We will proceed with the following:    DIAGNOSIS:  # Pan UC, on Stelara monotherapy initiated 1124/2020, now off prednisone   # Heartburn (likely 2/2 prednisone), responsive to PPI therapy    PLAN:  ---High fiber diet with water for hemorrhoid  ---Miralax OK for hemorrhoid  ---Decrease foam to daily x 2 weeks, then EOD x 1-2 weeks.  ----------When course is completed, will recheck fecal calprotectin (while on Stelara monotherapy)  ---Colonoscopy in May for disease activity assessment. With MIralax prep. Xochitl will let us know if she wants MAC or CS.     Misc:  -- Avoid tobacco use  -- Avoid NSAIDs as there is potentially a 25% chance of causing an IBD flare    Return in about 3 months (around 5/23/2021).    Poonam Dunbar MD   of Medicine  Division of Gastroenterology, Hepatology and Nutrition  [facility]      Video-Visit Details  Type of service:  Video Visit    Video Start Time: 1:03 PM  Video End Time:  1:32 PM    Originating Location (pt. Location): Home    Distant Location (provider location):  [facility] [place]     Platform used: H5

## 2021-02-23 NOTE — PROGRESS NOTES
Spoke to the patient today in regards to how she is feeling.  She has been able to discontinue his prednisone and is feeling well. She is having one solid stool a day with, that is sometimes a little constipated.  she is also having trouble with a hemorrhoid that she will speak to Dr Dunbar about during her visit today.  patient visit time changed from 4:20 to 1 00 due to a cancellation today

## 2021-02-23 NOTE — PATIENT INSTRUCTIONS
PLAN  ---High fiber diet with water  ---Miralax OK for hemorrhoid  ---Decrease foam to daily x 2 weeks, then EOD x 1-2 weeks.  ----------When course is completed, will recheck fecal calprotectin (while on Stelara monotherapy)  ---Colonoscopy in May for disease activity assessment. With MIralax prep.

## 2021-02-23 NOTE — PROGRESS NOTES
"Xochitl Caputo is a 25 year old female who is being evaluated via a billable video visit.      The patient has been notified of following:     \"This video visit will be conducted via a call between you and your physician/provider. We have found that certain health care needs can be provided without the need for an in-person physical exam.  This service lets us provide the care you need with a video conversation.  If a prescription is necessary we can send it directly to your pharmacy.  If lab work is needed we can place an order for that and you can then stop by our lab to have the test done at a later time.    If during the course of the call the physician/provider feels a video visit is not appropriate, you will not be charged for this service.\"     Patient confirmed that they are in [place] for today's visit yes    If the video visit is dropped, please send link to:   Text to cell phone: [phone]       [facility] UC follow up       PATIENT: Xochitl Caputo    MRN: [id number]    Date of Birth 1995    Tel: [phone] (home) NONE (work)    PCP: Jillian Tejada MD     HPI: Ms. Caputo is a 25 year old female here to establish care for UC.     UC history  Was sick before she was diagnosed in 2017. She was seen at [facility] prior to this and was told she had IBS and dehydration.  Hgb was 5.9. Switched care to [facility]. Tried Lialda, imuran, Humira. Transitioned to Entyvio     and has been in symptomatic remission from 11/2018-6/2020. Around June, developed a flare in the setting of psychological stress (pandemic, riots, etc). Lost 22 lb, was on 2 courses prednisone (9/17-present). Currently on 20 mg daily.  Had 3 Entyvio infusions on q4w dosing at this point. Had a flex sig 9/16/2020 (below).     Screening appointment for FMT study here at the .   8/2018 established care with [place] [facility] (integrative medicine) which helped. Switched to [facility] about a year ago. " "    Meds: Entyvio q4w, low dose naltrexone, levothyroxine, spirinolactone (acne)  Supplements: inflamax (powder), fish oil, reversatrol extract, tumeric with black pepper (740 mg), HENRY Glasgow, VSL #3     Gets acupuncture and recently started LED light therapy (a belt of LED lights)    Macroscopic extent of disease (most recent) E3    Current UC symptoms    Bowel frequency in day 3-4   Bowel frequency in night none  Urgency of defecation YES occasionally, mild   Blood in stool only with wiping   General well being 2 = poor  Extracolonic features (multiple select) none     Constitutional symptoms:  Fever NO  Weight loss YES 22 lb since June     Noteworthy diet history- GFD, very low dairy, avoids eggs, avoid processed foods/sugars. Avoids raw veggies when flaring.     Total number of IBD surgeries (except perianal): 0    Current IBD Medications:  Entyvio q4w  Prednisone 20 mg daily     Past IBD Medications:   Imuran - got PNA, \"didn't feel like myself\"   Humira -- primary nonresponder   Liaonela    Interval history, 11/2020 (video visit)  Has been on prednisone 25 mg daily but can't taper off this further (loose, frequent stools and more blood).   Reports no response to Humira in the past; was on this for 2 months or so. Levels were not checked and did not escalate to EW dosing.     Interval history, 12/2020 (video visit)  Currently on 20 mg prednisone daily. Having some blood, but less than before.   Continues on budesonide foam, which is helpful (but had a setback in the s/o of menstruation).   Had Stelara infusion on 11/24/2020.   Met with Dr. Jeffries on 11/23 with goals to lower fat and increase soluble fiber, follow IBD AID diet.     Current UC symptoms  Bowel frequency in day loose stools in the morning (2) and in the evening (1) (not in the afternoon)  Bowel frequency in night gas +/- stool  Urgency of defecation YES occasionally, mild   Blood in stool 50%; alternatives between the toilet and only with wiping "   General well being 2 = poor  Extracolonic features (multiple select) none     Interval history, 1/2021 (video visit)  Off prednisone x 3 days. Continues on budesonide foam x .   Has been experiencing constipation and a mildly painful hemorrhoid.  Last Stelara injection was 1/20/2021    Current UC symptoms  Bowel frequency in day 3 (except 5/day with menstruation)  Bowel frequency in night none  Urgency of defecation No  Blood in stool none    General well being 0 = very well  Extracolonic features (multiple select) none        Past Medical History:   Diagnosis Date     Acne      Hypothyroidism      Ulcerative colitis (H)      Ulcerative colitis (H) 10/28/2020        Past Surgical History:   Procedure Laterality Date     EYE SURGERY       ORTHOPEDIC SURGERY      right ankle.       Social History     Tobacco Use     Smoking status: Never Smoker     Smokeless tobacco: Never Used   Substance Use Topics     Alcohol use: Not on file       Family History   Problem Relation Age of Onset     Colitis Maternal Grandfather      Ulcerative Colitis Maternal Grandfather      Crohn's Disease No family hx of      GERD No family hx of      Stomach Cancer No family hx of        No Known Allergies     Outpatient Encounter Medications as of 2/23/2021   Medication Sig Dispense Refill     Black Pepper-Turmeric 5-1000 MG CAPS Take 2,000 mg by mouth daily        calcium carbonate (OS-KATIA) 1500 (600 Ca) MG tablet Take 600 mg by mouth daily        cannabidiol (EPIDIOLEX) 100 MG/ML oral solution Take 30 mg by mouth       clindamycin (CLINDAMAX) 1 % external gel APPLY TO FACE TWICE A DAY FOR ACNE.       Ferrous Sulfate 324 (65 Fe) MG TBEC Take 325 mg by mouth daily as needed (if flaring)       HERBALS Metagenix powder ultra inflam x+360 2 scoops daily       levothyroxine (SYNTHROID/LEVOTHROID) 50 MCG tablet Take 50 mcg by mouth       multivitamin w/minerals (THERA-VIT-M) tablet Take 1 tablet by mouth 2 times daily        naltrexone  (DEPADE/REVIA) 50 MG tablet Low Dose Naltrexone compounded to 4.5mg. Take 1 capsule daily by mouth at bedtime.       Omega-3 Fatty Acids (OMEGA-3 EPA FISH OIL PO) Take 1,640 mg by mouth daily        omeprazole (PRILOSEC) 40 MG DR capsule Take 1 capsule (40 mg) by mouth daily 90 capsule 3     Probiotic Product (VISBIOME PO)        Resveratrol 250 MG CAPS Take 1 capsule by mouth       saccharomyces boulardii (FLORASTOR) 250 MG capsule Take 1 capsule by mouth       sertraline (ZOLOFT) 50 MG tablet        spironolactone (ALDACTONE) 50 MG tablet Take 50 mg by mouth       ustekinumab (STELARA) 90 MG/ML Inject 1 ml ( 90 mg) subcutaneous every 8 weeks.  First injection on 01/20/2021 1 Syringe 5     Vitamin D3 (CHOLECALCIFEROL) 125 MCG (5000 UT) tablet Take 1 tablet by mouth every other day       Budesonide 2 MG/ACT FOAM Place 2 mg rectally 2 times daily for 14 days, THEN 2 mg daily for 14 days. (Patient not taking: Reported on 2/23/2021) 84 mg 0     ondansetron (ZOFRAN-ODT) 4 MG ODT tab Take 1 tablet (4 mg) by mouth every 8 hours as needed for nausea (Patient not taking: Reported on 2/23/2021) 10 tablet 0     predniSONE (DELTASONE) 5 MG tablet Take 15 mg (3 tablets )every morning (Patient not taking: Reported on 2/23/2021) 90 tablet 0     Vedolizumab (ENTYVIO IV) Inject 300 mg into the vein every 28 days        No facility-administered encounter medications on file as of 2/23/2021.       NSAID  none    Review of Systems  Complete 10 System ROS performed. All are negative except as documented below, in the HPI, or in patient questionnaire from today's visit.    1) Constitutional: No fevers, chills, night sweats or malaise, weight loss or gain  2) Skin: No rash  3) Pulmonary: No wheeze, SOB, cough, sputum or hemoptysis  4) Cardiovascular: No Chest pain or palpitations  5) Genitourinary: No blood in urine or dysuria  6) Endocrine: No increased sweating, hunger, thirst or thyroid problems  7) Hematologic: No bruising and easy  "bleeding  8) Musculoskeletal: no new pain in joints or limitation in ROM  9) Neurologic: No dizziness, paresthesias or weakness or falls  10) Psychiatric:  not depressed/anxious, no sleep problems    PHYSICAL EXAM  Vitals: Ht 1.702 m (5' 7\")   Wt 53.5 kg (118 lb)   BMI 18.48 kg/m      No Pain (0)       General appearance  Healthy appearing adult, in no acute distress     Eyes  Sclera anicteric  Pupils round and reactive to light     Ears, nose, mouth and throat  No obvious external lesions of ears and nose  Hearing intact     Neck  Symmetric  No obvious external lesions     Respiratory  Normal respiration, no use of accessory muscles      MSK  Gait normal     Skin  No rashes or jaundice      Psychiatric  Oriented to person, place and time  Appropriate mood and affect.     DATA:  Reviewed in detail past documentation, medications and prior workup available in electronic health records or through outside records.    PERTINENT STUDIES:  C diff neg 8/2020   Fecal calprotectin 6/2020 74   Labs 6/2020 normal.       Most recent CBC:  WBC   Date Value Ref Range Status   11/24/2020 14.6 (H) 4.0 - 11.0 10e9/L Final   ]  Hemoglobin   Date Value Ref Range Status   11/24/2020 11.2 (L) 11.7 - 15.7 g/dL Final   ]   Platelet Count   Date Value Ref Range Status   11/24/2020 408 150 - 450 10e9/L Final     Most recent hepatic panel:  AST   Date Value Ref Range Status   11/24/2020 6 0 - 45 U/L Final     ALT   Date Value Ref Range Status   11/24/2020 14 0 - 50 U/L Final     No results found for: BILICONJ   Bilirubin Total   Date Value Ref Range Status   11/24/2020 0.2 0.2 - 1.3 mg/dL Final     Albumin   Date Value Ref Range Status   11/24/2020 2.9 (L) 3.4 - 5.0 g/dL Final     Alkaline Phosphatase   Date Value Ref Range Status   11/24/2020 51 40 - 150 U/L Final       Most recent creatinine:  Creatinine   Date Value Ref Range Status   11/24/2020 0.66 0.52 - 1.04 mg/dL Final       Endoscopy:     Flex sig 9/2020 showed Tolliver 3 colitis from " rectum to descending colon    icscope 9/2019:  Impression:   - The examined portion of the ileum                        was normal.                       - One 10 to 11 mm polyp in the                        sigmoid colon, removed with a hot                        snare. Resected and retrieved.                       - Pancolitis. Inflammation was found                        from the anus to the cecum. This was                        mild in severity and graded as Tolliver                        Score 1 (mild disease). Biopsied.      icscope 8/2017 showed E3 colitis (severity unclear): Congested, erythematous and eroded                        mucosa in the entire examined colon.                        Biopsied from a) right colon and b)                        left colon. Findings consistent with                        ulcerative pan colitis.    Specimens:   A) - Colon, , Right colon bx's                                                                      B) - Colon, , Left colon bx's                                                                       C) - Colon, sigmoid                                                                        FINAL DIAGNOSIS A.  Colon, right, biopsy -- Colonic mucosal fragments with minimal crypt architectural distortion and minimal, focal activity    B.  Colon, left, biopsy -- Colonic mucosal fragments with minimal crypt architectural distortion and minimal, focal activity    C.  Colon, sigmoid, polypectomy -- Inflammatory polyp       IMPRESSION:    Ms. Caputo is a 25 year old here with pan UC on Stelara monotherapy since Nov 2020. She recently stopped prednisone and is doing well. Still continues on budesonide foam twice daily. I am pleased with the fact that Xochitl is no longer steroid-dependent. She does report occasional hemorrhoid discomfort/bleeding.     We will proceed with the following:      DIAGNOSIS:  # Pan UC, on Stelara monotherapy initiated 1124/2020, now off prednisone    # Heartburn (likely 2/2 prednisone), responsive to PPI therapy    PLAN:  ---High fiber diet with water for hemorrhoid  ---Miralax OK for hemorrhoid  ---Decrease foam to daily x 2 weeks, then EOD x 1-2 weeks.  ----------When course is completed, will recheck fecal calprotectin (while on Stelara monotherapy)  ---Colonoscopy in May for disease activity assessment. With MIralax prep. Xochitl will let us know if she wants MAC or CS.     Misc:  -- Avoid tobacco use  -- Avoid NSAIDs as there is potentially a 25% chance of causing an IBD flare    Return in about 3 months (around 5/23/2021).    Poonam Dunbar MD   of Medicine  Division of Gastroenterology, Hepatology and Nutrition  [facility]    Video-Visit Details  Type of service:  Video Visit    Video Start Time: 1:03 PM  Video End Time:  1:32 PM    Originating Location (pt. Location): Home    Distant Location (provider location):  [facility] [place]     Platform used: [employer]Mind

## 2021-02-23 NOTE — NURSING NOTE
"Chief Complaint   Patient presents with     RECHECK     Follow up per pt.        Vitals:    02/23/21 1239   Weight: 53.5 kg (118 lb)   Height: 1.702 m (5' 7\")       Body mass index is 18.48 kg/m .                            MARY WAGNER, EMT    "

## 2021-02-24 ENCOUNTER — PATIENT OUTREACH (OUTPATIENT)
Dept: GASTROENTEROLOGY | Facility: CLINIC | Age: 26
End: 2021-02-24

## 2021-02-24 ENCOUNTER — DOCUMENTATION ONLY (OUTPATIENT)
Dept: GASTROENTEROLOGY | Facility: CLINIC | Age: 26
End: 2021-02-24

## 2021-02-24 DIAGNOSIS — K51.90 ULCERATIVE COLITIS (H): Primary | ICD-10-CM

## 2021-02-24 NOTE — PROGRESS NOTES
Per yesterdays clinic encounter Order placed for the FCP testing.  Kit will be sent to the patient and orders placed for a CS colonoscopy.

## 2021-02-26 ENCOUNTER — TELEPHONE (OUTPATIENT)
Dept: GASTROENTEROLOGY | Facility: CLINIC | Age: 26
End: 2021-02-26

## 2021-02-26 NOTE — TELEPHONE ENCOUNTER
1st schedule attempt    Procedure: Lower Endoscopy with Dr. Dunbar in May please    Lower Endoscopy Type: Colonoscopy    Purpose of Colonoscopy Procedure: Screening    Colonoscopy Sedation: Conscious/Moderate    Preferred Location: [facility]    Scheduling Instructions: If you have not heard from the scheduling office within 2 business days, please call [phone].           Associated Diagnoses    Ulcerative colitis (H) [K51.90]  - Primary

## 2021-03-01 ENCOUNTER — TELEPHONE (OUTPATIENT)
Dept: GASTROENTEROLOGY | Facility: CLINIC | Age: 26
End: 2021-03-01

## 2021-03-01 NOTE — TELEPHONE ENCOUNTER
Patient is scheduled for COLONOSCOPY with Dr. HUDDLESTON    Spoke with: URIEL    Date of Procedure: 05/03/2021    Location: [facility]    Sedation Type CS    Conscious Sedation- Needs  for 6 hours after the procedure  MAC/General-Needs  for 24 hours after procedure    Pre-op for Unit [address] MAC and OR NOT NEEDED    (if yes advise patient they will need a pre-op prior to procedure)      Is patient on blood thinners? -NO (If yes- inform patient to follow up with PCP or provider for follow up instructions)     Informed patient they will need an adult  YES  Cannot take any type of public or medical transportation alone    Informed Patient of COVID Test Requirement YES    Preferred Pharmacy for Pre Prescription NA    Confirmed Nurse will call to complete assessment YES    Additional comments: NA

## 2021-03-08 ENCOUNTER — VIRTUAL VISIT (OUTPATIENT)
Dept: GASTROENTEROLOGY | Facility: CLINIC | Age: 26
End: 2021-03-08
Attending: INTERNAL MEDICINE
Payer: COMMERCIAL

## 2021-03-08 DIAGNOSIS — K51.90 ULCERATIVE COLITIS (H): ICD-10-CM

## 2021-03-08 DIAGNOSIS — F43.22 ADJUSTMENT DISORDER WITH ANXIOUS MOOD: Primary | ICD-10-CM

## 2021-03-08 PROCEDURE — 90791 PSYCH DIAGNOSTIC EVALUATION: CPT | Mod: TEL | Performed by: PSYCHOLOGIST

## 2021-03-08 ASSESSMENT — ANXIETY QUESTIONNAIRES
6. BECOMING EASILY ANNOYED OR IRRITABLE: SEVERAL DAYS
3. WORRYING TOO MUCH ABOUT DIFFERENT THINGS: NOT AT ALL
1. FEELING NERVOUS, ANXIOUS, OR ON EDGE: NOT AT ALL
4. TROUBLE RELAXING: SEVERAL DAYS
5. BEING SO RESTLESS THAT IT IS HARD TO SIT STILL: NOT AT ALL
GAD7 TOTAL SCORE: 2
7. FEELING AFRAID AS IF SOMETHING AWFUL MIGHT HAPPEN: NOT AT ALL
2. NOT BEING ABLE TO STOP OR CONTROL WORRYING: NOT AT ALL

## 2021-03-08 ASSESSMENT — PATIENT HEALTH QUESTIONNAIRE - PHQ9: SUM OF ALL RESPONSES TO PHQ QUESTIONS 1-9: 3

## 2021-03-08 NOTE — LETTER
Date:May 6, 2021      Provider requested that no letter be sent. Do not send.       [facility]

## 2021-03-08 NOTE — LETTER
3/8/2021         RE: Xochitl Caputo  [address]  [facility] [address]        Dear Colleague,    Thank you for referring your patient, Xochitl Caputo, to the [facility] [place]. Please see a copy of my visit note below.    This telehealth service is appropriate and effective for delivering services in light of the necessity for social distancing to mitigate the COVID-19 epidemic and for conservation of PPE.     Patient has agreed to receiving telehealth services after being informed about it: Yes    Patient prefers video invitation/information to be sent by:   email    Time service started: 1:07p  Time service ended: 2:04p    Mode of transmission: Amwell - started but transitioned to telephone    Location of originating:  Home of the patient    Distance site:  Home office of provider for [facility]    The patient has been notified that:  Video visits will be conducted via a call with their psychologist to provide the care they need with a video conversation. Video visits may be billed at different rates depending on insurance coverage.  Patients are advised to please contact their insurance provider with any questions about their health insurance coverage. If during the course of a call the psychologist feels a video visit is not appropriate, patients will not be charged for this service.      Health Psychology                  Clinic    Department of Medicine  Maria Alejandra Serrano, PhD, LP [phone]                          Clinics and Surgery Center  [facility] Magali Francisco, PhD, LP [phone]                  [facility] Mail Code 741   Ryan Cochran, PhD, ABPP, LP [phone]     [facility],   [address] [facility],  Pau Saravia,  PhD, LP [phone]            [place]  [address]  [place] [address] Danii Stephen, PhD, LP [phone]     Laisha Cueto, PhD, LP [phone]     Confidential Summary of  Standard Psychodiagnostic Evaluation*    Referral Source:  Poonam Dunbar MD, [facility]    Reason for Referral:  Adjustment and coping with chronic illness    Sources of Information:  Information was obtained from a clinical interview with the patient, review of available medical records, and administration of psychological assessments.     Informed Consent:  Informed consent included a review of the nature and purpose of the assessment, billing, and confidentiality and limits thereof. Discussed role of GI psychologist in multidisciplinary GI care team in and documentation of visit in EMR.     History of Presenting Concerns:  Xochitl Caputo is a 25 year old female with history of ulcerative colitis who presents for additional psychosocial support in the context of coping with illness.  She states that she believes anybody with IBD could benefit from addressing mental health and that she also feels that since she does not know anybody else with IBD she could benefit from having an extra source of support despite her support network with family and friends being strong.  She endorsed being in a low point regarding living with chronic illness earlier this winter after being in a prolonged, difficult to treat flare from June 2020 to February 2021.  She also stated she has a pre-existing history of anxiety prior to diagnosis with IBD.  She described her history with IBD which included beginning to have IBD symptoms in September 2016 during her senior year of college; she believes symptoms began in the context of breaking up with a long-term boyfriend.  She was initially treated at [facility] and diagnosed with IBS but had additional symptoms including difficulty tolerating exercise which led her to ongoing testing elsewhere.  She was eventually diagnosed with ulcerative colitis following identification of very low hemoglobin levels, was treated with Entyvio and in  "remission for about 2 years.  Then, a flare began in June 2020 as Booker Rucker's death and uncertainty regarding living with inflammatory bowel disease and the pandemic stirred up a lot of emotions and created \"constant anxiety\".  She recently started sertraline 50 mg prescribed by her primary care provider and described this medication to be very effective for her and as such she has noticed a significant reduction in anxiety.  Previous symptoms were feeling anxious leaving her home for fear of being away from a restaurant if needed, tightness in her chest, worry especially about illness and death, disrupted sleep, and concerns about the impact of her illness on others.  She noted a strong connection between IBD flares and anxiety and stress.  She is looking for strategies to improve her ability to stay in remission through both pharmacologic medications and working with health psychology.  When in a flare she described IBD and anxiety to affect every aspect of her life such as difficulty enjoying social activities, feeling comfortable leaving her apartment, and concerns about feeling like a liability to her loved ones.      Medical History:    Past Medical History:   Diagnosis Date     Acne      Hypothyroidism      Ulcerative colitis (H)      Ulcerative colitis (H) 10/28/2020       Past Surgical History:   Procedure Laterality Date     EYE SURGERY       ORTHOPEDIC SURGERY      right ankle.       Current Outpatient Medications   Medication     Black Pepper-Turmeric 5-1000 MG CAPS     calcium carbonate (OS-KATIA) 1500 (600 Ca) MG tablet     cannabidiol (EPIDIOLEX) 100 MG/ML oral solution     clindamycin (CLINDAMAX) 1 % external gel     Ferrous Sulfate 324 (65 Fe) MG TBEC     HERBALS     levothyroxine (SYNTHROID/LEVOTHROID) 50 MCG tablet     multivitamin w/minerals (THERA-VIT-M) tablet     naltrexone (DEPADE/REVIA) 50 MG tablet     Omega-3 Fatty Acids (OMEGA-3 EPA FISH OIL PO)     omeprazole (PRILOSEC) 40 MG DR capsule "     ondansetron (ZOFRAN-ODT) 4 MG ODT tab     predniSONE (DELTASONE) 5 MG tablet     Probiotic Product (VISBIOME PO)     Resveratrol 250 MG CAPS     saccharomyces boulardii (FLORASTOR) 250 MG capsule     sertraline (ZOLOFT) 50 MG tablet     spironolactone (ALDACTONE) 50 MG tablet     ustekinumab (STELARA) 90 MG/ML     Vedolizumab (ENTYVIO IV)     Vitamin D3 (CHOLECALCIFEROL) 125 MCG (5000 UT) tablet     No current facility-administered medications for this visit.        Patient Care Team:  Jillian Tejada MD, MD as PCP - General (Internal Medicine)  Poonam Dunbar MD as Assigned Gastroenterology Provider  Nina Escalona [facility] as Pharmacist (Pharmacist Ambulatory Care)  Alexandra Houston RN as Specialty Care Coordinator (Gastroenterology)     Current Health Behaviors  Alcohol use: Endorses intermittent alcohol use with drinking 1-2 drink at a time.   Drug use: Uses CBD for anxiety via tincture  Tobacco use: None  Caffeine use: None   Exercise: Piliates and walking - higher intensity exercises are avoided as she doesn't want to trigger anxiety symptoms  Sleep: The patient did not report difficulty with sleep initiation, sleep maintenance but did endorse early morning waking around 4a, but is able to get back to sleep between 10-45 minutes later.  They estimated sleeping on average 7-8 hours per night, with typically sleeping between 11:30-8:30       Psychiatric History:  Past diagnoses: No formal diagnosis but acknowledges long standing anxiety and history of excessive exercise/calorie tracking (grew out of this)  Current symptoms:   Outpatient treatment: History of brief treatment at [facility] for excessive exercise/calorie tracking in 2013 - not active not, feels interested in eating any food she can tolerate.   Psychotropic medication use: Currently on Sertraline 50 mg prescribed by PCP  Inpatient treatment: None reported  Family history: Maternal uncle has severe OCD; notes many maternal family members  have undiagnosed anxiety     Substance Use History:  Reported no alcohol or drug use concerns.     Social History:    Current Living Arrangements: Lives in [place] with a friend from high school    Relationship Status/Family/Children: recently engaged, no previous marriage or children     Social Support: Good support with friends, fiance, and parents     Developmental History: Raised in [place] with her mother, father. Has a half brother and sister who are about 15 years older. Described her upbringing as great.     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       Psychological Assessment:  General Psychosocial Functioning  The patient completed the following battery of assessments during this psychological evaluation: World Health Organization Disability Assessment Schedule 2.0 12-item (WHODAS), Patient Health Questionnaire-9 (PHQ-9), Generalized Anxiety Disorder-7 screener (JAYDEN-7), and the CAGE Questionnaire Adapted to Include Drugs (CAGE-AID).    The WHODAS measures disability and functional impairment due to health conditions including diseases, illnesses, injuries, mental or emotional problems, and problems with alcohol or drugs. The possible range of scores is 12-60 and higher scores indicate higher levels of disability.     .  WHODAS 2.0 Total Score 3/8/2021   Total Score 17       The PHQ-9 is an instrument for screening, diagnosing, monitoring and measuring the severity of depression. Scores of 5, 10, 15, and 20 represent cutpoints for mild, moderate, moderately severe and severe depression, respectively.      PHQ 3/8/2021   PHQ-9 Total Score 3   Q9: Thoughts of better off dead/self-harm past 2 weeks Not at all       The JAYDEN-7 is an instrument for screening, diagnosing, monitoring and measuring the severity of anxiety. Scores of 5, 10, and 15 represent cutpoints for mild, moderate, and severe anxiety, respectively.    JAYDEN-7 SCORE 3/8/2021   Total Score 2         The CAGE-AID questionnaire is used to screen for alcohol or drug abuse and dependence in adults. A CAGE-AID score  > 1 is a positive screen, suggesting further discussion is needed to determine if evaluation for alcohol or substance abuse is appropriate. A score > 2 is considered clinically significant, suggesting further evaluation of alcohol or substance-related problems is indicated.  No flowsheet data found.  Not administered due to time constraints.       Mental Status Examination:  Appearance/Behavior/Orientation: Alert and oriented to person, place, time, and situation.     Cooperation/Reliability: Patient was open and cooperative throughout the session.    Speech/Language: Speech was clear, coherent, and of normal rate, rhythm and volume.   Thought Form: Overall logical and organized.   Thought Content: Appropriate to interview and situation.  Cognition/Memory: Not formally assessed, but no difficulties apparent upon interview.   Attention/Concentration: Good throughout interview.    Fund of knowledge: Consistent with age and level of education.    Abstract reasoning: Not assessed.   Judgment: Intact.    Mood/Affect: Mood euthymic; appropriate range of affect.    Insight/Motivation: Good, good  Suicide/Assault: Patient denies suicidal or assaultive ideation, plan, or intent.    Impression:  Xochitl Caputo is a 25 year old female with longstanding anxiety who discusses strong connection between anxiety/stress and GI symptoms. She recently started sertraline 50 mg which was reported to give a good benefit in anxiety reduction. Previously she discussed  significant anxiety, chest tension, worry, and difficulty sleeping in the context of her UC flare. She remains interested in psychotherapy as an additional tool for anxiety and chronic illness management.     Diagnosis:  Adjustment disorder with anxious mood  Rule out generalized anxiety disorder    Recommendation/Plan:  Recommended follow-up with health psychology for mindfulness based cognitive behavioral interventions for anxiety management - strategies to include relaxation training, increasing tolerance of uncertainty, cognitive restructuring, and mindfulness interventions. She agreed with treatment recommendations - provided scheduling information. A treatment plan will be competed at the next session.      Pau Saravia, ,   Clinical Health Psychologist    *In accordance with the Rules of the [place] Board of [employer], it is noted that psychological descriptions and scientific procedures underlying psychological evaluations have limitations.  Absolute predictions cannot be made based on information in this report.      This note was completed using Dragon voice recognition software.  Although reviewed after completion, some word and grammatical errors may occur.            Again, thank you for allowing me to participate in the care of your patient.        Sincerely,        Pau Saravia, PhD

## 2021-03-08 NOTE — PROGRESS NOTES
This telehealth service is appropriate and effective for delivering services in light of the necessity for social distancing to mitigate the COVID-19 epidemic and for conservation of PPE.     Patient has agreed to receiving telehealth services after being informed about it: Yes    Patient prefers video invitation/information to be sent by:   email    Time service started: 1:07p  Time service ended: 2:04p    Mode of transmission: Amwell - started but transitioned to telephone    Location of originating:  Home of the patient    Distance site:  Home office of provider for [facility]    The patient has been notified that:  Video visits will be conducted via a call with their psychologist to provide the care they need with a video conversation. Video visits may be billed at different rates depending on insurance coverage.  Patients are advised to please contact their insurance provider with any questions about their health insurance coverage. If during the course of a call the psychologist feels a video visit is not appropriate, patients will not be charged for this service.      Health Psychology                  Clinic    Department of Medicine  Maria Alejandra Serrano, PhD, LP [phone]                          [facility]  [facility] Magali Francisco, PhD, LP [phone]                  [facility] Mail Code 741   Ryan Cochran, PhD, ABPP, LP [phone]     [facility], [address],  Pau Saravia,  PhD, LP [phone]            [place] [address] Danii Stephen, PhD, LP [phone]     Laisha Cueto, PhD, LP [phone]     Confidential Summary of Standard Psychodiagnostic Evaluation*    Referral Source:  Poonam Dunbar MD, [facility] IBD [facility]    Reason for Referral:  Adjustment and coping with chronic illness    Sources of Information:  Information was obtained from a clinical interview with the patient,  "review of available medical records, and administration of psychological assessments.     Informed Consent:  Informed consent included a review of the nature and purpose of the assessment, billing, and confidentiality and limits thereof. Discussed role of GI psychologist in multidisciplinary GI care team in and documentation of visit in EMR.     History of Presenting Concerns:  Xochitl Caputo is a 25 year old female with history of ulcerative colitis who presents for additional psychosocial support in the context of coping with illness.  She states that she believes anybody with IBD could benefit from addressing mental health and that she also feels that since she does not know anybody else with IBD she could benefit from having an extra source of support despite her support network with family and friends being strong.  She endorsed being in a low point regarding living with chronic illness earlier this winter after being in a prolonged, difficult to treat flare from June 2020 to February 2021.  She also stated she has a pre-existing history of anxiety prior to diagnosis with IBD.  She described her history with IBD which included beginning to have IBD symptoms in September 2016 during her senior year of college; she believes symptoms began in the context of breaking up with a long-term boyfriend.  She was initially treated at [facility] and diagnosed with IBS but had additional symptoms including difficulty tolerating exercise which led her to ongoing testing elsewhere.  She was eventually diagnosed with ulcerative colitis following identification of very low hemoglobin levels, was treated with Entyvio and in remission for about 2 years.  Then, a flare began in June 2020 as Booker Rucker's death and uncertainty regarding living with inflammatory bowel disease and the pandemic stirred up a lot of emotions and created \"constant anxiety\".  She recently started sertraline 50 mg prescribed by her " primary care provider and described this medication to be very effective for her and as such she has noticed a significant reduction in anxiety.  Previous symptoms were feeling anxious leaving her home for fear of being away from a restaurant if needed, tightness in her chest, worry especially about illness and death, disrupted sleep, and concerns about the impact of her illness on others.  She noted a strong connection between IBD flares and anxiety and stress.  She is looking for strategies to improve her ability to stay in remission through both pharmacologic medications and working with health psychology.  When in a flare she described IBD and anxiety to affect every aspect of her life such as difficulty enjoying social activities, feeling comfortable leaving her apartment, and concerns about feeling like a liability to her loved ones.      Medical History:    Past Medical History:   Diagnosis Date     Acne      Hypothyroidism      Ulcerative colitis (H)      Ulcerative colitis (H) 10/28/2020       Past Surgical History:   Procedure Laterality Date     EYE SURGERY       ORTHOPEDIC SURGERY      right ankle.       Current Outpatient Medications   Medication     Black Pepper-Turmeric 5-1000 MG CAPS     calcium carbonate (OS-KATIA) 1500 (600 Ca) MG tablet     cannabidiol (EPIDIOLEX) 100 MG/ML oral solution     clindamycin (CLINDAMAX) 1 % external gel     Ferrous Sulfate 324 (65 Fe) MG TBEC     HERBALS     levothyroxine (SYNTHROID/LEVOTHROID) 50 MCG tablet     multivitamin w/minerals (THERA-VIT-M) tablet     naltrexone (DEPADE/REVIA) 50 MG tablet     Omega-3 Fatty Acids (OMEGA-3 EPA FISH OIL PO)     omeprazole (PRILOSEC) 40 MG DR capsule     ondansetron (ZOFRAN-ODT) 4 MG ODT tab     predniSONE (DELTASONE) 5 MG tablet     Probiotic Product (VISBIOME PO)     Resveratrol 250 MG CAPS     saccharomyces boulardii (FLORASTOR) 250 MG capsule     sertraline (ZOLOFT) 50 MG tablet     spironolactone (ALDACTONE) 50 MG tablet      ustekinumab (STELARA) 90 MG/ML     Vedolizumab (ENTYVIO IV)     Vitamin D3 (CHOLECALCIFEROL) 125 MCG (5000 UT) tablet     No current facility-administered medications for this visit.        Patient Care Team:  Jillian Tejada MD, MD as PCP - General (Internal Medicine)  Poonam Dunbar MD as Assigned Gastroenterology Provider  Nina Escalona [facility] as Pharmacist (Pharmacist Ambulatory Care)  Alexandra Houston RN as Specialty Care Coordinator (Gastroenterology)     Current Health Behaviors  Alcohol use: Endorses intermittent alcohol use with drinking 1-2 drink at a time.   Drug use: Uses CBD for anxiety via tincture  Tobacco use: None  Caffeine use: None   Exercise: Piliates and walking - higher intensity exercises are avoided as she doesn't want to trigger anxiety symptoms  Sleep: The patient did not report difficulty with sleep initiation, sleep maintenance but did endorse early morning waking around 4a, but is able to get back to sleep between 10-45 minutes later.  They estimated sleeping on average 7-8 hours per night, with typically sleeping between 11:30-8:30       Psychiatric History:  Past diagnoses: No formal diagnosis but acknowledges long standing anxiety and history of excessive exercise/calorie tracking (grew out of this)  Current symptoms:   Outpatient treatment: History of brief treatment at [facility] for excessive exercise/calorie tracking in 2013 - not active not, feels interested in eating any food she can tolerate.   Psychotropic medication use: Currently on Sertraline 50 mg prescribed by PCP  Inpatient treatment: None reported  Family history: Maternal uncle has severe OCD; notes many maternal family members have undiagnosed anxiety     Substance Use History:  Reported no alcohol or drug use concerns.     Social History:    Current Living Arrangements: Lives in [place] with a friend from high school    Relationship Status/Family/Children: recently engaged, no previous marriage or  children     Social Support: Good support with friends, fiance, and parents     Developmental History: Raised in [place] with her mother, father. Has a half brother and sister who are about 15 years older. Described her upbringing as great.     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       Psychological Assessment:  General Psychosocial Functioning  The patient completed the following battery of assessments during this psychological evaluation: World Health Organization Disability Assessment Schedule 2.0 12-item (WHODAS), Patient Health Questionnaire-9 (PHQ-9), Generalized Anxiety Disorder-7 screener (JAYDEN-7), and the CAGE Questionnaire Adapted to Include Drugs (CAGE-AID).    The WHODAS measures disability and functional impairment due to health conditions including diseases, illnesses, injuries, mental or emotional problems, and problems with alcohol or drugs. The possible range of scores is 12-60 and higher scores indicate higher levels of disability.     .  WHODAS 2.0 Total Score 3/8/2021   Total Score 17       The PHQ-9 is an instrument for screening, diagnosing, monitoring and measuring the severity of depression. Scores of 5, 10, 15, and 20 represent cutpoints for mild, moderate, moderately severe and severe depression, respectively.     PHQ 3/8/2021   PHQ-9 Total Score 3   Q9: Thoughts of better off dead/self-harm past 2 weeks Not at all       The JAYDEN-7 is an instrument for screening, diagnosing, monitoring and measuring the severity of anxiety. Scores of 5, 10, and 15 represent cutpoints for mild, moderate, and  severe anxiety, respectively.    JAYDEN-7 SCORE 3/8/2021   Total Score 2         The CAGE-AID questionnaire is used to screen for alcohol or drug abuse and dependence in adults. A CAGE-AID score  > 1 is a positive screen, suggesting further discussion is needed to determine if evaluation for alcohol or substance abuse is appropriate. A score > 2 is considered clinically significant, suggesting further evaluation of alcohol or substance-related problems is indicated.  No flowsheet data found.  Not administered due to time constraints.       Mental Status Examination:  Appearance/Behavior/Orientation: Alert and oriented to person, place, time, and situation.     Cooperation/Reliability: Patient was open and cooperative throughout the session.    Speech/Language: Speech was clear, coherent, and of normal rate, rhythm and volume.   Thought Form: Overall logical and organized.   Thought Content: Appropriate to interview and situation.  Cognition/Memory: Not formally assessed, but no difficulties apparent upon interview.   Attention/Concentration: Good throughout interview.    Fund of knowledge: Consistent with age and level of education.    Abstract reasoning: Not assessed.   Judgment: Intact.    Mood/Affect: Mood euthymic; appropriate range of affect.    Insight/Motivation: Good, good  Suicide/Assault: Patient denies suicidal or assaultive ideation, plan, or intent.    Impression:  Xochitl Caputo is a 25 year old female with longstanding anxiety who discusses strong connection between anxiety/stress and GI symptoms. She recently started sertraline 50 mg which was reported to give a good benefit in anxiety reduction. Previously she discussed significant anxiety, chest tension, worry, and difficulty sleeping in the context of her UC flare. She remains interested in psychotherapy as an additional tool for anxiety and chronic illness management.     Diagnosis:  Adjustment disorder with anxious mood  Rule out generalized  anxiety disorder    Recommendation/Plan:  Recommended follow-up with health psychology for mindfulness based cognitive behavioral interventions for anxiety management - strategies to include relaxation training, increasing tolerance of uncertainty, cognitive restructuring, and mindfulness interventions. She agreed with treatment recommendations - provided scheduling information. A treatment plan will be competed at the next session.      Pau Saravia, PhD,   Clinical Health Psychologist    *In accordance with the Rules of the [place] Board of [employer], it is noted that psychological descriptions and scientific procedures underlying psychological evaluations have limitations.  Absolute predictions cannot be made based on information in this report.      This note was completed using Dragon voice recognition software.  Although reviewed after completion, some word and grammatical errors may occur.

## 2021-03-09 RX ORDER — PREDNISONE 5 MG/1
TABLET ORAL
Qty: 90 TABLET | Refills: 0 | Status: CANCELLED | OUTPATIENT
Start: 2021-03-09

## 2021-03-09 ASSESSMENT — ANXIETY QUESTIONNAIRES: GAD7 TOTAL SCORE: 2

## 2021-03-09 NOTE — TELEPHONE ENCOUNTER
predniSONE (DELTASONE) 5 MG tablet      Last Written Prescription Date:  2/12/21  Last Fill Quantity: 90,   # refills: 0  Last Office Visit : 2/23/21 recommended 3 month follow up  Future Office visit:  None scheduled    Routing refill request to provider for review/approval because:  Drug not on gastroenterology refill protocol   Patient reported 2/23/21 not taking       Statement Selected

## 2021-03-10 DIAGNOSIS — K51.90 ULCERATIVE COLITIS (H): ICD-10-CM

## 2021-03-10 NOTE — TELEPHONE ENCOUNTER
Budesonide 2 MG/ACT FOAM      Not on active med list  Last Office Visit : 2/23/21  Future Office visit:  None scheduled  Colonoscopy scheduled for  5/3/21  Routing refill request to provider for review/approval because:  Drug not active on patient's medication list

## 2021-03-25 ENCOUNTER — PATIENT OUTREACH (OUTPATIENT)
Dept: GASTROENTEROLOGY | Facility: CLINIC | Age: 26
End: 2021-03-25

## 2021-03-25 ENCOUNTER — TELEPHONE (OUTPATIENT)
Dept: GASTROENTEROLOGY | Facility: OUTPATIENT CENTER | Age: 26
End: 2021-03-25

## 2021-03-25 DIAGNOSIS — K51.90 ULCERATIVE COLITIS (H): Primary | ICD-10-CM

## 2021-03-25 DIAGNOSIS — K51.90 ULCERATIVE COLITIS (H): ICD-10-CM

## 2021-03-25 LAB
C DIFF TOX B STL QL: NEGATIVE
SPECIMEN SOURCE: NORMAL

## 2021-03-25 PROCEDURE — 83993 ASSAY FOR CALPROTECTIN FECAL: CPT | Performed by: INTERNAL MEDICINE

## 2021-03-25 PROCEDURE — 87493 C DIFF AMPLIFIED PROBE: CPT | Performed by: INTERNAL MEDICINE

## 2021-03-25 RX ORDER — BUDESONIDE 28 MG/1
AEROSOL, FOAM RECTAL
Qty: 1 G | Refills: 0 | Status: SHIPPED | OUTPATIENT
Start: 2021-03-25 | End: 2021-04-22

## 2021-03-25 NOTE — PROGRESS NOTES
Patient symptomatic will need to submit stool samples and sending order for the budesonide foam to the patient pharmacy

## 2021-03-25 NOTE — TELEPHONE ENCOUNTER
Caller:  US CALLING PT TO RESCHEDULE    Reason for cancel?: CX 5/3 DUE TO PROVIDER UNAVAILABLE    Rescheduled?: Y- 5/11    Will patient call back to reschedule?: PATIENT HAS RESCHEDULED.

## 2021-03-26 DIAGNOSIS — K51.90 ULCERATIVE COLITIS (H): ICD-10-CM

## 2021-03-26 LAB — CALPROTECTIN STL-MCNT: 3040 MG/KG (ref 0–49.9)

## 2021-03-26 RX ORDER — BUDESONIDE 28 MG/1
AEROSOL, FOAM RECTAL
OUTPATIENT
Start: 2021-03-26 | End: 2021-04-23

## 2021-03-26 RX ORDER — PREDNISONE 5 MG/1
TABLET ORAL
Qty: 90 TABLET | Refills: 0 | Status: SHIPPED | OUTPATIENT
Start: 2021-03-26 | End: 2021-08-18 | Stop reason: DRUGHIGH

## 2021-03-26 NOTE — PROGRESS NOTES
Spoke to Xochitl this morning in relation to how she is feeling.  She states that about a week prior to her stelara she is noticing breakthrough symptoms.  Patient is having 7-8  stools a day and urgency, some blood. She submitted stool samples and will start 20 mg of prednisone for three days

## 2021-04-08 ENCOUNTER — MYC MEDICAL ADVICE (OUTPATIENT)
Dept: SURGERY | Facility: AMBULATORY SURGERY CENTER | Age: 26
End: 2021-04-08

## 2021-04-08 NOTE — TELEPHONE ENCOUNTER
Called Xochitl and informed her that I would contact the study team in regards to her concerns of wanting to be evaluated for the FMT trial.

## 2021-04-20 DIAGNOSIS — K51.90 ULCERATIVE COLITIS (H): ICD-10-CM

## 2021-04-22 ENCOUNTER — TELEPHONE (OUTPATIENT)
Dept: GASTROENTEROLOGY | Facility: CLINIC | Age: 26
End: 2021-04-22

## 2021-04-22 DIAGNOSIS — K51.90 ULCERATIVE COLITIS (H): ICD-10-CM

## 2021-04-22 RX ORDER — BUDESONIDE 28 MG/1
AEROSOL, FOAM RECTAL
Qty: 1 G | Refills: 0 | Status: SHIPPED | OUTPATIENT
Start: 2021-04-22 | End: 2021-06-03

## 2021-04-22 NOTE — TELEPHONE ENCOUNTER
predniSONE (DELTASONE) 5 MG tablet  Last Written Prescription Date:  3/26/2021  Last Fill Quantity: 90,   # refills: 0  Last Office Visit : 3/8/2021  Future Office visit:  None    Routing refill request to provider for review/approval because:  Drug not on the [facility], [facility] or [facility] refill protocol or controlled substance      Cassy Cox RN  Central Triage Red Flags/Med Refills

## 2021-04-22 NOTE — TELEPHONE ENCOUNTER
[facility] Call Center    Phone Message    May a detailed message be left on voicemail: yes     Reason for Call: Medication Refill Request    Has the patient contacted the pharmacy for the refill? Yes   Name of medication being requested: Budesonide 2 MG/ACT FOAM  Provider who prescribed the medication:   Pharmacy: [facility]/PHARMACY #[id number] [facility] [address] [address]  Date medication is needed: ASAP      Per pt would prefer the medication to be sent to :    [facility] on [address] [place] [address]    If any questions please reach out to pt. Thank you       Action Taken: Message routed to:  [facility] ([facility]): Gastro    Travel Screening: Not Applicable

## 2021-04-27 ENCOUNTER — VIRTUAL VISIT (OUTPATIENT)
Dept: GASTROENTEROLOGY | Facility: CLINIC | Age: 26
End: 2021-04-27
Payer: COMMERCIAL

## 2021-04-27 DIAGNOSIS — F43.22 ADJUSTMENT DISORDER WITH ANXIOUS MOOD: Primary | ICD-10-CM

## 2021-04-27 PROCEDURE — 90837 PSYTX W PT 60 MINUTES: CPT | Mod: GT | Performed by: PSYCHOLOGIST

## 2021-04-27 NOTE — LETTER
Date:July 6, 2021      Provider requested that no letter be sent. Do not send.       [facility]

## 2021-04-27 NOTE — LETTER
4/27/2021         RE: Xochitl Caputo  [address]  [place] [address]        Dear Colleague,    Thank you for referring your patient, Xochitl Caputo, to the [facility] [place]. Please see a copy of my visit note below.    This telehealth service is appropriate and effective for delivering services in light of the necessity for social distancing to mitigate the COVID-19 epidemic and for conservation of PPE.     Patient has agreed to receiving telehealth services after being informed about it: Yes    Patient prefers video invitation/information to be sent by:   email    Mode of transmission: MiSiedo    Location of originating:  Home of the patient    Distance site:  Home office of provider for [facility]    The patient has been notified that:  Video visits will be conducted via a call with their psychologist to provide the care they need with a video conversation. Video visits may be billed at different rates depending on insurance coverage.  Patients are advised to please contact their insurance provider with any questions about their health insurance coverage. If during the course of a call the psychologist feels a video visit is not appropriate, patients will not be charged for this service.        Health Psychology                      Laisha Cueto, Ph.D., L.P [phone]  Maria Alejandra Serrano, Ph.D., L.P. [phone]  Catrachita Pineda, Ph.D. [phone]  Pau Saravia, Ph.D., L.P. [phone]  Ryan Cochran, Ph.D., A.B.P.P., L.P. [phone]         Danii Stephen, Ph.D., L.P. [phone]     [facility] [place], 3rd Floor  [address] [place] [address]    Health Psychology Follow-Up Note    SUBJECTIVE:  Xochitl Caputo is a 25 year old female with UC was seen for individual psychotherapy. Reviewed emotional and physical health since our last session. The patient reported changes since last session include  moving in with her fiance, getting UC flare from the COVID vaccine requiring treatment with prednisone, and benefiting from gut directed hypnosis through [facility].    A treatment plan was completed in collaboration with the patient in this session. Goals include the followin. Improving stress managment, as evidenced by learning to identify good and bad stress and prevent worsening of GI symptoms and anxiety with stress  2. Develop strategies to manage worry related to GI symptoms, as evidenced by developing problem solving strategies and cognitive interventions to mitigate worry  3. Increased ability to focus on the present, especially related to fear of impact on future children.     Provided a rationale for cognitive behavioral interventions and psychoeducation about the course and duration of treatment.  The patient agreed with recommendations. Oriented patient to structure of therapy sessions, including beginning with a brief review of mood, health, and relevant stressors since the last session; working to set an agenda in order to prioritize how we spend time in treatment; taking an active, approach focused on addressing agenda items in session; creating an action plan together in session to facilitate the patient's ability to practice coping strategies discussed in this session in order to help the patient reach their goals in treatment as quickly as possible; and checking in about the helpfulness of that session.      Discussed core features of emotional disorders via unified protocol including tendency to feeling emotions strongly, have an aversive reaction to emotions, and cope with avoidance. She identified with each of these features and we discussed ways she coreen with avoidance. Introduced STOPP skill to improve ability to cope with emotions.    OBJECTIVE:  Appearance/Behavior/Orientation: Alert and oriented to person, place, time, and situation.     Cooperation/Reliability: Patient was open and  cooperative throughout the session.    Speech/Language: Speech was clear, coherent, and of normal rate, rhythm and volume.   Thought Form: Overall logical and organized.   Mood/Affect: Mood anxiety; appropriate range of affect.    Insight/Motivation: Good, good    ASSESSMENT:  Appearing to benefit from treatment.    DIAGNOSIS:  Adjustment disorder with anxious mood  Rule out generalized anxiety disorder    PLAN:  RTC for continued psychotherapy.     Start: 2:11p  Stop: 3:05p  Extended session due to complexity of case and length of interval.    Pau Saravia, PhD,   Clinical Health Psychologist    Tx plan completed: 04/27/21 - reviewed verbally today, will obtain signature at next in person session.   Tx plan due:  04/27/22    *no letter    This note was completed using Dragon voice recognition software.  Although reviewed after completion, some word and grammatical errors may occur.        Again, thank you for allowing me to participate in the care of your patient.        Sincerely,        Pau Saravia, PhD

## 2021-04-27 NOTE — PROGRESS NOTES
This telehealth service is appropriate and effective for delivering services in light of the necessity for social distancing to mitigate the COVID-19 epidemic and for conservation of PPE.     Patient has agreed to receiving telehealth services after being informed about it: Yes    Patient prefers video invitation/information to be sent by:   email    Mode of transmission: [employer]    Location of originating:  Home of the patient    Distance site:  Home office of provider for [facility]    The patient has been notified that:  Video visits will be conducted via a call with their psychologist to provide the care they need with a video conversation. Video visits may be billed at different rates depending on insurance coverage.  Patients are advised to please contact their insurance provider with any questions about their health insurance coverage. If during the course of a call the psychologist feels a video visit is not appropriate, patients will not be charged for this service.        Health Psychology                      Laisha Cueto, Ph.D., L.P [phone]  Maria Alejandra Serrano, Ph.D., L.P. [phone]  Catrachita Pineda, Ph.D. [phone]  Pau Saravia, Ph.D., L.P. [phone]  Ryan Cochran, Ph.D., A.B.P.P., L.P. [phone]         Danii Stephen, Ph.D., L.P. [phone]     [facility], 3rd Floor  [address] [place] [address]    Health Psychology Follow-Up Note    SUBJECTIVE:  Xochitl Caputo is a 25 year old female with UC was seen for individual psychotherapy. Reviewed emotional and physical health since our last session. The patient reported changes since last session include moving in with her fiance, getting UC flare from the COVID vaccine requiring treatment with prednisone, and benefiting from gut directed hypnosis through [employer] eduardo.    A treatment plan was completed in collaboration with the patient in this session. Goals include the followin.  Improving stress managment, as evidenced by learning to identify good and bad stress and prevent worsening of GI symptoms and anxiety with stress  2. Develop strategies to manage worry related to GI symptoms, as evidenced by developing problem solving strategies and cognitive interventions to mitigate worry  3. Increased ability to focus on the present, especially related to fear of impact on future children.     Provided a rationale for cognitive behavioral interventions and psychoeducation about the course and duration of treatment.  The patient agreed with recommendations. Oriented patient to structure of therapy sessions, including beginning with a brief review of mood, health, and relevant stressors since the last session; working to set an agenda in order to prioritize how we spend time in treatment; taking an active, approach focused on addressing agenda items in session; creating an action plan together in session to facilitate the patient's ability to practice coping strategies discussed in this session in order to help the patient reach their goals in treatment as quickly as possible; and checking in about the helpfulness of that session.      Discussed core features of emotional disorders via unified protocol including tendency to feeling emotions strongly, have an aversive reaction to emotions, and cope with avoidance. She identified with each of these features and we discussed ways she coreen with avoidance. Introduced STOPP skill to improve ability to cope with emotions.    OBJECTIVE:  Appearance/Behavior/Orientation: Alert and oriented to person, place, time, and situation.     Cooperation/Reliability: Patient was open and cooperative throughout the session.    Speech/Language: Speech was clear, coherent, and of normal rate, rhythm and volume.   Thought Form: Overall logical and organized.   Mood/Affect: Mood anxiety; appropriate range of affect.    Insight/Motivation: Good,  good    ASSESSMENT:  Appearing to benefit from treatment.    DIAGNOSIS:  Adjustment disorder with anxious mood  Rule out generalized anxiety disorder    PLAN:  RTC for continued psychotherapy.     Start: 2:11p  Stop: 3:05p  Extended session due to complexity of case and length of interval.    Pau Saravia, PhD,   Clinical Health Psychologist    Tx plan completed: 04/27/21 - reviewed verbally today, will obtain signature at next in person session.   Tx plan due:  04/27/22    *no letter    This note was completed using Dragon voice recognition software.  Although reviewed after completion, some word and grammatical errors may occur.

## 2021-04-28 DIAGNOSIS — Z11.59 ENCOUNTER FOR SCREENING FOR OTHER VIRAL DISEASES: ICD-10-CM

## 2021-05-07 DIAGNOSIS — Z11.59 ENCOUNTER FOR SCREENING FOR OTHER VIRAL DISEASES: ICD-10-CM

## 2021-05-07 DIAGNOSIS — K51.90 ULCERATIVE COLITIS (H): ICD-10-CM

## 2021-05-07 LAB
SARS-COV-2 RNA RESP QL NAA+PROBE: NORMAL
SPECIMEN SOURCE: NORMAL

## 2021-05-07 PROCEDURE — U0003 INFECTIOUS AGENT DETECTION BY NUCLEIC ACID (DNA OR RNA); SEVERE ACUTE RESPIRATORY SYNDROME CORONAVIRUS 2 (SARS-COV-2) (CORONAVIRUS DISEASE [COVID-19]), AMPLIFIED PROBE TECHNIQUE, MAKING USE OF HIGH THROUGHPUT TECHNOLOGIES AS DESCRIBED BY CMS-2020-01-R: HCPCS | Performed by: INTERNAL MEDICINE

## 2021-05-07 PROCEDURE — U0005 INFEC AGEN DETEC AMPLI PROBE: HCPCS | Performed by: INTERNAL MEDICINE

## 2021-05-08 LAB
LABORATORY COMMENT REPORT: NORMAL
SARS-COV-2 RNA RESP QL NAA+PROBE: NEGATIVE
SPECIMEN SOURCE: NORMAL

## 2021-05-10 ENCOUNTER — VIRTUAL VISIT (OUTPATIENT)
Dept: GASTROENTEROLOGY | Facility: CLINIC | Age: 26
End: 2021-05-10
Payer: COMMERCIAL

## 2021-05-10 DIAGNOSIS — F43.22 ADJUSTMENT DISORDER WITH ANXIOUS MOOD: Primary | ICD-10-CM

## 2021-05-10 PROCEDURE — 90837 PSYTX W PT 60 MINUTES: CPT | Mod: TEL | Performed by: PSYCHOLOGIST

## 2021-05-10 RX ORDER — BUDESONIDE 28 MG/1
AEROSOL, FOAM RECTAL
Qty: 1 G | Refills: 0 | OUTPATIENT
Start: 2021-05-10 | End: 2021-06-21

## 2021-05-10 RX ORDER — PREDNISONE 5 MG/1
TABLET ORAL
Qty: 90 TABLET | OUTPATIENT
Start: 2021-05-10

## 2021-05-10 NOTE — TELEPHONE ENCOUNTER
[facility] called.  Refill refused.  They were requesting a 90 day supply of medication.  I spoke with pharmacy.  Order was written as X1 Rx with start date of 4/22/2021 and end date of 6/3/2021 and was sent electronically to [facility] pharmacy on 4/22/2021.

## 2021-05-10 NOTE — PROGRESS NOTES
This telehealth service is appropriate and effective for delivering services in light of the necessity for social distancing to mitigate the COVID-19 epidemic and for conservation of PPE.     Patient has agreed to receiving telehealth services after being informed about it: Yes    Patient prefers video invitation/information to be sent by:   email    Mode of transmission: [employer]well - initially tried to use Amwell but transitioned to phone given connection issues    Location of originating:  Home of the patient    Distance site:  Home office of provider for [facility]    The patient has been notified that:  Video visits will be conducted via a call with their psychologist to provide the care they need with a video conversation. Video visits may be billed at different rates depending on insurance coverage.  Patients are advised to please contact their insurance provider with any questions about their health insurance coverage. If during the course of a call the psychologist feels a video visit is not appropriate, patients will not be charged for this service.        Health Psychology                      Laisha Cueto, Ph.D., L.P [phone]  Maria Alejandra Serrano, Ph.D., L.P. [phone]  Catrachita Pineda, Ph.D. [phone]  Pau Saravia, Ph.D., L.P. [phone]  Ryan Cochran, Ph.D., A.B.P.P., L.P. [phone]         Danii Stephen, Ph.D., L.P. [phone]     [facility], 3rd Floor  [address] [place] [address]    Health Psychology Follow-Up Note    SUBJECTIVE:  Xochitl Caputo is a 25 year old female with UC was seen for individual psychotherapy. Reviewed emotional and physical health since our last session. The patient reported ongoing daily practice of gut directed hypnosis via [employer] with benefit. Noted benefit from visualization exercises from [employer]uardo to help manage with anxiety. UC was reported to be well managed, she has colonoscopy scheduled tomorrow which  will determine her ability to participate in an FMT trial which she is very hopeful she can participate in. She expressed experiences with body esteem when she gains weight post flare.  [place] focused on managing anxiety associated with body image concerns.  Explored early experiences with her mother emphasizing appearance and teasing related to weight in elementary school and how these have influenced body esteem.  She experiences comfort in her body at her typical set point of 135 pounds; has lost up to 25 pounds during an active flare and has regained weight to up to 130 pounds.  She notices feeling uncertain about her body image at times especially when others make comments about how good she looks when she has lost weight.  Discussed how factors associated with anxiety about lack of acceptance come into play with weight regain and ways to cope with this anxiety.  Discussed a self compassion exercise in which she can utilize, practiced in session and she noted this would be helpful.  Discussed certain ways of offering reminders of unconditional acceptance outside of body size.    OBJECTIVE:  Appearance/Behavior/Orientation: Alert and oriented to person, place, time, and situation.     Cooperation/Reliability: Patient was open and cooperative throughout the session.    Speech/Language: Speech was clear, coherent, and of normal rate, rhythm and volume.   Thought Form: Overall logical and organized.   Mood/Affect: Mood mildly anxious; appropriate range of affect.    Insight/Motivation: Good, good    ASSESSMENT:  Appearing to benefit from treatment.    DIAGNOSIS:  Adjustment disorder with anxious mood  Rule out generalized anxiety disorder    PLAN:  RTC for continued psychotherapy.     Treatment plan goals include the followin. Improving stress managment, as evidenced by learning to identify good and bad stress and prevent worsening of GI symptoms and anxiety with stress  2. Develop strategies to manage worry  related to GI symptoms, as evidenced by developing problem solving strategies and cognitive interventions to mitigate worry  3. Increased ability to focus on the present, especially related to fear of impact on future children.     Start: 4:06p  Stop: 4:59p  Extended session due to complexity of case and length of interval.    Pau Saravia, PhD,   Clinical Health Psychologist    Tx plan completed: 04/27/21 - reviewed verbally today, will obtain signature at next in person session.   Tx plan due:  04/27/22    *no letter    This note was completed using Dragon voice recognition software.  Although reviewed after completion, some word and grammatical errors may occur.

## 2021-05-10 NOTE — LETTER
Date:July 20, 2021      Provider requested that no letter be sent. Do not send.       [facility]

## 2021-05-10 NOTE — LETTER
5/10/2021         RE: Xochitl Caputo  [address]  [place] [phone]        Dear Colleague,    Thank you for referring your patient, Xochitl Caputo, to the [facility] [place]. Please see a copy of my visit note below.    This telehealth service is appropriate and effective for delivering services in light of the necessity for social distancing to mitigate the COVID-19 epidemic and for conservation of PPE.     Patient has agreed to receiving telehealth services after being informed about it: Yes    Patient prefers video invitation/information to be sent by:   email    Mode of transmission: [employer] - initially tried to use Amwell but transitioned to phone given connection issues    Location of originating:  Home of the patient    Distance site:  Home office of provider for [facility]    The patient has been notified that:  Video visits will be conducted via a call with their psychologist to provide the care they need with a video conversation. Video visits may be billed at different rates depending on insurance coverage.  Patients are advised to please contact their insurance provider with any questions about their health insurance coverage. If during the course of a call the psychologist feels a video visit is not appropriate, patients will not be charged for this service.        Health Psychology                      Laisha Cueto, Ph.D., L.P [phone]  Maria Alejandra Serrano, Ph.D., L.P. [phone]  Catrachita Pineda, Ph.D. [phone]  Pau Saravia, Ph.D., L.P. [phone]  Ryan Cochran, Ph.D., A.B.P.P., L.P. [phone]         Danii Stephen, Ph.D., L.P. [phone]     [facility], 3rd Floor  [address] [place] [address]    Health Psychology Follow-Up Note    SUBJECTIVE:  Xochitl Caputo is a 25 year old female with UC was seen for individual psychotherapy. Reviewed emotional and physical health  since our last session. The patient reported ongoing daily practice of gut directed hypnosis via [employer]ardo with benefit. Noted benefit from visualization exercises from [employer]uardo to help manage with anxiety. UC was reported to be well managed, she has colonoscopy scheduled tomorrow which will determine her ability to participate in an FMT trial which she is very hopeful she can participate in. She expressed experiences with body esteem when she gains weight post flare.  [place] focused on managing anxiety associated with body image concerns.  Explored early experiences with her mother emphasizing appearance and teasing related to weight in elementary school and how these have influenced body esteem.  She experiences comfort in her body at her typical set point of 135 pounds; has lost up to 25 pounds during an active flare and has regained weight to up to 130 pounds.  She notices feeling uncertain about her body image at times especially when others make comments about how good she looks when she has lost weight.  Discussed how factors associated with anxiety about lack of acceptance come into play with weight regain and ways to cope with this anxiety.  Discussed a self compassion exercise in which she can utilize, practiced in session and she noted this would be helpful.  Discussed certain ways of offering reminders of unconditional acceptance outside of body size.    OBJECTIVE:  Appearance/Behavior/Orientation: Alert and oriented to person, place, time, and situation.     Cooperation/Reliability: Patient was open and cooperative throughout the session.    Speech/Language: Speech was clear, coherent, and of normal rate, rhythm and volume.   Thought Form: Overall logical and organized.   Mood/Affect: Mood mildly anxious; appropriate range of affect.    Insight/Motivation: Good, good    ASSESSMENT:  Appearing to benefit from treatment.    DIAGNOSIS:  Adjustment disorder with anxious mood  Rule out generalized anxiety  disorder    PLAN:  RTC for continued psychotherapy.     Treatment plan goals include the followin. Improving stress managment, as evidenced by learning to identify good and bad stress and prevent worsening of GI symptoms and anxiety with stress  2. Develop strategies to manage worry related to GI symptoms, as evidenced by developing problem solving strategies and cognitive interventions to mitigate worry  3. Increased ability to focus on the present, especially related to fear of impact on future children.     Start: 4:06p  Stop: 4:59p  Extended session due to complexity of case and length of interval.    Pau Saravia, PhD,   Clinical Health Psychologist    Tx plan completed: 21 - reviewed verbally today, will obtain signature at next in person session.   Tx plan due:  22    *no letter    This note was completed using Dragon voice recognition software.  Although reviewed after completion, some word and grammatical errors may occur.        Again, thank you for allowing me to participate in the care of your patient.        Sincerely,        Pau Saravia, PhD

## 2021-05-11 ENCOUNTER — HOSPITAL ENCOUNTER (OUTPATIENT)
Facility: AMBULATORY SURGERY CENTER | Age: 26
Discharge: HOME | End: 2021-05-11
Attending: INTERNAL MEDICINE | Admitting: INTERNAL MEDICINE
Payer: COMMERCIAL

## 2021-05-11 VITALS
RESPIRATION RATE: 16 BRPM | OXYGEN SATURATION: 100 % | BODY MASS INDEX: 20.4 KG/M2 | HEART RATE: 90 BPM | DIASTOLIC BLOOD PRESSURE: 74 MMHG | WEIGHT: 130 LBS | HEIGHT: 67 IN | SYSTOLIC BLOOD PRESSURE: 106 MMHG | TEMPERATURE: 97.1 F

## 2021-05-11 LAB
ALBUMIN UR-MCNC: 30 MG/DL
APPEARANCE UR: ABNORMAL
BACTERIA #/AREA URNS HPF: ABNORMAL /HPF
BILIRUB UR QL STRIP: NEGATIVE
COLONOSCOPY: NORMAL
COLOR UR AUTO: YELLOW
CREAT SERPL-MCNC: 0.68 MG/DL (ref 0.52–1.04)
CREAT UR-MCNC: 757 MG/DL
GFR SERPL CREATININE-BSD FRML MDRD: >90 ML/MIN/{1.73_M2}
GLUCOSE UR STRIP-MCNC: NEGATIVE MG/DL
HCG UR QL: NEGATIVE
HGB UR QL STRIP: ABNORMAL
INTERNAL QC OK POCT: YES
KETONES UR STRIP-MCNC: NEGATIVE MG/DL
LEUKOCYTE ESTERASE UR QL STRIP: ABNORMAL
MICROALBUMIN UR-MCNC: 80 MG/L
MICROALBUMIN/CREAT UR: 10.62 MG/G CR (ref 0–25)
MUCOUS THREADS #/AREA URNS LPF: PRESENT /LPF
NITRATE UR QL: NEGATIVE
PH UR STRIP: 6 PH (ref 5–7)
PROT UR-MCNC: 0.41 G/L
PROT/CREAT 24H UR: 0.05 G/G CR (ref 0–0.2)
RBC #/AREA URNS AUTO: 6 /HPF (ref 0–2)
SOURCE: ABNORMAL
SP GR UR STRIP: 1.02 (ref 1–1.03)
SQUAMOUS #/AREA URNS AUTO: 22 /HPF (ref 0–1)
UROBILINOGEN UR STRIP-MCNC: 0 MG/DL (ref 0–2)
WBC #/AREA URNS AUTO: 44 /HPF (ref 0–5)

## 2021-05-11 PROCEDURE — 81025 URINE PREGNANCY TEST: CPT | Performed by: PATHOLOGY

## 2021-05-11 PROCEDURE — 88305 TISSUE EXAM BY PATHOLOGIST: CPT | Mod: GC | Performed by: PATHOLOGY

## 2021-05-11 PROCEDURE — 45380 COLONOSCOPY AND BIOPSY: CPT | Mod: 33

## 2021-05-11 RX ORDER — FENTANYL CITRATE 50 UG/ML
INJECTION, SOLUTION INTRAMUSCULAR; INTRAVENOUS PRN
Status: DISCONTINUED | OUTPATIENT
Start: 2021-05-11 | End: 2021-05-11 | Stop reason: HOSPADM

## 2021-05-11 RX ORDER — NALOXONE HYDROCHLORIDE 0.4 MG/ML
0.2 INJECTION, SOLUTION INTRAMUSCULAR; INTRAVENOUS; SUBCUTANEOUS
Status: CANCELLED | OUTPATIENT
Start: 2021-05-11

## 2021-05-11 RX ORDER — ONDANSETRON 4 MG/1
4 TABLET, ORALLY DISINTEGRATING ORAL EVERY 6 HOURS PRN
Status: CANCELLED | OUTPATIENT
Start: 2021-05-11

## 2021-05-11 RX ORDER — PROCHLORPERAZINE MALEATE 10 MG
10 TABLET ORAL EVERY 6 HOURS PRN
Status: CANCELLED | OUTPATIENT
Start: 2021-05-11

## 2021-05-11 RX ORDER — LIDOCAINE 40 MG/G
CREAM TOPICAL
Status: DISCONTINUED | OUTPATIENT
Start: 2021-05-11 | End: 2021-05-12 | Stop reason: HOSPADM

## 2021-05-11 RX ORDER — FLUMAZENIL 0.1 MG/ML
0.2 INJECTION, SOLUTION INTRAVENOUS
Status: CANCELLED | OUTPATIENT
Start: 2021-05-11 | End: 2021-05-11

## 2021-05-11 RX ORDER — NALOXONE HYDROCHLORIDE 0.4 MG/ML
0.4 INJECTION, SOLUTION INTRAMUSCULAR; INTRAVENOUS; SUBCUTANEOUS
Status: CANCELLED | OUTPATIENT
Start: 2021-05-11

## 2021-05-11 RX ORDER — ONDANSETRON 2 MG/ML
4 INJECTION INTRAMUSCULAR; INTRAVENOUS EVERY 6 HOURS PRN
Status: CANCELLED | OUTPATIENT
Start: 2021-05-11

## 2021-05-11 RX ORDER — ONDANSETRON 2 MG/ML
4 INJECTION INTRAMUSCULAR; INTRAVENOUS
Status: DISCONTINUED | OUTPATIENT
Start: 2021-05-11 | End: 2021-05-12 | Stop reason: HOSPADM

## 2021-05-11 RX ORDER — SODIUM CHLORIDE, SODIUM LACTATE, POTASSIUM CHLORIDE, CALCIUM CHLORIDE 600; 310; 30; 20 MG/100ML; MG/100ML; MG/100ML; MG/100ML
500 INJECTION, SOLUTION INTRAVENOUS CONTINUOUS
Status: DISCONTINUED | OUTPATIENT
Start: 2021-05-11 | End: 2021-05-12 | Stop reason: HOSPADM

## 2021-05-11 ASSESSMENT — MIFFLIN-ST. JEOR: SCORE: 1362.31

## 2021-05-11 NOTE — H&P
Xochitl M Harshal  [id number]  female  25 year old      Reason for procedure/surgery: ulcerative colitis    Patient Active Problem List   Diagnosis     Pain in joint involving ankle and foot     Other postprocedural status(V45.89)     Ulcerative colitis (H)       Past Surgical History:    Past Surgical History:   Procedure Laterality Date     EYE SURGERY       ORTHOPEDIC SURGERY      right ankle.       Past Medical History:   Past Medical History:   Diagnosis Date     Acne      Hypothyroidism      Ulcerative colitis (H)      Ulcerative colitis (H) 10/28/2020       Social History:   Social History     Tobacco Use     Smoking status: Never Smoker     Smokeless tobacco: Never Used   Substance Use Topics     Alcohol use: Not on file       Family History:   Family History   Problem Relation Age of Onset     Colitis Maternal Grandfather      Ulcerative Colitis Maternal Grandfather      Crohn's Disease No family hx of      GERD No family hx of      Stomach Cancer No family hx of        Allergies: No Known Allergies    Active Medications:   Current Outpatient Medications   Medication Sig Dispense Refill     Black Pepper-Turmeric 5-1000 MG CAPS Take 2,000 mg by mouth daily        Budesonide 2 MG/ACT FOAM Place 2 mg rectally 2 times daily for 14 days, THEN 2 mg daily for 28 days. 1 g 0     calcium carbonate (OS-KATIA) 1500 (600 Ca) MG tablet Take 600 mg by mouth daily        cannabidiol (EPIDIOLEX) 100 MG/ML oral solution Take 30 mg by mouth       clindamycin (CLINDAMAX) 1 % external gel APPLY TO FACE TWICE A DAY FOR ACNE.       Ferrous Sulfate 324 (65 Fe) MG TBEC Take 325 mg by mouth daily as needed (if flaring)       HERBALS Metagenix powder ultra inflam x+360 2 scoops daily       levothyroxine (SYNTHROID/LEVOTHROID) 50 MCG tablet Take 50 mcg by mouth       multivitamin w/minerals (THERA-VIT-M) tablet Take 1 tablet by mouth 2 times daily        naltrexone (DEPADE/REVIA) 50 MG tablet Low Dose Naltrexone compounded to 4.5mg.  "Take 1 capsule daily by mouth at bedtime.       Omega-3 Fatty Acids (OMEGA-3 EPA FISH OIL PO) Take 1,640 mg by mouth daily        omeprazole (PRILOSEC) 40 MG DR capsule Take 1 capsule (40 mg) by mouth daily 90 capsule 3     ondansetron (ZOFRAN-ODT) 4 MG ODT tab Take 1 tablet (4 mg) by mouth every 8 hours as needed for nausea (Patient not taking: Reported on 2/23/2021) 10 tablet 0     predniSONE (DELTASONE) 5 MG tablet Take 15 mg (3 tablets )every morning 90 tablet 0     Probiotic Product (VISBIOME PO)        Resveratrol 250 MG CAPS Take 1 capsule by mouth       saccharomyces boulardii (FLORASTOR) 250 MG capsule Take 1 capsule by mouth       sertraline (ZOLOFT) 50 MG tablet        spironolactone (ALDACTONE) 50 MG tablet Take 50 mg by mouth       ustekinumab (STELARA) 90 MG/ML Inject 1 ml ( 90 mg) subcutaneous every 8 weeks.  First injection on 01/20/2021 1 Syringe 5     Vitamin D3 (CHOLECALCIFEROL) 125 MCG (5000 UT) tablet Take 1 tablet by mouth every other day         Systemic Review:   CONSTITUTIONAL: NEGATIVE for fever, chills, change in weight  ENT/MOUTH: NEGATIVE for ear, mouth and throat problems  RESP: NEGATIVE for significant cough or SOB  CV: NEGATIVE for chest pain, palpitations or peripheral edema    Physical Examination:   Vital Signs: /69   Pulse 115   Temp 97.6  F (36.4  C) (Temporal)   Resp 18   Ht 1.702 m (5' 7\")   Wt 59 kg (130 lb)   SpO2 98%   BMI 20.36 kg/m    GENERAL: healthy, alert and no distress  NECK: no adenopathy, no asymmetry, masses, or scars  RESP: lungs clear to auscultation - no rales, rhonchi or wheezes  CV: regular rate and rhythm, normal S1 S2, no S3 or S4, no murmur, click or rub, no peripheral edema and peripheral pulses strong  ABDOMEN: soft, nontender, no hepatosplenomegaly, no masses and bowel sounds normal  MS: no gross musculoskeletal defects noted, no edema    Plan: Appropriate to proceed as scheduled.      Poonam Dunbar MD  5/11/2021    PCP:  Jillian Tejada, " MD

## 2021-05-12 LAB — COPATH REPORT: NORMAL

## 2021-05-19 DIAGNOSIS — K51.90 ULCERATIVE COLITIS (H): ICD-10-CM

## 2021-05-20 NOTE — TELEPHONE ENCOUNTER
predniSONE (DELTASONE) 5 MG tablet    Last Written Prescription Date:  3/26/2021  Last Fill Quantity: 90,   # refills: 0  Last Office Visit : 5/10/2021  Future Office visit:  5/24/2021    Routing refill request to provider for review/approval because:  Drug not on the [facility], [facility] or [facility] refill protocol or controlled substance      Cassy Cox RN  Central Triage Red Flags/Med Refills

## 2021-05-24 ENCOUNTER — VIRTUAL VISIT (OUTPATIENT)
Dept: GASTROENTEROLOGY | Facility: CLINIC | Age: 26
End: 2021-05-24
Payer: COMMERCIAL

## 2021-05-24 DIAGNOSIS — F43.22 ADJUSTMENT DISORDER WITH ANXIOUS MOOD: Primary | ICD-10-CM

## 2021-05-24 NOTE — LETTER
5/24/2021         RE: Xochitl Caputo  [address]  [place] [facility] [address]        Dear Colleague,    Thank you for referring your patient, Xochitl Caputo, to the [facility] [place]. Please see a copy of my visit note below.    Patient was not available on [employer] portal. Called to check in. She explained she had cancelled earlier in the day. Plan is to meet at next scheduled session.     Pau Saravia, PhD,   Clinical Health Psychologist        Again, thank you for allowing me to participate in the care of your patient.        Sincerely,        Pau Saravia, PhD

## 2021-05-24 NOTE — PROGRESS NOTES
Patient was not available on [employer] video portal. Called to check in. She explained she had cancelled earlier in the day. Plan is to meet at next scheduled session.     Pau Saravia, PhD,   Clinical Health Psychologist

## 2021-06-07 ENCOUNTER — VIRTUAL VISIT (OUTPATIENT)
Dept: GASTROENTEROLOGY | Facility: CLINIC | Age: 26
End: 2021-06-07
Payer: COMMERCIAL

## 2021-06-07 DIAGNOSIS — F43.22 ADJUSTMENT DISORDER WITH ANXIOUS MOOD: Primary | ICD-10-CM

## 2021-06-07 PROCEDURE — 90834 PSYTX W PT 45 MINUTES: CPT | Mod: TEL | Performed by: PSYCHOLOGIST

## 2021-06-07 NOTE — LETTER
Date:August 17, 2021      Provider requested that no letter be sent. Do not send.       [facility]

## 2021-06-07 NOTE — PROGRESS NOTES
This telehealth service is appropriate and effective for delivering services in light of the necessity for social distancing to mitigate the COVID-19 epidemic and for conservation of PPE.     Patient has agreed to receiving telehealth services after being informed about it: Yes    Patient prefers video invitation/information to be sent by:   email    Mode of transmission: Amwell - initially tried to use Amwell but transitioned to phone given connection issues    Location of originating:  Home of the patient    Distance site:  Home office of provider for [facility]    The patient has been notified that:  Video visits will be conducted via a call with their psychologist to provide the care they need with a video conversation. Video visits may be billed at different rates depending on insurance coverage.  Patients are advised to please contact their insurance provider with any questions about their health insurance coverage. If during the course of a call the psychologist feels a video visit is not appropriate, patients will not be charged for this service.        Health Psychology                      Laisha Cueto, Ph.D., L.P [phone]  Maria Alejandra Serrano, Ph.D., L.P. [phone]  Catrachita Pineda, Ph.D. [phone]  Pau Saravia, Ph.D., L.P. [phone]  Ryan Cochran, Ph.D., A.B.P.P., L.P. [phone]         Danii Stephen, Ph.D., L.P. [phone]     [facility], 3rd Floor  [address] [place] [address]    Health Psychology Follow-Up Note    SUBJECTIVE:  Xochitl Caputo is a 25 year old female with UC was seen for individual psychotherapy. Reviewed emotional and physical health since our last session. The patient reported significant disappointment over recently being looked over for a job promotion.  She also stated while enrolled in the FMT study she is uncertain if, she is in the active treatment arm, and is looking forward to mid July when she would be  guaranteed active treatment regardless of what she is getting now.  [place] focused on coping following not being chosen for her job promotion.  She explained background and reasons for disappointment as well as how this is affected her, which has increased anxiety.  Discussed ways she is coping with this loss which includes taking time for herself today and making room for emotions.  We discussed emotion focused coping strategies such as allowing emotions, letting herself except and allow her emotional experience and ways to remind herself of resilience and past moments of loss.  She reported benefit from giving permission for her to take as much time as needed and we encouraged caring for emotional and physical self via pleasurable and enjoyable activities.      OBJECTIVE:  Appearance/Behavior/Orientation: Alert and oriented to person, place, time, and situation.     Cooperation/Reliability: Patient was open and cooperative throughout the session.    Speech/Language: Speech was clear, coherent, and of normal rate, rhythm and volume.   Thought Form: Overall logical and organized.   Mood/Affect: Mood mildly anxious; appropriate range of affect.    Insight/Motivation: Good, good    ASSESSMENT:  Appearing to benefit from treatment.    DIAGNOSIS:  Adjustment disorder with anxious mood  Rule out generalized anxiety disorder    PLAN:  RTC for continued psychotherapy.     Treatment plan goals include the followin. Improving stress managment, as evidenced by learning to identify good and bad stress and prevent worsening of GI symptoms and anxiety with stress  2. Develop strategies to manage worry related to GI symptoms, as evidenced by developing problem solving strategies and cognitive interventions to mitigate worry  3. Increased ability to focus on the present, especially related to fear of impact on future children.     Start: 4:11p  Stop: 4:51p  Extended session due to complexity of case and length of  interval.    Pau Saravia, PhD,   Clinical Health Psychologist    Tx plan completed: 04/27/21 - reviewed verbally today, will obtain signature at next in person session.   Tx plan due:  04/27/22    *no letter    This note was completed using Dragon voice recognition software.  Although reviewed after completion, some word and grammatical errors may occur.

## 2021-06-07 NOTE — LETTER
6/7/2021         RE: Xochitl Caputo  [address] [address]        Dear Colleague,    Thank you for referring your patient, Xochitl Caputo, to the [facility] [place]. Please see a copy of my visit note below.    This telehealth service is appropriate and effective for delivering services in light of the necessity for social distancing to mitigate the COVID-19 epidemic and for conservation of PPE.     Patient has agreed to receiving telehealth services after being informed about it: Yes    Patient prefers video invitation/information to be sent by:   email    Mode of transmission: AmCompl[employer] - initially tried to use Amwell but transitioned to phone given connection issues    Location of originating:  Home of the patient    Distance site:  Home office of provider for [facility]    The patient has been notified that:  Video visits will be conducted via a call with their psychologist to provide the care they need with a video conversation. Video visits may be billed at different rates depending on insurance coverage.  Patients are advised to please contact their insurance provider with any questions about their health insurance coverage. If during the course of a call the psychologist feels a video visit is not appropriate, patients will not be charged for this service.        Health Psychology                      Laisha Cueto, Ph.D., L.P [phone]  Maria Alejandra Serrano, Ph.D., L.P. [phone]  Catrachita Pineda, Ph.D. [phone]  Pau Saravia, Ph.D., L.P. [phone]  Ryan Cochran, Ph.D., A.B.P.P., L.P. [phone]         Danii Stephen, Ph.D., L.P. [phone]     [facility] [place], 3rd Floor  [address] [place] [address]   [address]    Health Psychology Follow-Up Note    SUBJECTIVE:  Xochitl Caputo is a 25 year old female with UC was seen for individual psychotherapy. Reviewed emotional and physical health  since our last session. The patient reported significant disappointment over recently being looked over for a job promotion.  She also stated while enrolled in the FMT study she is uncertain if, she is in the active treatment arm, and is looking forward to mid July when she would be guaranteed active treatment regardless of what she is getting now.  [place] focused on coping following not being chosen for her job promotion.  She explained background and reasons for disappointment as well as how this is affected her, which has increased anxiety.  Discussed ways she is coping with this loss which includes taking time for herself today and making room for emotions.  We discussed emotion focused coping strategies such as allowing emotions, letting herself except and allow her emotional experience and ways to remind herself of resilience and past moments of loss.  She reported benefit from giving permission for her to take as much time as needed and we encouraged caring for emotional and physical self via pleasurable and enjoyable activities.      OBJECTIVE:  Appearance/Behavior/Orientation: Alert and oriented to person, place, time, and situation.     Cooperation/Reliability: Patient was open and cooperative throughout the session.    Speech/Language: Speech was clear, coherent, and of normal rate, rhythm and volume.   Thought Form: Overall logical and organized.   Mood/Affect: Mood mildly anxious; appropriate range of affect.    Insight/Motivation: Good, good    ASSESSMENT:  Appearing to benefit from treatment.    DIAGNOSIS:  Adjustment disorder with anxious mood  Rule out generalized anxiety disorder    PLAN:  RTC for continued psychotherapy.     Treatment plan goals include the followin. Improving stress managment, as evidenced by learning to identify good and bad stress and prevent worsening of GI symptoms and anxiety with stress  2. Develop strategies to manage worry related to GI symptoms, as evidenced by  developing problem solving strategies and cognitive interventions to mitigate worry  3. Increased ability to focus on the present, especially related to fear of impact on future children.     Start: 4:11p  Stop: 4:51p  Extended session due to complexity of case and length of interval.    Pau Saravia, PhD,   Clinical Health Psychologist    Tx plan completed: 04/27/21 - reviewed verbally today, will obtain signature at next in person session.   Tx plan due:  04/27/22    *no letter    This note was completed using Dragon voice recognition software.  Although reviewed after completion, some word and grammatical errors may occur.        Again, thank you for allowing me to participate in the care of your patient.        Sincerely,        Pau Saravia, PhD

## 2021-06-18 DIAGNOSIS — K51.90 ULCERATIVE COLITIS (H): ICD-10-CM

## 2021-06-22 NOTE — TELEPHONE ENCOUNTER
predniSONE (DELTASONE) 5 MG tablet      Last Written Prescription Date: 3/26/21  Last Fill Quantity: 90,   # refills: 0  Last Office Visit : 6/7/21  Future Office visit:   none     Routing refill request to provider for review/approval because: not on protocol. Gap in med rf.

## 2021-07-16 RX ORDER — PREDNISONE 5 MG/1
TABLET ORAL
Qty: 90 TABLET | OUTPATIENT
Start: 2021-07-16

## 2021-07-16 RX ORDER — PREDNISONE 5 MG/1
TABLET ORAL
Qty: 90 TABLET | Refills: 0 | OUTPATIENT
Start: 2021-07-16

## 2021-07-21 DIAGNOSIS — K51.90 ULCERATIVE COLITIS (H): ICD-10-CM

## 2021-07-21 RX ORDER — PREDNISONE 5 MG/1
TABLET ORAL
Qty: 90 TABLET | Refills: 0 | Status: CANCELLED | OUTPATIENT
Start: 2021-07-21

## 2021-07-23 NOTE — TELEPHONE ENCOUNTER
Call to [facility] pharmacy after received paper refill request, advised pharmacy prescription has been discontinued. Refill request has been received X 3.  Pharmacist has removed from profile so will not be resent

## 2021-08-17 ENCOUNTER — HOSPITAL ENCOUNTER (EMERGENCY)
Facility: CLINIC | Age: 26
Discharge: HOME | End: 2021-08-18
Attending: EMERGENCY MEDICINE | Admitting: EMERGENCY MEDICINE
Payer: COMMERCIAL

## 2021-08-17 ENCOUNTER — MYC MEDICAL ADVICE (OUTPATIENT)
Dept: GASTROENTEROLOGY | Facility: CLINIC | Age: 26
End: 2021-08-17

## 2021-08-17 ENCOUNTER — LAB (OUTPATIENT)
Dept: LAB | Facility: CLINIC | Age: 26
End: 2021-08-17

## 2021-08-17 ENCOUNTER — DOCUMENTATION ONLY (OUTPATIENT)
Dept: GASTROENTEROLOGY | Facility: CLINIC | Age: 26
End: 2021-08-17

## 2021-08-17 ENCOUNTER — TELEPHONE (OUTPATIENT)
Dept: GASTROENTEROLOGY | Facility: CLINIC | Age: 26
End: 2021-08-17

## 2021-08-17 ENCOUNTER — NURSE TRIAGE (OUTPATIENT)
Dept: NURSING | Facility: CLINIC | Age: 26
End: 2021-08-17

## 2021-08-17 DIAGNOSIS — K51.00 PANCOLITIS (H): ICD-10-CM

## 2021-08-17 DIAGNOSIS — K51.011 ULCERATIVE PANCOLITIS WITH RECTAL BLEEDING (H): ICD-10-CM

## 2021-08-17 DIAGNOSIS — K51.011 ULCERATIVE PANCOLITIS WITH RECTAL BLEEDING (H): Primary | ICD-10-CM

## 2021-08-17 LAB
ALBUMIN SERPL-MCNC: 3.1 G/DL (ref 3.4–5)
ALP SERPL-CCNC: 68 U/L (ref 40–150)
ALT SERPL W P-5'-P-CCNC: 17 U/L (ref 0–50)
ANION GAP SERPL CALCULATED.3IONS-SCNC: 8 MMOL/L (ref 3–14)
AST SERPL W P-5'-P-CCNC: 14 U/L (ref 0–45)
BILIRUB SERPL-MCNC: 0.3 MG/DL (ref 0.2–1.3)
BUN SERPL-MCNC: 9 MG/DL (ref 7–30)
CALCIUM SERPL-MCNC: 8.6 MG/DL (ref 8.5–10.1)
CHLORIDE BLD-SCNC: 102 MMOL/L (ref 94–109)
CO2 SERPL-SCNC: 25 MMOL/L (ref 20–32)
CREAT SERPL-MCNC: 0.69 MG/DL (ref 0.52–1.04)
CRP SERPL-MCNC: 33.8 MG/L (ref 0–8)
ERYTHROCYTE [DISTWIDTH] IN BLOOD BY AUTOMATED COUNT: 17.2 % (ref 10–15)
GFR SERPL CREATININE-BSD FRML MDRD: >90 ML/MIN/1.73M2
GLUCOSE BLD-MCNC: 97 MG/DL (ref 70–99)
HCT VFR BLD AUTO: 37.5 % (ref 35–47)
HGB BLD-MCNC: 12.3 G/DL (ref 11.7–15.7)
LACTATE SERPL-SCNC: 2 MMOL/L (ref 0.7–2)
LIPASE SERPL-CCNC: 67 U/L (ref 73–393)
MCH RBC QN AUTO: 27 PG (ref 26.5–33)
MCHC RBC AUTO-ENTMCNC: 32.8 G/DL (ref 31.5–36.5)
MCV RBC AUTO: 82 FL (ref 78–100)
PLATELET # BLD AUTO: 616 10E3/UL (ref 150–450)
POTASSIUM BLD-SCNC: 3.7 MMOL/L (ref 3.4–5.3)
PROT SERPL-MCNC: 8 G/DL (ref 6.8–8.8)
RBC # BLD AUTO: 4.56 10E6/UL (ref 3.8–5.2)
SODIUM SERPL-SCNC: 135 MMOL/L (ref 133–144)
WBC # BLD AUTO: 14.4 10E3/UL (ref 4–11)

## 2021-08-17 PROCEDURE — 258N000003 HC RX IP 258 OP 636: Performed by: EMERGENCY MEDICINE

## 2021-08-17 PROCEDURE — 250N000011 HC RX IP 250 OP 636: Performed by: EMERGENCY MEDICINE

## 2021-08-17 PROCEDURE — 36415 COLL VENOUS BLD VENIPUNCTURE: CPT | Performed by: EMERGENCY MEDICINE

## 2021-08-17 PROCEDURE — 96361 HYDRATE IV INFUSION ADD-ON: CPT

## 2021-08-17 PROCEDURE — 85652 RBC SED RATE AUTOMATED: CPT | Performed by: EMERGENCY MEDICINE

## 2021-08-17 PROCEDURE — 80053 COMPREHEN METABOLIC PANEL: CPT | Performed by: EMERGENCY MEDICINE

## 2021-08-17 PROCEDURE — 99285 EMERGENCY DEPT VISIT HI MDM: CPT | Mod: 25

## 2021-08-17 PROCEDURE — 96375 TX/PRO/DX INJ NEW DRUG ADDON: CPT

## 2021-08-17 PROCEDURE — 87506 IADNA-DNA/RNA PROBE TQ 6-11: CPT | Performed by: PATHOLOGY

## 2021-08-17 PROCEDURE — 85027 COMPLETE CBC AUTOMATED: CPT | Performed by: EMERGENCY MEDICINE

## 2021-08-17 PROCEDURE — 96374 THER/PROPH/DIAG INJ IV PUSH: CPT | Mod: 59

## 2021-08-17 PROCEDURE — 83690 ASSAY OF LIPASE: CPT | Performed by: EMERGENCY MEDICINE

## 2021-08-17 PROCEDURE — 86140 C-REACTIVE PROTEIN: CPT | Performed by: EMERGENCY MEDICINE

## 2021-08-17 PROCEDURE — 82040 ASSAY OF SERUM ALBUMIN: CPT | Performed by: EMERGENCY MEDICINE

## 2021-08-17 PROCEDURE — 84703 CHORIONIC GONADOTROPIN ASSAY: CPT | Performed by: EMERGENCY MEDICINE

## 2021-08-17 PROCEDURE — 83605 ASSAY OF LACTIC ACID: CPT | Performed by: EMERGENCY MEDICINE

## 2021-08-17 RX ORDER — ONDANSETRON 2 MG/ML
4 INJECTION INTRAMUSCULAR; INTRAVENOUS ONCE
Status: COMPLETED | OUTPATIENT
Start: 2021-08-17 | End: 2021-08-17

## 2021-08-17 RX ORDER — HYDROMORPHONE HYDROCHLORIDE 1 MG/ML
0.5 INJECTION, SOLUTION INTRAMUSCULAR; INTRAVENOUS; SUBCUTANEOUS
Status: DISCONTINUED | OUTPATIENT
Start: 2021-08-17 | End: 2021-08-18 | Stop reason: HOSPADM

## 2021-08-17 RX ORDER — ONDANSETRON 4 MG/1
4 TABLET, ORALLY DISINTEGRATING ORAL EVERY 8 HOURS PRN
Qty: 30 TABLET | Refills: 1 | Status: SHIPPED | OUTPATIENT
Start: 2021-08-17 | End: 2021-12-17

## 2021-08-17 RX ORDER — METHYLPREDNISOLONE SODIUM SUCCINATE 125 MG/2ML
125 INJECTION, POWDER, LYOPHILIZED, FOR SOLUTION INTRAMUSCULAR; INTRAVENOUS ONCE
Status: DISCONTINUED | OUTPATIENT
Start: 2021-08-18 | End: 2021-08-17

## 2021-08-17 RX ORDER — METOCLOPRAMIDE HYDROCHLORIDE 5 MG/ML
10 INJECTION INTRAMUSCULAR; INTRAVENOUS ONCE
Status: COMPLETED | OUTPATIENT
Start: 2021-08-17 | End: 2021-08-17

## 2021-08-17 RX ORDER — DIPHENHYDRAMINE HYDROCHLORIDE 50 MG/ML
25 INJECTION INTRAMUSCULAR; INTRAVENOUS ONCE
Status: COMPLETED | OUTPATIENT
Start: 2021-08-17 | End: 2021-08-17

## 2021-08-17 RX ORDER — PREDNISONE 5 MG/1
TABLET ORAL
Qty: 84 TABLET | Refills: 0 | Status: SHIPPED | OUTPATIENT
Start: 2021-08-17 | End: 2021-09-10

## 2021-08-17 RX ADMIN — DIPHENHYDRAMINE HYDROCHLORIDE 25 MG: 50 INJECTION, SOLUTION INTRAMUSCULAR; INTRAVENOUS at 23:42

## 2021-08-17 RX ADMIN — METOCLOPRAMIDE HYDROCHLORIDE 10 MG: 5 INJECTION INTRAMUSCULAR; INTRAVENOUS at 23:42

## 2021-08-17 RX ADMIN — SODIUM CHLORIDE 1000 ML: 9 INJECTION, SOLUTION INTRAVENOUS at 23:41

## 2021-08-17 RX ADMIN — ONDANSETRON 4 MG: 2 INJECTION INTRAMUSCULAR; INTRAVENOUS at 20:58

## 2021-08-17 ASSESSMENT — ENCOUNTER SYMPTOMS
CHILLS: 1
DIARRHEA: 1
FEVER: 0
CHEST TIGHTNESS: 1
VOMITING: 1
BLOOD IN STOOL: 1
COUGH: 0
NAUSEA: 1
ABDOMINAL PAIN: 1
DIAPHORESIS: 1
SHORTNESS OF BREATH: 1

## 2021-08-17 ASSESSMENT — MIFFLIN-ST. JEOR: SCORE: 1362.31

## 2021-08-17 NOTE — TELEPHONE ENCOUNTER
[facility] Call Center    Phone Message    May a detailed message be left on voicemail: yes     Reason for Call: Symptoms or Concerns     If patient has red-flag symptoms, warm transfer to triage line    Current symptom or concern: UC flare up: nausea, blood in stool, vomited 4-5 times today, diarrhea    Symptoms have been present for:  1 1/2 week(s)    Has patient previously been seen for this? No    Are there any new or worsening symptoms? Yes: increased vomiting and diarrhea, blood in stool     NOTE: Patient requests the following from Gastro team:  Send prednisone to [facility] in [facility] [place] [place] [address] [address]) for ulcerative colitis flare up    Action Taken: Message routed to:  [facility]    Travel Screening: Not Applicable

## 2021-08-17 NOTE — TELEPHONE ENCOUNTER
Clinic Action Needed: Yes, pt requests for below. Please call her back for next steps/updates. Xochitl - [phone].      [facility] Triage Call  Presenting Problem:    Xochitl reports:   - ulcerative colitis flare up. Has nausea. Rectal bleeding x 1.5 weeks, bright red blood mixed with stools. Passes diarrhea at least 10x/day. Toilet water turns red. No dizziness, no weakness.  - Also concerned about prednisone causing her to be moon-faced.    Patient requests the following from Gastro team:  1) Send prednisone to [facility] in [facility] ([address], [place] [address]) for ulcerative colitis flare up    2) on FMT study, asks if she can be placed on Stelera every 4 weeks instead of every 8 weeks OR if Remicade would be a better option. Getting  in October and would like to fell better on that day.    [facility] advised that pt call GI clinic [phone] in case she does not hear back in 2 hours. She verbalized agreement.      Xiomara Deras RN/[facility] Nurse Advisor              Reason for Disposition    MODERATE rectal bleeding (small blood clots, passing blood without stool, or toilet water turns red) more than once a day    Additional Information    Negative: Passed out (i.e., fainted, collapsed and was not responding)    Negative: Shock suspected (e.g., cold/pale/clammy skin, too weak to stand, low BP, rapid pulse)    Negative: Vomiting red blood or black (coffee ground) material    Negative: Sounds like a life-threatening emergency to the triager    Negative: SEVERE rectal bleeding (large blood clots; on and off, or constant bleeding)    Negative: SEVERE dizziness (e.g., unable to stand, requires support to walk, feels like passing out now)    Protocols used: RECTAL BLEEDING-A-OH

## 2021-08-17 NOTE — TELEPHONE ENCOUNTER
Xochitl called in today as she is Xochitl is throwing up. And having increased stools.  She states that she has thrown up  5 times today and has had 10-12 Stools per day 70-80% with blood.  She ss having Slight abdominal pain more nausea then pain     She does have the Chills and sweats do not have a thermometer feels she might have a fever    Open label of day 5 of the study she is on.    She is concerned that the stelara is not working for her.    Spoke to Dr Dunbar and Dr Olvera was also contacted     She will do a c.diff enteric panel and FCP stool testing. Spoke to her about Remicade and she would like to discuss this medication and other options with pharmacist.  Scheduled an appt with her tomorrow with the GI specialty pharmacist.     Script for prednisone sent patient will not start the medication until stool results return

## 2021-08-17 NOTE — PROGRESS NOTES
Called patient back regarding worsening of symptoms. She is on the open label extension of the FMT study. She started FMT capsules on Friday, Aug 13.       She reports having up to 7 or 8 stools a day, 90% with blood.   Unable to tolerate any PO intake. She can tolerate water, but was not able to tolerate a smoothie (emesis).     Discussed possible concerns for infection versus UC flare. FMT capsules are screened for infections, but it is still possible for infection to get through.     Plan:   -- Zofran ODT for nausea/vomiting  -- Pedialyte for hydration  -- Avoid high sugar content foods (e.g. fruits), bland diet - simple carbs and white meats.   -- Stool for infection including C diff and enteric panel  -- Fecal calprotectin  -- Pending infectious work-up, will consider steroids v. Change to infliximab - to be reviewed with Dr. Dunbar.     -Dr. Olvera

## 2021-08-18 ENCOUNTER — APPOINTMENT (OUTPATIENT)
Dept: CT IMAGING | Facility: CLINIC | Age: 26
End: 2021-08-18
Attending: EMERGENCY MEDICINE
Payer: COMMERCIAL

## 2021-08-18 ENCOUNTER — VIRTUAL VISIT (OUTPATIENT)
Dept: PHARMACY | Facility: CLINIC | Age: 26
End: 2021-08-18
Payer: COMMERCIAL

## 2021-08-18 ENCOUNTER — TELEPHONE (OUTPATIENT)
Dept: GASTROENTEROLOGY | Facility: CLINIC | Age: 26
End: 2021-08-18

## 2021-08-18 VITALS
OXYGEN SATURATION: 96 % | DIASTOLIC BLOOD PRESSURE: 65 MMHG | WEIGHT: 130 LBS | TEMPERATURE: 98.6 F | BODY MASS INDEX: 20.4 KG/M2 | RESPIRATION RATE: 18 BRPM | HEIGHT: 67 IN | HEART RATE: 75 BPM | SYSTOLIC BLOOD PRESSURE: 110 MMHG

## 2021-08-18 DIAGNOSIS — K51.911 ULCERATIVE COLITIS WITH RECTAL BLEEDING, UNSPECIFIED LOCATION (H): Primary | ICD-10-CM

## 2021-08-18 LAB
BASOPHILS # BLD MANUAL: 0 10E3/UL (ref 0–0.2)
BASOPHILS NFR BLD MANUAL: 0 %
C COLI+JEJUNI+LARI FUSA STL QL NAA+PROBE: NOT DETECTED
C DIFF TOX B STL QL: NEGATIVE
EC STX1 GENE STL QL NAA+PROBE: NOT DETECTED
EC STX2 GENE STL QL NAA+PROBE: NOT DETECTED
EOSINOPHIL # BLD MANUAL: 0.6 10E3/UL (ref 0–0.7)
EOSINOPHIL NFR BLD MANUAL: 4 %
ERYTHROCYTE [SEDIMENTATION RATE] IN BLOOD BY WESTERGREN METHOD: 37 MM/HR (ref 0–20)
HCG SERPL QL: NEGATIVE
LYMPHOCYTES # BLD MANUAL: 3.5 10E3/UL (ref 0.8–5.3)
LYMPHOCYTES NFR BLD MANUAL: 24 %
MONOCYTES # BLD MANUAL: 1.9 10E3/UL (ref 0–1.3)
MONOCYTES NFR BLD MANUAL: 13 %
NEUTROPHILS # BLD MANUAL: 8.5 10E3/UL (ref 1.6–8.3)
NEUTROPHILS NFR BLD MANUAL: 59 %
NOROV GI+II ORF1-ORF2 JNC STL QL NAA+PR: NOT DETECTED
PATH REV: ABNORMAL
PLAT MORPH BLD: ABNORMAL
RBC MORPH BLD: ABNORMAL
RVA NSP5 STL QL NAA+PROBE: NOT DETECTED
SALMONELLA SP RPOD STL QL NAA+PROBE: NOT DETECTED
SHIGELLA SP+EIEC IPAH STL QL NAA+PROBE: NOT DETECTED
V CHOL+PARA RFBL+TRKH+TNAA STL QL NAA+PR: NOT DETECTED
Y ENTERO RECN STL QL NAA+PROBE: NOT DETECTED

## 2021-08-18 PROCEDURE — 250N000011 HC RX IP 250 OP 636: Performed by: EMERGENCY MEDICINE

## 2021-08-18 PROCEDURE — 99207 PR NO CHARGE LOS: CPT | Performed by: PHARMACIST

## 2021-08-18 PROCEDURE — 96376 TX/PRO/DX INJ SAME DRUG ADON: CPT

## 2021-08-18 PROCEDURE — 96375 TX/PRO/DX INJ NEW DRUG ADDON: CPT

## 2021-08-18 PROCEDURE — 74177 CT ABD & PELVIS W/CONTRAST: CPT

## 2021-08-18 RX ORDER — METOCLOPRAMIDE 10 MG/1
10 TABLET ORAL 3 TIMES DAILY PRN
Qty: 12 TABLET | Refills: 0 | Status: SHIPPED | OUTPATIENT
Start: 2021-08-18 | End: 2021-12-17

## 2021-08-18 RX ORDER — METOCLOPRAMIDE HYDROCHLORIDE 5 MG/ML
10 INJECTION INTRAMUSCULAR; INTRAVENOUS ONCE
Status: COMPLETED | OUTPATIENT
Start: 2021-08-18 | End: 2021-08-18

## 2021-08-18 RX ORDER — IOPAMIDOL 755 MG/ML
60 INJECTION, SOLUTION INTRAVASCULAR ONCE
Status: COMPLETED | OUTPATIENT
Start: 2021-08-18 | End: 2021-08-18

## 2021-08-18 RX ADMIN — HYDROMORPHONE HYDROCHLORIDE 0.5 MG: 1 INJECTION, SOLUTION INTRAMUSCULAR; INTRAVENOUS; SUBCUTANEOUS at 00:18

## 2021-08-18 RX ADMIN — IOPAMIDOL 60 ML: 755 INJECTION, SOLUTION INTRAVENOUS at 01:55

## 2021-08-18 RX ADMIN — METOCLOPRAMIDE HYDROCHLORIDE 10 MG: 5 INJECTION INTRAMUSCULAR; INTRAVENOUS at 02:48

## 2021-08-18 NOTE — ED PROVIDER NOTES
History   Chief Complaint:  Abdominal Pain       History is provided by the patient.    FLACO Caputo is a 25 year old female with history of ulcerative colitis who presents with abdominal pain. She says that for the last week and a half she has been having blood in her diarrhea. She has had around 10 episodes of this and describes it as red and watery. She also has been feeling nauseous around once a day for the last week, but today the nausea has worsened and became constant. She complains of associated vomiting, diaphoresis, chills, chest tightness, and shortness of breath with the nausea. Today she also developed an abdominal cramping. She has tried taking her Zofran with no relief. She denies any cough or fevers. She attempted to contact her GI doctor today but was unsuccessful and came to the ED for relief of her symptoms. She is currently taking Prednisone and last took it this morning but believes she vomited the medication up.    Review of Systems   Constitutional: Positive for chills and diaphoresis. Negative for fever.   Respiratory: Positive for chest tightness and shortness of breath. Negative for cough.    Gastrointestinal: Positive for abdominal pain, blood in stool, diarrhea, nausea and vomiting.   All other systems reviewed and are negative.    Allergies:  The patient has no known allergies.    Medications:  Os-davian  Epidolex  Ferrous sulfate  Synthroid/Levothroid  Thera-vit-m  Depade/Revia  Prilosec  Zofran  Prednisone  Visbiome  Resveratol  Florastor  Zoloft  Aldactone  Stelara  Cholecalciferol    Past Medical History:    Acne  Hypothyroidism  Ulcerative colitis   Muscle hypertonicity  Cervicalgia  C. Difficile infection  ADHD  Dysmenorrhea    Past Surgical History:    Colonoscopy  Eye surgery  Right ankle surgery   Strabismus surgery  Dental surgery    Family History:    Colitis  Ulcerative colitis  Hypertension  Cancer  Hyperlipidemia  Heart disease    Social History:  Patient came  "from home.  Patient is accompanied in the ED.    Physical Exam     Patient Vitals for the past 24 hrs:   BP Temp Temp src Pulse Resp SpO2 Height Weight   08/18/21 0130 105/65 -- -- 77 -- 97 % -- --   08/18/21 0100 100/65 -- -- 84 -- 95 % -- --   08/18/21 0030 113/82 -- -- 82 -- 95 % -- --   08/18/21 0000 114/83 -- -- 73 -- 92 % -- --   08/17/21 2042 104/57 98.6  F (37  C) Oral 102 18 100 % 1.702 m (5' 7\") 59 kg (130 lb)       Physical Exam  General: Patient is awake, alert and interactive when I enter the room. Appears uncomfortable.   Head: The scalp, face, and head appear normal  Eyes: The pupils are equal, round, and reactive to light. Conjunctivae and sclerae are normal  CV: initially tachycardiac   Resp: Lungs are clear without wheezes or rales. No respiratory distress.   GI: Abdomen is soft, no rigidity. No evidence of pulsatile mass. No fluid waves or evidence of ascites. No distension. She is tender to palpation in the epigastrium.    MS: Normal tone. Joints grossly normal without effusions. No asymmetric leg swelling, calf or thigh tenderness.    Skin: No rash or lesions noted. Normal capillary refill noted  Neuro: Speech is normal and fluent. Face is symmetric. Moving all extremities.   Psych:  Normal affect.  Appropriate interactions.    Emergency Department Course     Imaging:    CT Abdomen Pelvis w Contrast  Pancolitis. No bowel obstruction.  Reading per radiology    Laboratory:    CBC: WBC 14.4 (H), HGB 12.3,  (H)     CMP: Albumin 3.1 (L), o/w WNL (Creatinine 0.69)     Lipase: 67 (L)    HCG Qualitative Pregnancy: Negative    CRP Inflammation: 33.8 (H)    Erthrocyte sedimentation rate auto: 37 (H)    Lactic acid (result time 2349) 2.0     Emergency Department Course:    Reviewed:  I reviewed nursing notes, vitals, past medical history and care everywhere    Assessments:  2324 I obtained history and examined the patient as noted above.   0213 I rechecked the patient and explained findings. She " reports that she is feeling significantly better with her nausea and abdominal pain being managed.  0223 I rechecked the patient.    Consults:   0218 I consulted with Dr. Goldstein of [facility] and discussed patient findings and plan of care.    Interventions:  2058 Zofran 4 mg IV  2341 NS 1000 mL IV  2342 Reglan 10 mg IV  2342 Benadryl 25 mg IV  0018 Dilaudid 0.5 mg IV    Disposition:  The patient was discharged to home.       Impression & Plan     Medical Decision Making:  Xochitl Caputo is a 25 year old female with history of ulcerative colitis who presents with abdominal pain.  Patient reports that she has been having numerous episodes of bloody diarrhea for the past week and a half.  She recently underwent a FMT and has been followed closely by her gastroenterologist at the [facility]. She was concerned about dehydration and presented to the emergency department or further evaluation.  Upon initial evaluation she is mildly tachycardic but otherwise hemodynamically stable.  She is afebrile.  Patient did have some tenderness on abdominal exam therefore CT scan was obtained.  This unfortunately shows evidence of pancolitis however there is no acute complication including obstruction or abscess.  Blood work did not reveal any significant electrolyte derangement.  Inflammatory markers and white blood cell count is slightly elevated consistent with a flare of her ulcerative colitis.  I discussed the case with Dr. Goldstein who is on-call for [facility] who recommended holding off on steroids until her C. difficile and enteric panel has returned.  The samples were dropped off earlier today and are currently pending.  If these are negative patient certainly would benefit from steroid burst and close follow-up with her primary GI.  After the aforementioned interventions patient's symptoms had significantly improved and she was able to tolerate p.o.  We discussed discharge home and close follow-up with primary GI.   She is amenable to this plan.  We discussed strict return precautions to return to the emergency department.    Diagnosis:    ICD-10-CM    1. Pancolitis (H)  K51.00        Discharge Medications:  New Prescriptions    METOCLOPRAMIDE (REGLAN) 10 MG TABLET    Take 1 tablet (10 mg) by mouth 3 times daily as needed (vomiting)       Scribe Disclosure:  Olu MERLOS, am serving as a scribe at 11:24 PM on 8/17/2021 to document services personally performed by Vinnie Gomez MD based on my observations and the provider's statements to me.            Vinnie Gomez MD  08/18/21 [id number]

## 2021-08-18 NOTE — PATIENT INSTRUCTIONS
I am sorry to hear you are not feeling well. Please let me know if you would like to pickup our discussion at another time.    Nina ChinD, BCACP  [facility] Pharmacist   [facility]  Phone: [phone]

## 2021-08-18 NOTE — ED TRIAGE NOTES
Patient here with abdominal cramping with nausea vomiting and bloody diarrhea. Hx of ulcerative colitis

## 2021-08-18 NOTE — PROGRESS NOTES
"Clinical Pharmacy Consult:                                                    Xochitl Caputo is a 25 year old female contacted via secure video for a clinical pharmacist consult.  She was referred to me from [facility].    Xochitl is not seen for CMR today due to coverage.    Reason for Consult: Discuss Remicade/other options    Discussion:     Completed med rec given she was just seen in the ED. She is still not feeling well. She just took prednisone 40 mg an hour ago and is feeling very nauseous. Discussed that this could be from the prednisone itself. She called the lab to see when the C diff would be run and they noted this wasn't sent out yet. She could not wait any longer as she is feeling quite miserable. She took prednisone 15 mg yesterday (old dose). She does have ondansetron and metoclopramide on hand and has been using them.     Last dose of Stelara was 8 weeks ago. Per chart notes:  \"Plan:   -- Zofran ODT for nausea/vomiting  -- Pedialyte for hydration  -- Avoid high sugar content foods (e.g. fruits), bland diet - simple carbs and white meats.   -- Stool for infection including C diff and enteric panel  -- Fecal calprotectin  -- Pending infectious work-up, will consider steroids v. Change to infliximab - to be reviewed with Dr. Dunbar. \"      AND     \"Spoke to Dr Dunbar and Dr Olvera was also contacted      She will do a c.diff enteric panel and FCP stool testing. Spoke to her about Remicade and she would like to discuss this medication and other options with pharmacist.  Scheduled an appt with her tomorrow with the GI specialty pharmacist.      Script for prednisone sent patient will not start the medication until stool results return\"    Xochitl wanted to know her other options. I do not see any listed in recent chart notes, but we did review her tried/failed therapies and other medications that are on-label for ulcerative colitis. She has a family friend who notes other health problems started " once they started infliximab and this is part of her concern. She is leaning towards infliximab, and I offered to review this further today. She notes she does not feel she can proceed with the call at this time. The call ended abruptly as Xochitl is not feeling well and did not feel well enough to complete the rest of the call. Encouraged her to contact me if she would like to continue the discussion. She is okay with moving forward with infliximab at this point as she notes she is feeling pretty poorly and wants to feel better prior to her upcoming wedding in October.      Plan:  1. Xochitl to try to take ferrous sulfate and prednisone with a small snack if able to help with gastric irritation   2. Will move forward with Remicade as planned - Xochitl to reach out if she would like to discuss further   -- Discussed with Alexandra [facility] immediately after our call    Post Discharge Medication Reconciliation Status: discharge medications reconciled, continue medications without change.

## 2021-08-18 NOTE — TELEPHONE ENCOUNTER
I called Xochitl to see how she is feeling.   She just woke up from a nap. Reporting bloody stools. Will try to drink Pedialyte now.   Took prednisone 40 mg at noon.     PLAN  ---Continue to try to hydrate orally as much as possible  ---Follow up on stool studies to r/o infxn  ---Xochitl declines a colonoscopy for tomorrow. Will plan for Remicade pending stool test results.     All questions answered. Xochitl was expressed understanding and agreement.

## 2021-08-19 ENCOUNTER — TELEPHONE (OUTPATIENT)
Dept: GASTROENTEROLOGY | Facility: CLINIC | Age: 26
End: 2021-08-19

## 2021-08-19 ENCOUNTER — MYC MEDICAL ADVICE (OUTPATIENT)
Dept: GASTROENTEROLOGY | Facility: CLINIC | Age: 26
End: 2021-08-19

## 2021-08-19 ENCOUNTER — HOSPITAL ENCOUNTER (EMERGENCY)
Facility: CLINIC | Age: 26
Discharge: HOME | End: 2021-08-19
Attending: EMERGENCY MEDICINE | Admitting: EMERGENCY MEDICINE
Payer: COMMERCIAL

## 2021-08-19 ENCOUNTER — PATIENT OUTREACH (OUTPATIENT)
Dept: GASTROENTEROLOGY | Facility: CLINIC | Age: 26
End: 2021-08-19

## 2021-08-19 VITALS
BODY MASS INDEX: 19.73 KG/M2 | HEART RATE: 107 BPM | TEMPERATURE: 97.3 F | RESPIRATION RATE: 16 BRPM | DIASTOLIC BLOOD PRESSURE: 67 MMHG | OXYGEN SATURATION: 97 % | SYSTOLIC BLOOD PRESSURE: 117 MMHG | WEIGHT: 126 LBS

## 2021-08-19 DIAGNOSIS — R11.2 NAUSEA VOMITING AND DIARRHEA: ICD-10-CM

## 2021-08-19 DIAGNOSIS — R19.7 NAUSEA VOMITING AND DIARRHEA: ICD-10-CM

## 2021-08-19 LAB
ALBUMIN SERPL-MCNC: 3 G/DL (ref 3.4–5)
ALP SERPL-CCNC: 62 U/L (ref 40–150)
ALT SERPL W P-5'-P-CCNC: 19 U/L (ref 0–50)
ANION GAP SERPL CALCULATED.3IONS-SCNC: 6 MMOL/L (ref 3–14)
AST SERPL W P-5'-P-CCNC: 16 U/L (ref 0–45)
BASOPHILS # BLD MANUAL: 0 10E3/UL (ref 0–0.2)
BASOPHILS NFR BLD MANUAL: 0 %
BILIRUB SERPL-MCNC: 0.3 MG/DL (ref 0.2–1.3)
BUN SERPL-MCNC: 8 MG/DL (ref 7–30)
CALCIUM SERPL-MCNC: 8.1 MG/DL (ref 8.5–10.1)
CHLORIDE BLD-SCNC: 104 MMOL/L (ref 94–109)
CO2 SERPL-SCNC: 24 MMOL/L (ref 20–32)
CREAT SERPL-MCNC: 0.71 MG/DL (ref 0.52–1.04)
CRP SERPL-MCNC: 12 MG/L (ref 0–8)
EOSINOPHIL # BLD MANUAL: 0 10E3/UL (ref 0–0.7)
EOSINOPHIL NFR BLD MANUAL: 0 %
ERYTHROCYTE [DISTWIDTH] IN BLOOD BY AUTOMATED COUNT: 17.4 % (ref 10–15)
GFR SERPL CREATININE-BSD FRML MDRD: >90 ML/MIN/1.73M2
GLUCOSE BLD-MCNC: 102 MG/DL (ref 70–99)
HCT VFR BLD AUTO: 36.6 % (ref 35–47)
HGB BLD-MCNC: 12 G/DL (ref 11.7–15.7)
HOLD SPECIMEN: NORMAL
LACTATE SERPL-SCNC: 0.9 MMOL/L (ref 0.7–2)
LYMPHOCYTES # BLD MANUAL: 0.6 10E3/UL (ref 0.8–5.3)
LYMPHOCYTES NFR BLD MANUAL: 6 %
MCH RBC QN AUTO: 27.5 PG (ref 26.5–33)
MCHC RBC AUTO-ENTMCNC: 32.8 G/DL (ref 31.5–36.5)
MCV RBC AUTO: 84 FL (ref 78–100)
MONOCYTES # BLD MANUAL: 0.2 10E3/UL (ref 0–1.3)
MONOCYTES NFR BLD MANUAL: 2 %
MYELOCYTES # BLD MANUAL: 0.1 10E3/UL
MYELOCYTES NFR BLD MANUAL: 1 %
NEUTROPHILS # BLD MANUAL: 9.2 10E3/UL (ref 1.6–8.3)
NEUTROPHILS NFR BLD MANUAL: 91 %
PLAT MORPH BLD: ABNORMAL
PLATELET # BLD AUTO: 550 10E3/UL (ref 150–450)
POTASSIUM BLD-SCNC: 4 MMOL/L (ref 3.4–5.3)
PROT SERPL-MCNC: 8 G/DL (ref 6.8–8.8)
RBC # BLD AUTO: 4.36 10E6/UL (ref 3.8–5.2)
RBC MORPH BLD: ABNORMAL
SODIUM SERPL-SCNC: 134 MMOL/L (ref 133–144)
WBC # BLD AUTO: 10.1 10E3/UL (ref 4–11)

## 2021-08-19 PROCEDURE — 83605 ASSAY OF LACTIC ACID: CPT | Performed by: EMERGENCY MEDICINE

## 2021-08-19 PROCEDURE — 96374 THER/PROPH/DIAG INJ IV PUSH: CPT | Performed by: EMERGENCY MEDICINE

## 2021-08-19 PROCEDURE — 99284 EMERGENCY DEPT VISIT MOD MDM: CPT | Mod: 25 | Performed by: EMERGENCY MEDICINE

## 2021-08-19 PROCEDURE — 99284 EMERGENCY DEPT VISIT MOD MDM: CPT | Performed by: EMERGENCY MEDICINE

## 2021-08-19 PROCEDURE — 250N000011 HC RX IP 250 OP 636: Performed by: EMERGENCY MEDICINE

## 2021-08-19 PROCEDURE — 258N000003 HC RX IP 258 OP 636: Performed by: EMERGENCY MEDICINE

## 2021-08-19 PROCEDURE — 85027 COMPLETE CBC AUTOMATED: CPT | Performed by: EMERGENCY MEDICINE

## 2021-08-19 PROCEDURE — 96361 HYDRATE IV INFUSION ADD-ON: CPT | Performed by: EMERGENCY MEDICINE

## 2021-08-19 PROCEDURE — 96375 TX/PRO/DX INJ NEW DRUG ADDON: CPT | Performed by: EMERGENCY MEDICINE

## 2021-08-19 PROCEDURE — 36415 COLL VENOUS BLD VENIPUNCTURE: CPT | Performed by: EMERGENCY MEDICINE

## 2021-08-19 PROCEDURE — 86140 C-REACTIVE PROTEIN: CPT | Performed by: EMERGENCY MEDICINE

## 2021-08-19 PROCEDURE — 80053 COMPREHEN METABOLIC PANEL: CPT | Performed by: EMERGENCY MEDICINE

## 2021-08-19 RX ORDER — MEPERIDINE HYDROCHLORIDE 25 MG/ML
25 INJECTION INTRAMUSCULAR; INTRAVENOUS; SUBCUTANEOUS EVERY 30 MIN PRN
Status: CANCELLED | OUTPATIENT
Start: 2021-08-19

## 2021-08-19 RX ORDER — DIPHENHYDRAMINE HYDROCHLORIDE 50 MG/ML
50 INJECTION INTRAMUSCULAR; INTRAVENOUS
Status: CANCELLED
Start: 2021-08-19

## 2021-08-19 RX ORDER — METHYLPREDNISOLONE SODIUM SUCCINATE 125 MG/2ML
32 INJECTION, POWDER, LYOPHILIZED, FOR SOLUTION INTRAMUSCULAR; INTRAVENOUS ONCE
Status: COMPLETED | OUTPATIENT
Start: 2021-08-19 | End: 2021-08-19

## 2021-08-19 RX ORDER — ONDANSETRON 2 MG/ML
4 INJECTION INTRAMUSCULAR; INTRAVENOUS ONCE
Status: COMPLETED | OUTPATIENT
Start: 2021-08-19 | End: 2021-08-19

## 2021-08-19 RX ORDER — ACETAMINOPHEN 325 MG/1
650 TABLET ORAL ONCE
Status: CANCELLED
Start: 2021-08-19 | End: 2021-08-19

## 2021-08-19 RX ORDER — EPINEPHRINE 1 MG/ML
0.3 INJECTION, SOLUTION, CONCENTRATE INTRAVENOUS EVERY 5 MIN PRN
Status: CANCELLED | OUTPATIENT
Start: 2021-08-19

## 2021-08-19 RX ORDER — NALOXONE HYDROCHLORIDE 0.4 MG/ML
0.2 INJECTION, SOLUTION INTRAMUSCULAR; INTRAVENOUS; SUBCUTANEOUS
Status: CANCELLED | OUTPATIENT
Start: 2021-08-19

## 2021-08-19 RX ORDER — ALBUTEROL SULFATE 0.83 MG/ML
2.5 SOLUTION RESPIRATORY (INHALATION)
Status: CANCELLED | OUTPATIENT
Start: 2021-08-19

## 2021-08-19 RX ORDER — METHYLPREDNISOLONE SODIUM SUCCINATE 125 MG/2ML
125 INJECTION, POWDER, LYOPHILIZED, FOR SOLUTION INTRAMUSCULAR; INTRAVENOUS
Status: CANCELLED
Start: 2021-08-19

## 2021-08-19 RX ORDER — PROCHLORPERAZINE 25 MG
25 SUPPOSITORY, RECTAL RECTAL EVERY 12 HOURS PRN
Qty: 10 SUPPOSITORY | Refills: 0 | Status: SHIPPED | OUTPATIENT
Start: 2021-08-19 | End: 2021-12-17

## 2021-08-19 RX ORDER — ALBUTEROL SULFATE 90 UG/1
1-2 AEROSOL, METERED RESPIRATORY (INHALATION)
Status: CANCELLED
Start: 2021-08-19

## 2021-08-19 RX ORDER — METHYLPREDNISOLONE SODIUM SUCCINATE 125 MG/2ML
125 INJECTION, POWDER, LYOPHILIZED, FOR SOLUTION INTRAMUSCULAR; INTRAVENOUS ONCE
Status: CANCELLED
Start: 2021-08-19 | End: 2021-08-19

## 2021-08-19 RX ORDER — DIPHENHYDRAMINE HCL 25 MG
25 CAPSULE ORAL ONCE
Status: CANCELLED
Start: 2021-08-19 | End: 2021-08-19

## 2021-08-19 RX ADMIN — ONDANSETRON 4 MG: 2 INJECTION INTRAMUSCULAR; INTRAVENOUS at 10:12

## 2021-08-19 RX ADMIN — PROCHLORPERAZINE EDISYLATE 10 MG: 5 INJECTION INTRAMUSCULAR; INTRAVENOUS at 10:11

## 2021-08-19 RX ADMIN — METHYLPREDNISOLONE SODIUM SUCCINATE 31.25 MG: 125 INJECTION, POWDER, FOR SOLUTION INTRAMUSCULAR; INTRAVENOUS at 11:36

## 2021-08-19 RX ADMIN — SODIUM CHLORIDE 1000 ML: 9 INJECTION, SOLUTION INTRAVENOUS at 10:08

## 2021-08-19 ASSESSMENT — ENCOUNTER SYMPTOMS
EYE REDNESS: 0
SHORTNESS OF BREATH: 0
FEVER: 0
ABDOMINAL PAIN: 0
NECK STIFFNESS: 0
VOMITING: 1
NAUSEA: 1
DIARRHEA: 1
HEADACHES: 0
COLOR CHANGE: 0
DIFFICULTY URINATING: 0
ARTHRALGIAS: 0
CONFUSION: 0

## 2021-08-19 NOTE — PROGRESS NOTES
Talked to Xochitl this am and she reports that she had a really hard night.  She was up many times throughout the night throwing up and stool ing.  Patient is unable to keep anything in including water.  Xochitl did throw up her prednisone this am also.  Dr Dunbar contacted and feels the patient should report to the ED for further evaluation  Informed the patient and she will have her mother drive her tot he [place] ED for further evaluation       Orders have been placed in the patient's chart for her to start Remicade

## 2021-08-19 NOTE — ED PROVIDER NOTES
[place] EMERGENCY DEPARTMENT ([facility])  August 19, 2021 Triage A   History     Chief Complaint   Patient presents with     Nausea, Vomiting, & Diarrhea     The history is provided by medical records and the patient.     Xochitl Caputo is a 25 year old female history of ulcerative colitis currently undergoing medical trial who presents with nausea, vomiting, and multiple episodes of bloody diarrheal stools.  She just started taking fecal microbiota transplant study with Dr. Olvera, has been taking some daily capsules for this.  She developed the symptoms 3 days ago, on day 5 of this study.  She stopped taking the capsules but continued to have episodes of nausea, vomiting, and multiple bloody stools. Patient states that she has been having bloody diarrheal stools 10+ times a day.  She went to the ER yesterday, received fluids and medications as well as oral prednisone.  She was discharged to home on prednisone 40 mg but has not been able to keep down her oral prednisone.  She states that she did have some nausea and vomiting prior to study, but symptoms escalated after starting the trial. Has been vomiting 15-20 times in a day, diarrhea x 7 times today so far. No hematemesis but has had bloody diarrhea. When she is actively nauseated starts to sweat, get hot but no documented fevers. Has shaking chills and sweats with nausea and this goes away after vomiting. Last episode of diarrhea was approximately 30 minutes before being seen, while waiting here. Last vomited at 7:30 AM today. Has tried Zofran without improvement. Last took Zofran yesterday. She was prescribed Reglan yesterday with temporary improvement lasting 2 hours after dose. Has generalized weakness, lightheadedness with standing up quickly. Has lost a lot of weight, approximately 7-10 lbs. She has stool urgency but no urinary symptoms. Mother asks if she could get prednisone. No dizziness. No blurred vision. No other symptoms.      Patient states her GI specialist Dr. Dunbar has discussed plans for scoping to evaluate for this. She is under the impression she could get scoped today and that Dr Dunbar is going to try to come down here to see her.     Wt Readings from Last 10 Encounters:   08/19/21 57.2 kg (126 lb)   08/17/21 59 kg (130 lb)   05/11/21 59 kg (130 lb)   02/23/21 53.5 kg (118 lb)   12/02/20 57.2 kg (126 lb)   11/24/20 57.5 kg (126 lb 12.8 oz)   11/05/20 59.9 kg (132 lb)   10/21/20 59 kg (130 lb)     PAST MEDICAL HISTORY:   Past Medical History:   Diagnosis Date     Acne      Hypothyroidism      Ulcerative colitis (H)      Ulcerative colitis (H) 10/28/2020       PAST SURGICAL HISTORY:   Past Surgical History:   Procedure Laterality Date     COLONOSCOPY N/A 5/11/2021    Procedure: COLONOSCOPY, WITH POLYPECTOMY AND BIOPSY;  Surgeon: Poonam Dunbar MD;  Location: [facility] OR     EYE SURGERY       ORTHOPEDIC SURGERY      right ankle.       Past medical history, past surgical history, medications, and allergies were reviewed with the patient. Additional pertinent items: None    FAMILY HISTORY:   Family History   Problem Relation Age of Onset     Colitis Maternal Grandfather      Ulcerative Colitis Maternal Grandfather      Crohn's Disease No family hx of      GERD No family hx of      Stomach Cancer No family hx of        SOCIAL HISTORY:   Social History     Tobacco Use     Smoking status: Never Smoker     Smokeless tobacco: Never Used   Substance Use Topics     Alcohol use: Not on file     Social history was reviewed with the patient. Additional pertinent items: None      Discharge Medication List as of 8/19/2021 11:38 AM      START taking these medications    Details   prochlorperazine (COMPAZINE) 25 MG suppository Place 1 suppository (25 mg) rectally every 12 hours as needed for nausea, Disp-10 suppository, R-0, Local Print         CONTINUE these medications which have NOT CHANGED    Details   Black Pepper-Turmeric 5-1000 MG CAPS  Take 2,000 mg by mouth daily , Historical      calcium carbonate (OS-KATIA) 1500 (600 Ca) MG tablet Take 600 mg by mouth daily , Historical      cannabidiol (EPIDIOLEX) 100 MG/ML oral solution Take 30 mg by mouth, Historical      Ferrous Sulfate 324 (65 Fe) MG TBEC Take 325 mg by mouth daily as needed (if flaring), Historical      HERBALS Metagenix powder ultra inflam x+360 2 scoops daily, Historical      levothyroxine (SYNTHROID/LEVOTHROID) 50 MCG tablet Take 50 mcg by mouth, Historical      metoclopramide (REGLAN) 10 MG tablet Take 1 tablet (10 mg) by mouth 3 times daily as needed (vomiting), Disp-12 tablet, R-0, Local Print      multivitamin w/minerals (THERA-VIT-M) tablet Take 1 tablet by mouth 2 times daily , Historical      Omega-3 Fatty Acids (OMEGA-3 EPA FISH OIL PO) Take 1,640 mg by mouth daily , Historical      omeprazole (PRILOSEC) 40 MG DR capsule Take 1 capsule (40 mg) by mouth daily, Disp-90 capsule, R-3, E-Prescribe      ondansetron (ZOFRAN-ODT) 4 MG ODT tab Take 1 tablet (4 mg) by mouth every 8 hours as needed for nausea or vomiting, Disp-30 tablet, R-1, E-Prescribe      predniSONE (DELTASONE) 5 MG tablet Take 8 tablets (40 mg) by mouth daily for 3 days, THEN 6 tablets (30 mg) daily for 3 days, THEN 4 tablets (20 mg) daily for 3 days, THEN 3 tablets (15 mg) daily for 5 days, THEN 2 tablets (10 mg) daily for 5 days, THEN 1 tablet (5 mg) daily for 5 days. T hen stop, Disp-84 tablet, R-0, E-Prescribe      Probiotic Product (VISBIOME PO) Historical      Resveratrol 250 MG CAPS Take 1 capsule by mouth, Historical      sertraline (ZOLOFT) 50 MG tablet Take 50 mg by mouth daily , Historical      spironolactone (ALDACTONE) 50 MG tablet Take 50 mg by mouth, Historical      ustekinumab (STELARA) 90 MG/ML Inject 1 ml ( 90 mg) subcutaneous every 8 weeks.  First injection on 01/20/2021, Disp-1 Syringe, R-5, E-Prescribe      Vitamin D3 (CHOLECALCIFEROL) 125 MCG (5000 UT) tablet Take 1 tablet by mouth every other  day, Historical              No Known Allergies     Review of Systems   Constitutional: Negative for fever.   HENT: Negative for congestion.    Eyes: Negative for redness.   Respiratory: Negative for shortness of breath.    Cardiovascular: Negative for chest pain.   Gastrointestinal: Positive for diarrhea, nausea and vomiting. Negative for abdominal pain.   Genitourinary: Negative for difficulty urinating.   Musculoskeletal: Negative for arthralgias and neck stiffness.   Skin: Negative for color change.   Neurological: Negative for headaches.   Psychiatric/Behavioral: Negative for confusion.     A complete review of systems was performed with pertinent positives and negatives noted in the HPI, and all other systems negative.    Physical Exam   BP: 117/67  Pulse: 107  Temp: 97.3  F (36.3  C)  Resp: 16  Weight: 57.2 kg (126 lb)  SpO2: 97 %      Physical Exam  Constitutional:       General: She is not in acute distress.     Appearance: She is not diaphoretic.   HENT:      Head: Atraumatic.      Mouth/Throat:      Pharynx: No oropharyngeal exudate.   Eyes:      General: No scleral icterus.     Pupils: Pupils are equal, round, and reactive to light.   Cardiovascular:      Heart sounds: Normal heart sounds.   Pulmonary:      Effort: No respiratory distress.      Breath sounds: Normal breath sounds.   Abdominal:      General: Bowel sounds are normal.      Palpations: Abdomen is soft.      Tenderness: There is no abdominal tenderness.   Musculoskeletal:         General: No tenderness.   Skin:     General: Skin is warm.      Findings: No rash.         ED Course        Procedures          The medical record was reviewed and interpreted.  Current labs reviewed and interpreted.  Previous labs reviewed and interpreted.                 Results for orders placed or performed during the hospital encounter of 08/19/21 (from the past 24 hour(s))   Comprehensive metabolic panel   Result Value Ref Range    Sodium 134 133 - 144 mmol/L     Potassium 4.0 3.4 - 5.3 mmol/L    Chloride 104 94 - 109 mmol/L    Carbon Dioxide (CO2) 24 20 - 32 mmol/L    Anion Gap 6 3 - 14 mmol/L    Urea Nitrogen 8 7 - 30 mg/dL    Creatinine 0.71 0.52 - 1.04 mg/dL    Calcium 8.1 (L) 8.5 - 10.1 mg/dL    Glucose 102 (H) 70 - 99 mg/dL    Alkaline Phosphatase 62 40 - 150 U/L    AST 16 0 - 45 U/L    ALT 19 0 - 50 U/L    Protein Total 8.0 6.8 - 8.8 g/dL    Albumin 3.0 (L) 3.4 - 5.0 g/dL    Bilirubin Total 0.3 0.2 - 1.3 mg/dL    GFR Estimate >90 >60 mL/min/1.73m2   CBC with platelets differential    Narrative    The following orders were created for panel order CBC with platelets differential.  Procedure                               Abnormality         Status                     ---------                               -----------         ------                     CBC with platelets and d...[[id number]]  Abnormal            Final result               Manual Differential[[id number]]          Abnormal            Final result                 Please view results for these tests on the individual orders.   Lactic acid whole blood STAT   Result Value Ref Range    Lactic Acid 0.9 0.7 - 2.0 mmol/L   CBC with platelets and differential   Result Value Ref Range    WBC Count 10.1 4.0 - 11.0 10e3/uL    RBC Count 4.36 3.80 - 5.20 10e6/uL    Hemoglobin 12.0 11.7 - 15.7 g/dL    Hematocrit 36.6 35.0 - 47.0 %    MCV 84 78 - 100 fL    MCH 27.5 26.5 - 33.0 pg    MCHC 32.8 31.5 - 36.5 g/dL    RDW 17.4 (H) 10.0 - 15.0 %    Platelet Count 550 (H) 150 - 450 10e3/uL   [place] Draw    Narrative    The following orders were created for panel order [place] Draw.  Procedure                               Abnormality         Status                     ---------                               -----------         ------                     Extra Red Top Tube[[id number]]                               Final result               Extra Purple Top Tube[[id number]]                            Final result                  Please view results for these tests on the individual orders.   Extra Red Top Tube   Result Value Ref Range    Hold Specimen JIC    Extra Purple Top Tube   Result Value Ref Range    Hold Specimen JIC    CRP inflammation   Result Value Ref Range    CRP Inflammation 12.0 (H) 0.0 - 8.0 mg/L   Manual Differential   Result Value Ref Range    % Neutrophils 91 %    % Lymphocytes 6 %    % Monocytes 2 %    % Eosinophils 0 %    % Basophils 0 %    % Myelocytes 1 %    Absolute Neutrophils 9.2 (H) 1.6 - 8.3 10e3/uL    Absolute Lymphocytes 0.6 (L) 0.8 - 5.3 10e3/uL    Absolute Monocytes 0.2 0.0 - 1.3 10e3/uL    Absolute Eosinophils 0.0 0.0 - 0.7 10e3/uL    Absolute Basophils 0.0 0.0 - 0.2 10e3/uL    Absolute Myelocytes 0.1 (H) <=0.0 10e3/uL    RBC Morphology Confirmed RBC Indices     Platelet Assessment  Automated Count Confirmed. Platelet morphology is normal.     Automated Count Confirmed. Platelet morphology is normal.   Extra Tube    Narrative    The following orders were created for panel order Extra Tube.  Procedure                               Abnormality         Status                     ---------                               -----------         ------                     Extra Blue Top Tube[[id number]]                              Final result                 Please view results for these tests on the individual orders.   Extra Blue Top Tube   Result Value Ref Range    Hold Specimen JIC      Medications   0.9% sodium chloride BOLUS (has no administration in time range)   0.9% sodium chloride BOLUS (0 mLs Intravenous Stopped 8/19/21 1136)   ondansetron (ZOFRAN) injection 4 mg (4 mg Intravenous Given 8/19/21 1012)   prochlorperazine (COMPAZINE) injection 10 mg (10 mg Intravenous Given 8/19/21 1011)   methylPREDNISolone sodium succinate (solu-MEDROL) injection 31.25 mg (31.25 mg Intravenous Given 8/19/21 1136)           10:47 AM   Spoke with Dr. Poonam Dunbar who requests that patient be admitted to  medicine    Assessments & Plan (with Medical Decision Making)   25-year-old female with history of ulcerative colitis who presents with a recurrence of nausea, vomiting and diarrhea.  Differential includes ulcerative colitis, infectious etiology, viral gastroenteritis, medication side effect, withdrawal, electrolyte abnormality.  Exam revealed mild diffuse tenderness without rebound or guarding.  Laboratories were obtained including CBC revealed elevated platelet count of 550.  Comprehensive metabolic panel was grossly normal with exception of slight decrease in albumin.  Quantitative hCG was negative.  Lactic acid was 0.9.  Patient received to Compazine, Zofran, Solu-Medrol and IV fluids and felt improved.  The case was discussed with her gastroenterologist Dr. Dunbar who recommended admission, however, patient (and patient's mother) declined stating that she would not wait to be admitted and that she simply wanted her IV steroids and a prescription for Compazine and would follow-up later with her gastroenterologist.      I have reviewed the nursing notes.    I have reviewed the findings, diagnosis, plan and need for follow up with the patient.    Discharge Medication List as of 8/19/2021 11:38 AM      START taking these medications    Details   prochlorperazine (COMPAZINE) 25 MG suppository Place 1 suppository (25 mg) rectally every 12 hours as needed for nausea, Disp-10 suppository, R-0, Local Print             Final diagnoses:   Nausea vomiting and diarrhea       I, Alea Davis, am serving as a trained medical scribe to document services personally performed by Israel Betancourt MD based on the provider's statements to me on August 19, 2021.  This document has been checked and approved by the attending provider.    I, Israel Betancourt MD, was physically present and have reviewed and verified the accuracy of this note documented by Alea Davis, medical scribe.      Israel Betancourt MD     8/19/2021   [facility]  [facility] EMERGENCY DEPARTMENT     Israel Betancourt MD  08/19/21 0161

## 2021-08-19 NOTE — ED TRIAGE NOTES
Pt has h/o UC in due to N/V/D and reports of bloody stools. Pt is in trial for UC and states it began on 5th day of trail. Pt reffered by Dr. Ragland.

## 2021-08-19 NOTE — TELEPHONE ENCOUNTER
----- Message from Clayton Garza sent at 8/19/2021 11:31 AM CDT -----  Regarding: RE: urgent Remicade start  Ok thank you, Can you also have someone update the therapy plan and I will start the PA process.   ----- Message -----  From: Alexandra Houston RN  Sent: 8/19/2021  11:28 AM CDT  To: Nina Escalona RPH, Clayton Garza, #  Subject: RE: urgent Remicade start                        yes  ----- Message -----  From: Clayton Garza  Sent: 8/19/2021  11:22 AM CDT  To: Nina Escalona RPH, Alexandra Houston RN, #  Subject: RE: urgent Remicade start                        Good morning,     It seems that Inflectra is the preferred product per [facility] insurance. Would it be ok to submit a PA for the Infectra instead of the Remicade?     Thank you,   Clayton Garza,    - Infusion Therapy  Office: [phone]  Fax: [phone]    ----- Message -----  From: Alexandra Houston RN  Sent: 8/19/2021   8:10 AM CDT  To: Nina Escalona RPH, Alexandra Houston RN, #  Subject: urgent Remicade start                            Hello,      Can you please obtain an urgent  PA for her to start Remicade.  She is going to the ED today and there is a chance she could be admitted today but we will want to start as soon as she is released. [place] [facility] [place]    I know that she has [facility] but her plan allowed her to receive the stelara infusion with us this year so I am hoping she can at least go through our load with us.     Thanks for everything    Alexandra

## 2021-08-19 NOTE — TELEPHONE ENCOUNTER
I called Xochitl to see how she is doing.    She went to the [facility] ED earlier today and received Compazine and IV solumedrol, 32 mg.   She reports feeling better, tolerating bone broth and soft food.     Labs reviewed and improved from previous (CRP 12, down from 33.8 on 8/17). No anemia. Plt 550.    Plan:  ---Continue prednisone 40 mg daily  ---Schedule inflectra asap  ---Continue to monitor closely     All questions answered. Xochitl expressed understanding and agreement.

## 2021-08-23 ENCOUNTER — PATIENT OUTREACH (OUTPATIENT)
Dept: GASTROENTEROLOGY | Facility: CLINIC | Age: 26
End: 2021-08-23

## 2021-08-26 ENCOUNTER — TELEPHONE (OUTPATIENT)
Dept: GASTROENTEROLOGY | Facility: CLINIC | Age: 26
End: 2021-08-26

## 2021-08-26 NOTE — PROGRESS NOTES
Spoke to Xochitl on Monday she states that she is feeling much better after the weekend, she was able to sleep al night Sunday night and had only two bowel movements prior to speaking with me at 10 am.  Bowel movements did not have blood in them. abdominal pain had decreased and had been able to eat.  Informed her that the PA for the Remicade was still in process.

## 2021-08-26 NOTE — TELEPHONE ENCOUNTER
I contacted [facility] given the urgency in determination. This is still pending per Sharifa. I connected with the infusion finance team yesterday afternoon and this was also escalated to [facility] then. Sharifa tells me this is still under review and is marked as high priority. She asks that we give them time for review. Not a promise - but hoping for a determination this afternoon. They have Clayton on file as a contact when determination is made (from the infusion finance team).

## 2021-08-26 NOTE — TELEPHONE ENCOUNTER
----- Message from Clayton Garza sent at 8/26/2021 12:45 PM CDT -----  Regarding: RE: urgent Remicade start  Good afternoon,     This is now approved and ready to go.     Thank you,   Clayton Garza    - Infusion Therapy  Office: [phone]  Fax: [phone]    ----- Message -----  From: Clayton Garza  Sent: 8/24/2021  11:13 AM CDT  To: Nina Escalona [facility], lAexandra Houston, RN, #  Subject: RE: urgent Remicade start                        Hello,     I have used what you suggested.     Thank you,   Clayton Garza    - Infusion Therapy  Office: [phone]  Fax: [phone]    ----- Message -----  From: Nina Escalona [facility]  Sent: 8/24/2021  10:43 AM CDT  To: Alexandra Mayorga, RN, #  Subject: RE: urgent Remicade start                        Rex Arnold,    Can you use ED visit notes x2, documentation only from Dr. Olvera on 8/17 and 8/19 telephone from Dr. Dunbar?     Nina   ----- Message -----  From: Clayton Garza  Sent: 8/24/2021  10:23 AM CDT  To: Nina Escalona [facility], Alexandra Houston, RN, #  Subject: FW: urgent Remicade start                        Hello,     I just received a message from the insurance and they need progress notes from the provider and a medication list faxed to them. I can fax it to them but is there a way the provider can put some notes in her chart or write a letter of medical necessity stating why Xochitl needs this? I only see notes in there from the [facility].       Thank you,   Clayton Garza    - Infusion Therapy  Office: [phone]  Fax: [phone]    ----- Message -----  From: Clayton Garza  Sent: 8/23/2021   1:19 PM CDT  To: Nina Escalona [facility], Alexandra Houston RN, #  Subject: RE: urgent Remicade start                        Hello,     I just called and this is still under review but was assigned to someone today. I told them we need a determination Urgently. They said it  shouldn't take much longer.   .  Thank you,   Clayton Garza    - Infusion Therapy  Office: [phone]  Fax: [phone]    ----- Message -----  From: Alexandra Houston RN  Sent: 8/23/2021  10:53 AM CDT  To: Nina Escalona RPH, Clayton Garza, #  Subject: RE: urgent Remicade start                        Hello,    Have you heard any news on this urgent request?    Thanks    Alexandra  ----- Message -----  From: Clayton Garza  Sent: 8/20/2021   4:57 PM CDT  To: Nina Escalona RPH, Alexandra Houston RN, #  Subject: RE: urgent Remicade start                        Hello,     This is still pending.     Thank you,   Clayton Garza    - Infusion Therapy  Office: [phone]  Fax: [phone]    ----- Message -----  From: Alexandra Houston RN  Sent: 8/20/2021   4:49 PM CDT  To: Nina Escalona RPH, Clayton Garza, #  Subject: RE: urgent Remicade start                        Hello,    Was there any determination on this?    Alexandra  ----- Message -----  From: Clayton Garza  Sent: 8/19/2021  11:46 AM CDT  To: Nina Escalona RPH, Alexandra Houston RN, #  Subject: RE: urgent Remicade start                        Hello,     Ok thank you. The Pa is initiated.     Thank you,   Clayton Garza    - Infusion Therapy  Office: [phone]  Fax: [phone]    ----- Message -----  From: Alexandra Houston RN  Sent: 8/19/2021  11:41 AM CDT  To: Nina Escalona RPH, Clayton Garza, #  Subject: RE: urgent Remicade start                        The therapy plan is updated    Alexandra  ----- Message -----  From: Clayton Garza  Sent: 8/19/2021  11:31 AM CDT  To: Nina Escalona [facility], Alexandra Houston RN, #  Subject: RE: urgent Remicade start                        Ok thank you, Can you also have someone update the therapy plan and I will start the PA process.   ----- Message -----  From: Alexandra Houston RN  Sent: 8/19/2021  11:28 AM CDT  To:  Nina Escalona RPH, Clayton Garza, #  Subject: RE: urgent Remicade start                        yes  ----- Message -----  From: Clayton Garza  Sent: 8/19/2021  11:22 AM CDT  To: Nina Escalona RPH, Alexandra Houston RN, #  Subject: RE: urgent Remicade start                        Good morning,     It seems that Inflectra is the preferred product per [facility] insurance. Would it be ok to submit a PA for the Infectra instead of the Remicade?     Thank you,   Clayton Garza,    - Infusion Therapy  Office: [phone]  Fax: [phone]    ----- Message -----  From: Alexandra Houston RN  Sent: 8/19/2021   8:10 AM CDT  To: Nina Escalona RPH, Alexandra Houston RN, #  Subject: urgent Remicade start                            Hello,      Can you please obtain an urgent  PA for her to start Remicade.  She is going to the ED today and there is a chance she could be admitted today but we will want to start as soon as she is released. Want to infuse [place] the [facility] [place].    I know that she has [facility] but her plan allowed her to receive the stelara infusion with us this year so I am hoping she can at least go through our load with us.     Thanks for everything    Alexandra

## 2021-08-26 NOTE — TELEPHONE ENCOUNTER
Called Xochitl and informed her that the IFX is approved through insurance will call the infusion center tomorrow and see when she ca have the first infusion.

## 2021-08-27 ENCOUNTER — PATIENT OUTREACH (OUTPATIENT)
Dept: GASTROENTEROLOGY | Facility: CLINIC | Age: 26
End: 2021-08-27

## 2021-08-27 DIAGNOSIS — Z20.822 ENCOUNTER FOR LABORATORY TESTING FOR COVID-19 VIRUS: Primary | ICD-10-CM

## 2021-08-27 NOTE — PROGRESS NOTES
Call made to the infusion center to schedule this infusion they will call with a time next week    Called and updated Xochitl via voicemail

## 2021-08-27 NOTE — PROGRESS NOTES
Called Xochitl and gave her the dates and times of her Remicade infusions. Left my direct number for call back

## 2021-08-28 ENCOUNTER — LAB (OUTPATIENT)
Dept: URGENT CARE | Facility: URGENT CARE | Age: 26
End: 2021-08-28
Attending: INTERNAL MEDICINE
Payer: COMMERCIAL

## 2021-08-28 DIAGNOSIS — Z20.822 ENCOUNTER FOR LABORATORY TESTING FOR COVID-19 VIRUS: ICD-10-CM

## 2021-08-28 PROCEDURE — U0005 INFEC AGEN DETEC AMPLI PROBE: HCPCS

## 2021-08-28 PROCEDURE — U0003 INFECTIOUS AGENT DETECTION BY NUCLEIC ACID (DNA OR RNA); SEVERE ACUTE RESPIRATORY SYNDROME CORONAVIRUS 2 (SARS-COV-2) (CORONAVIRUS DISEASE [COVID-19]), AMPLIFIED PROBE TECHNIQUE, MAKING USE OF HIGH THROUGHPUT TECHNOLOGIES AS DESCRIBED BY CMS-2020-01-R: HCPCS

## 2021-08-29 LAB — SARS-COV-2 RNA RESP QL NAA+PROBE: NEGATIVE

## 2021-08-30 ENCOUNTER — INFUSION THERAPY VISIT (OUTPATIENT)
Dept: INFUSION THERAPY | Facility: CLINIC | Age: 26
End: 2021-08-30
Attending: INTERNAL MEDICINE
Payer: COMMERCIAL

## 2021-08-30 VITALS
WEIGHT: 132.6 LBS | OXYGEN SATURATION: 100 % | DIASTOLIC BLOOD PRESSURE: 61 MMHG | HEART RATE: 85 BPM | SYSTOLIC BLOOD PRESSURE: 101 MMHG | BODY MASS INDEX: 20.77 KG/M2 | RESPIRATION RATE: 16 BRPM | TEMPERATURE: 97.7 F

## 2021-08-30 DIAGNOSIS — K51.911 ULCERATIVE COLITIS WITH RECTAL BLEEDING, UNSPECIFIED LOCATION (H): Primary | ICD-10-CM

## 2021-08-30 LAB
ALBUMIN SERPL-MCNC: 3.2 G/DL (ref 3.4–5)
ALP SERPL-CCNC: 41 U/L (ref 40–150)
ALT SERPL W P-5'-P-CCNC: 15 U/L (ref 0–50)
AST SERPL W P-5'-P-CCNC: 10 U/L (ref 0–45)
BASOPHILS # BLD AUTO: 0.1 10E3/UL (ref 0–0.2)
BASOPHILS NFR BLD AUTO: 1 %
BILIRUB DIRECT SERPL-MCNC: <0.1 MG/DL (ref 0–0.2)
BILIRUB SERPL-MCNC: 0.2 MG/DL (ref 0.2–1.3)
CRP SERPL-MCNC: <2.9 MG/L (ref 0–8)
EOSINOPHIL # BLD AUTO: 0.2 10E3/UL (ref 0–0.7)
EOSINOPHIL NFR BLD AUTO: 2 %
ERYTHROCYTE [DISTWIDTH] IN BLOOD BY AUTOMATED COUNT: 16.8 % (ref 10–15)
ERYTHROCYTE [SEDIMENTATION RATE] IN BLOOD BY WESTERGREN METHOD: 15 MM/HR (ref 0–20)
HCT VFR BLD AUTO: 33.7 % (ref 35–47)
HGB BLD-MCNC: 10.8 G/DL (ref 11.7–15.7)
IMM GRANULOCYTES # BLD: 0 10E3/UL
IMM GRANULOCYTES NFR BLD: 0 %
LYMPHOCYTES # BLD AUTO: 5.2 10E3/UL (ref 0.8–5.3)
LYMPHOCYTES NFR BLD AUTO: 43 %
MCH RBC QN AUTO: 27.5 PG (ref 26.5–33)
MCHC RBC AUTO-ENTMCNC: 32 G/DL (ref 31.5–36.5)
MCV RBC AUTO: 86 FL (ref 78–100)
MONOCYTES # BLD AUTO: 1 10E3/UL (ref 0–1.3)
MONOCYTES NFR BLD AUTO: 8 %
NEUTROPHILS # BLD AUTO: 5.5 10E3/UL (ref 1.6–8.3)
NEUTROPHILS NFR BLD AUTO: 46 %
NRBC # BLD AUTO: 0 10E3/UL
NRBC BLD AUTO-RTO: 0 /100
PLAT MORPH BLD: NORMAL
PLATELET # BLD AUTO: 438 10E3/UL (ref 150–450)
PROT SERPL-MCNC: 7 G/DL (ref 6.8–8.8)
RBC # BLD AUTO: 3.93 10E6/UL (ref 3.8–5.2)
RBC MORPH BLD: NORMAL
WBC # BLD AUTO: 12.1 10E3/UL (ref 4–11)

## 2021-08-30 PROCEDURE — 96366 THER/PROPH/DIAG IV INF ADDON: CPT

## 2021-08-30 PROCEDURE — 85652 RBC SED RATE AUTOMATED: CPT | Performed by: INTERNAL MEDICINE

## 2021-08-30 PROCEDURE — 258N000003 HC RX IP 258 OP 636: Performed by: INTERNAL MEDICINE

## 2021-08-30 PROCEDURE — 86140 C-REACTIVE PROTEIN: CPT | Performed by: INTERNAL MEDICINE

## 2021-08-30 PROCEDURE — 250N000011 HC RX IP 250 OP 636: Performed by: INTERNAL MEDICINE

## 2021-08-30 PROCEDURE — 85025 COMPLETE CBC W/AUTO DIFF WBC: CPT | Performed by: INTERNAL MEDICINE

## 2021-08-30 PROCEDURE — 82040 ASSAY OF SERUM ALBUMIN: CPT | Performed by: INTERNAL MEDICINE

## 2021-08-30 PROCEDURE — 96365 THER/PROPH/DIAG IV INF INIT: CPT

## 2021-08-30 PROCEDURE — 36415 COLL VENOUS BLD VENIPUNCTURE: CPT | Performed by: INTERNAL MEDICINE

## 2021-08-30 RX ORDER — NALOXONE HYDROCHLORIDE 0.4 MG/ML
0.2 INJECTION, SOLUTION INTRAMUSCULAR; INTRAVENOUS; SUBCUTANEOUS
Status: CANCELLED | OUTPATIENT
Start: 2021-09-13

## 2021-08-30 RX ORDER — ALBUTEROL SULFATE 0.83 MG/ML
2.5 SOLUTION RESPIRATORY (INHALATION)
Status: CANCELLED | OUTPATIENT
Start: 2021-09-13

## 2021-08-30 RX ORDER — METHYLPREDNISOLONE SODIUM SUCCINATE 125 MG/2ML
125 INJECTION, POWDER, LYOPHILIZED, FOR SOLUTION INTRAMUSCULAR; INTRAVENOUS
Status: CANCELLED
Start: 2021-09-13

## 2021-08-30 RX ORDER — EPINEPHRINE 1 MG/ML
0.3 INJECTION, SOLUTION INTRAMUSCULAR; SUBCUTANEOUS EVERY 5 MIN PRN
Status: CANCELLED | OUTPATIENT
Start: 2021-09-13

## 2021-08-30 RX ORDER — DIPHENHYDRAMINE HCL 25 MG
25 CAPSULE ORAL ONCE
Status: CANCELLED
Start: 2021-09-13 | End: 2021-09-13

## 2021-08-30 RX ORDER — METHYLPREDNISOLONE SODIUM SUCCINATE 125 MG/2ML
125 INJECTION, POWDER, LYOPHILIZED, FOR SOLUTION INTRAMUSCULAR; INTRAVENOUS ONCE
Status: CANCELLED
Start: 2021-09-13 | End: 2021-09-13

## 2021-08-30 RX ORDER — ACETAMINOPHEN 325 MG/1
650 TABLET ORAL ONCE
Status: CANCELLED
Start: 2021-09-13 | End: 2021-09-13

## 2021-08-30 RX ORDER — DIPHENHYDRAMINE HYDROCHLORIDE 50 MG/ML
50 INJECTION INTRAMUSCULAR; INTRAVENOUS
Status: CANCELLED
Start: 2021-09-13

## 2021-08-30 RX ORDER — ALBUTEROL SULFATE 90 UG/1
1-2 AEROSOL, METERED RESPIRATORY (INHALATION)
Status: CANCELLED
Start: 2021-09-13

## 2021-08-30 RX ORDER — MEPERIDINE HYDROCHLORIDE 25 MG/ML
25 INJECTION INTRAMUSCULAR; INTRAVENOUS; SUBCUTANEOUS EVERY 30 MIN PRN
Status: CANCELLED | OUTPATIENT
Start: 2021-09-13

## 2021-08-30 RX ADMIN — SODIUM CHLORIDE 300 MG: 9 INJECTION, SOLUTION INTRAVENOUS at 10:14

## 2021-08-30 NOTE — PROGRESS NOTES
Infusion Nursing Note:  Xochitl FLOREZ Harshal presents today for IV Inflectra.    Patient seen by provider today: No   present during visit today: Not Applicable.    Note: Inflectra given over 2 hours at 152 ml/hr.      Intravenous Access:  Peripheral IV placed.    Treatment Conditions:  Biological Infusion Checklist:  ~~~ NOTE: If the patient answers yes to any of the questions below, hold the infusion and contact ordering provider or on-call provider.    1. Have you recently had an elevated temperature, fever, chills, productive cough, coughing for 3 weeks or longer or hemoptysis, abnormal vital signs, night sweats,  chest pain or have you noticed a decrease in your appetite, unexplained weight loss or fatigue? No  2. Do you have any open wounds or new incisions? No  3. Do you have any recent or upcoming hospitalizations, surgeries or dental procedures? No  4. Do you currently have or recently have had any signs of illness or infection or are you on any antibiotics? No  5. Have you had any new, sudden or worsening abdominal pain? No  6. Have you or anyone in your household received a live vaccination in the past 4 weeks? Please note:  No live vaccines while on biologic/chemotherapy until 6 months after the last treatment.  Patient can receive the flu vaccine (shot only) and the pneumovax.  It is optimal for the patient to get these vaccines mid cycle, but they can be given at any time as long as it is not on the day of the infusion. No  7. Have you recently been diagnosed with any new nervous system diseases (ie. Multiple sclerosis, Guillain [place], seizures, neurological changes) or cancer diagnosis? No  8. Are you on any form of radiation or chemotherapy? No  9. Are you pregnant or breast feeding or do you have plans of pregnancy in the future? No  10. Have you been having any signs of worsening depression or suicidal ideations?  (benlysta only) No  11. Have there been any other new onset medical symptoms?  "No        Post Infusion Assessment:  Patient tolerated infusion without incident.  Site patent and intact, free from redness, edema or discomfort.  No evidence of extravasations.  Access discontinued per protocol.     POST-INFUSION OF BIOLOGICAL MEDICATION:  Reviewed with patient.  Given biologic medication or medication hand-out. Inform patient if any fever, chills or signs of infection, new symptoms, abdominal pain, heart palpitations, shortness of breath, reaction, weakness, neurological changes, seek medical attention immediately and should not receive infusions. No live virus vaccines prior to or during treatment or up to 6 months post infusion. If the patient has an upcoming procedure or surgery, this should be discussed with the rheumatologist and surgeon or provider.    Discharge Plan:   Copy of AVS reviewed with patient.  Patient will return in 2 weeks for next appointment.  Patient discharged in stable condition accompanied by: self.  Departure Mode: Ambulatory.    Administrations This Visit     inFLIXimab-dyyb (INFLECTRA) 300 mg in sodium chloride 0.9 % 305 mL infusion     Admin Date  08/30/2021 Action  New Bag Dose  300 mg Rate  152.5 mL/hr Route  Intravenous Administered By  Tyrone Cueto RN              Vital signs:  Temp: 97.7  F (36.5  C) Temp src: Oral BP: 110/70 Pulse: 88   Resp: 18 SpO2: 100 % O2 Device: None (Room air)     Weight: 60.1 kg (132 lb 9.6 oz)  Estimated body mass index is 20.77 kg/m  as calculated from the following:    Height as of 8/17/21: 1.702 m (5' 7\").    Weight as of this encounter: 60.1 kg (132 lb 9.6 oz).                          "

## 2021-08-30 NOTE — PATIENT INSTRUCTIONS
Dear Xochitl Caputo    Thank you for choosing [facility] ([facility]) for your infusion.  The following information is a summary of our appointment as well as important reminders.      EDUCATION POST BIOLOGICAL/CHEMOTHERAPY INFUSION  Call the triage nurse at your clinic or seek medical attention if you have chills and/or temperature greater than or equal to 100.5, uncontrolled nausea/vomiting, diarrhea, constipation, dizziness, shortness of breath, chest pain, heart palpitations, weakness or any other new or concerning symptoms, questions or concerns.  You can not have any live virus vaccines prior to or during treatment or up to 6 months post infusion.  If you have an upcoming surgery, medical procedure or dental procedure during treatment, this should be discussed with your ordering physician and your surgeon/dentist.  If you are having any concerning symptom, if you are unsure if you should get your next infusion or wish to speak to a provider before your next infusion, please call your care coordinator or triage nurse at your clinic to notify them so we can adequately serve you.        Uses  This medicine is used for the following purposes:    arthritis    inflammatory bowel disease    psoriasis    sarcoidosis    skin wound  Instructions  This is an IV medicine. It is given through a sterile tube directly into the vein by a healthcare provider.  This medicine is given gradually through the IV line.  Carefully follow the instructions for preparing this medicine before injection.  Dilute with 0.9% sodium chloride, as directed.  Do not dilute the medicine until ready to use.  Do not mix this medicine with other solutions.  Always inspect the medicine before using.  The liquid should be clear or light yellow.  Do not use the medicine if it contains any particles or if it has changed color.  Check the medicine before each use. If the liquid medicine has any  particles in it, appears discolored, or if the vial appears damaged, do not use it.  Do not shake the medicine before using.  Keep this medicine in the refrigerator. Do not freeze.  Keep the medicine in its original container.  Speak with your nurse or pharmacist about how long the medicine can be stored safely at room temperature or in the refrigerator before it needs to be discarded.  Never use any medicine that has .  Please ask your doctor, nurse, or pharmacist how to discard unused medicines safely.  This medicine should be given by a trained health care provider.  Wash your hands before and after handling this medicine.  This medicine is to be given continuously. Contact your doctor or home care provider right away if the medicine is interrupted.  It is important that you keep taking each dose of this medicine on time even if you are feeling well.  If you miss a dose, contact your doctor for instructions.  Please tell your doctor and pharmacist about all the medicines you take. Include both prescription and over-the-counter medicines. Also tell them about any vitamins, herbal medicines, or anything else you take for your health.  If your symptoms do not improve or they worsen while on this medicine, contact your doctor.  Your doctor may prescribe other medications to reduce side effects. Follow instructions carefully.  Talk to your doctor before taking other medicines, including aspirins and ibuprofen containing products. Speak to your doctor about which medicines are safe to use while you are on this medicine.  Do not suddenly stop taking this medicine. Check with your doctor before stopping.  It is very important that you follow your doctor's instructions for all blood tests.  It is very important that you keep all appointments for medical exams and tests while on this medicine.  Do not take the medicine more than once during 24 hours.  Cautions  Tell your doctor and pharmacist if you ever had an  allergic reaction to a medicine. Symptoms of an allergic reaction can include trouble breathing, skin rash, itching, swelling, or severe dizziness.  Your doctor should check you for tuberculosis (TB) before you start this medicine and while you are using it. Tell your doctor if you are being treated for TB or any other infection.  Some patients with weak hearts may have worsening of symptoms. If you notice difficulty breathing, weight gain, or swelling of your legs or ankles, let your doctor know right away.  Some patients on this medicine have developed severe, life-threatening infections. Please speak with your doctor about the risks and benefits of using this medicine.  Some patients taking this medicine have experienced serious side effects. Please speak with your doctor to understand the risks and benefits associated with this medicine.  This medicine may increase the risk of some types of cancer. Please speak with your doctor about the risks and benefits of using this medicine.  This medicine is associated with a rare, but serious problem of the liver. Speak to your doctor about the early signs of liver problems and the benefits and risks of using this medicine.  This medicine may reduce your body's ability to fight infections. Try to avoid contact with people with colds, flu or other infections.  Contact your doctor if you develop any signs of a new infection such as fever, cough, sore throat, or chills.  Wash your hands often and avoid close contact with people with infections such as colds and flu.  Speak with your health care provider before receiving any vaccinations.  Tell the doctor or pharmacist if you are pregnant, planning to be pregnant, or breastfeeding.  Ask your pharmacist if this medicine can interact with any of your other medicines. Be sure to tell them about all the medicines you take.  Always carry an ID card or wear a medical alert bracelet indicating your medical condition.  Please tell  all your doctors and dentists that you are on this medicine before they provide care.  Do not start or stop any other medicines without first speaking to your doctor or pharmacist.  Do not share this medicine with anyone who has not been prescribed this medicine.  This medicine can cause serious side effects in some patients. Important information from the [place] Food and Drug Administration (FDA) is available from your pharmacist. Please review it carefully with your pharmacist to understand the risks associated with this medicine.  Side Effects  The following is a list of some common side effects from this medicine. Please speak with your doctor about what you should do if you experience these or other side effects.    headaches    nausea    stomach pain  Call your doctor or get medical help right away if you notice any of these more serious side effects:    increased risk of bleeding    unusual bruising or discoloration on skin    increased risk of cancer    chest pain    confusion    feeling of heat or flushing    fever or chills    pain, redness, swelling or blistering on hands, feet or elbows    fast or irregular heart beats    slow heartbeat    pain in the joints    symptoms of liver damage (such as yellowing of skin or eyes, dark urine, unusual tiredness or weakness; severe stomach or back pain)    muscle pain    muscle weakness    feeling of numbness or tingling in your hands and feet    pain near injection site    skin irritation such as redness, itching, rash, or burning    butterfly-shaped rash on nose and cheeks    seizures    shortness of breath    sweating at night    blurring or changes of vision  A few people may have an allergic reactions to this medicine. Symptoms can include difficulty breathing, skin rash, itching, swelling, or severe dizziness. If you notice any of these symptoms, seek medical help quickly.  Extra  Please speak with your doctor, nurse, or pharmacist if you have any questions  about this medicine.  [email or web address]  IMPORTANT NOTE: This document tells you briefly how to take your medicine, but it does not tell you all there is to know about it.Your doctor or pharmacist may give you other documents about your medicine. Please talk to them if you have any questions.Always follow their advice. There is a more complete description of this medicine available in English.Scan this code on your smartphone or tablet or use the web address below. You can also ask your pharmacist for a printout. If you have any questions, please ask your pharmacist.     2021 [employer].             We look forward in seeing you on your next appointment here at [facility] ([facility]).  Please don t hesitate to call us at [phone] to reschedule any of your appointments or to speak with one of the [facility] registered nurses.  It was a pleasure taking care of you today.    Sincerely,    [facility]  [facility]  [address] [place] [address]  [address]  Phone:  [phone]

## 2021-08-30 NOTE — LETTER
8/30/2021         RE: Xochitl Caputo  [address]  [facility] [address]        Dear Colleague,    Thank you for referring your patient, Xochitl Caputo, to the [facility]. Please see a copy of my visit note below.    Infusion Nursing Note:  Xochitl Caputo presents today for IV Inflectra.    Patient seen by provider today: No   present during visit today: Not Applicable.    Note: Inflectra given over 2 hours at 152 ml/hr.      Intravenous Access:  Peripheral IV placed.    Treatment Conditions:  Biological Infusion Checklist:  ~~~ NOTE: If the patient answers yes to any of the questions below, hold the infusion and contact ordering provider or on-call provider.    1. Have you recently had an elevated temperature, fever, chills, productive cough, coughing for 3 weeks or longer or hemoptysis, abnormal vital signs, night sweats,  chest pain or have you noticed a decrease in your appetite, unexplained weight loss or fatigue? No  2. Do you have any open wounds or new incisions? No  3. Do you have any recent or upcoming hospitalizations, surgeries or dental procedures? No  4. Do you currently have or recently have had any signs of illness or infection or are you on any antibiotics? No  5. Have you had any new, sudden or worsening abdominal pain? No  6. Have you or anyone in your household received a live vaccination in the past 4 weeks? Please note:  No live vaccines while on biologic/chemotherapy until 6 months after the last treatment.  Patient can receive the flu vaccine (shot only) and the pneumovax.  It is optimal for the patient to get these vaccines mid cycle, but they can be given at any time as long as it is not on the day of the infusion. No  7. Have you recently been diagnosed with any new nervous system diseases (ie. Multiple sclerosis, Guillain [place], seizures, neurological changes) or cancer diagnosis? No  8. Are you on any form  "of radiation or chemotherapy? No  9. Are you pregnant or breast feeding or do you have plans of pregnancy in the future? No  10. Have you been having any signs of worsening depression or suicidal ideations?  (benlysta only) No  11. Have there been any other new onset medical symptoms? No        Post Infusion Assessment:  Patient tolerated infusion without incident.  Site patent and intact, free from redness, edema or discomfort.  No evidence of extravasations.  Access discontinued per protocol.     POST-INFUSION OF BIOLOGICAL MEDICATION:  Reviewed with patient.  Given biologic medication or medication hand-out. Inform patient if any fever, chills or signs of infection, new symptoms, abdominal pain, heart palpitations, shortness of breath, reaction, weakness, neurological changes, seek medical attention immediately and should not receive infusions. No live virus vaccines prior to or during treatment or up to 6 months post infusion. If the patient has an upcoming procedure or surgery, this should be discussed with the rheumatologist and surgeon or provider.    Discharge Plan:   Copy of AVS reviewed with patient.  Patient will return in 2 weeks for next appointment.  Patient discharged in stable condition accompanied by: self.  Departure Mode: Ambulatory.    Administrations This Visit     inFLIXimab-dyyb (INFLECTRA) 300 mg in sodium chloride 0.9 % 305 mL infusion     Admin Date  08/30/2021 Action  New Bag Dose  300 mg Rate  152.5 mL/hr Route  Intravenous Administered By  Tyrone Cueto RN              Vital signs:  Temp: 97.7  F (36.5  C) Temp src: Oral BP: 110/70 Pulse: 88   Resp: 18 SpO2: 100 % O2 Device: None (Room air)     Weight: 60.1 kg (132 lb 9.6 oz)  Estimated body mass index is 20.77 kg/m  as calculated from the following:    Height as of 8/17/21: 1.702 m (5' 7\").    Weight as of this encounter: 60.1 kg (132 lb 9.6 oz).                              Again, thank you for allowing me to participate in the care " of your patient.        Sincerely,        [facility]

## 2021-09-01 DIAGNOSIS — K51.011 ULCERATIVE PANCOLITIS WITH RECTAL BLEEDING (H): ICD-10-CM

## 2021-09-03 RX ORDER — PREDNISONE 5 MG/1
TABLET ORAL
Qty: 84 TABLET | Refills: 0 | OUTPATIENT
Start: 2021-09-03 | End: 2021-09-27

## 2021-09-13 ENCOUNTER — INFUSION THERAPY VISIT (OUTPATIENT)
Dept: INFUSION THERAPY | Facility: CLINIC | Age: 26
End: 2021-09-13
Attending: INTERNAL MEDICINE
Payer: COMMERCIAL

## 2021-09-13 VITALS
DIASTOLIC BLOOD PRESSURE: 63 MMHG | RESPIRATION RATE: 16 BRPM | TEMPERATURE: 98.7 F | WEIGHT: 136.8 LBS | HEART RATE: 86 BPM | BODY MASS INDEX: 21.43 KG/M2 | SYSTOLIC BLOOD PRESSURE: 99 MMHG | OXYGEN SATURATION: 98 %

## 2021-09-13 DIAGNOSIS — K51.911 ULCERATIVE COLITIS WITH RECTAL BLEEDING, UNSPECIFIED LOCATION (H): Primary | ICD-10-CM

## 2021-09-13 LAB
ALBUMIN SERPL-MCNC: 2.8 G/DL (ref 3.4–5)
ALP SERPL-CCNC: 37 U/L (ref 40–150)
ALT SERPL W P-5'-P-CCNC: 18 U/L (ref 0–50)
AST SERPL W P-5'-P-CCNC: 19 U/L (ref 0–45)
BASOPHILS # BLD AUTO: 0.1 10E3/UL (ref 0–0.2)
BASOPHILS NFR BLD AUTO: 2 %
BILIRUB DIRECT SERPL-MCNC: 0.1 MG/DL (ref 0–0.2)
BILIRUB SERPL-MCNC: 0.4 MG/DL (ref 0.2–1.3)
CRP SERPL-MCNC: <2.9 MG/L (ref 0–8)
EOSINOPHIL # BLD AUTO: 0.5 10E3/UL (ref 0–0.7)
EOSINOPHIL NFR BLD AUTO: 9 %
ERYTHROCYTE [DISTWIDTH] IN BLOOD BY AUTOMATED COUNT: 16.1 % (ref 10–15)
ERYTHROCYTE [SEDIMENTATION RATE] IN BLOOD BY WESTERGREN METHOD: 16 MM/HR (ref 0–20)
HCT VFR BLD AUTO: 33.8 % (ref 35–47)
HGB BLD-MCNC: 11 G/DL (ref 11.7–15.7)
IMM GRANULOCYTES # BLD: 0 10E3/UL
IMM GRANULOCYTES NFR BLD: 0 %
LYMPHOCYTES # BLD AUTO: 2.8 10E3/UL (ref 0.8–5.3)
LYMPHOCYTES NFR BLD AUTO: 48 %
MCH RBC QN AUTO: 27.9 PG (ref 26.5–33)
MCHC RBC AUTO-ENTMCNC: 32.5 G/DL (ref 31.5–36.5)
MCV RBC AUTO: 86 FL (ref 78–100)
MONOCYTES # BLD AUTO: 0.5 10E3/UL (ref 0–1.3)
MONOCYTES NFR BLD AUTO: 10 %
NEUTROPHILS # BLD AUTO: 1.8 10E3/UL (ref 1.6–8.3)
NEUTROPHILS NFR BLD AUTO: 31 %
NRBC # BLD AUTO: 0 10E3/UL
NRBC BLD AUTO-RTO: 0 /100
PLATELET # BLD AUTO: 278 10E3/UL (ref 150–450)
PROT SERPL-MCNC: 6.2 G/DL (ref 6.8–8.8)
RBC # BLD AUTO: 3.94 10E6/UL (ref 3.8–5.2)
WBC # BLD AUTO: 5.7 10E3/UL (ref 4–11)

## 2021-09-13 PROCEDURE — 85652 RBC SED RATE AUTOMATED: CPT | Performed by: INTERNAL MEDICINE

## 2021-09-13 PROCEDURE — 36415 COLL VENOUS BLD VENIPUNCTURE: CPT | Performed by: INTERNAL MEDICINE

## 2021-09-13 PROCEDURE — 250N000011 HC RX IP 250 OP 636: Performed by: INTERNAL MEDICINE

## 2021-09-13 PROCEDURE — 85025 COMPLETE CBC W/AUTO DIFF WBC: CPT | Performed by: INTERNAL MEDICINE

## 2021-09-13 PROCEDURE — 258N000003 HC RX IP 258 OP 636: Performed by: INTERNAL MEDICINE

## 2021-09-13 PROCEDURE — 96365 THER/PROPH/DIAG IV INF INIT: CPT

## 2021-09-13 PROCEDURE — 80076 HEPATIC FUNCTION PANEL: CPT | Performed by: INTERNAL MEDICINE

## 2021-09-13 PROCEDURE — 96366 THER/PROPH/DIAG IV INF ADDON: CPT

## 2021-09-13 PROCEDURE — 86140 C-REACTIVE PROTEIN: CPT | Performed by: INTERNAL MEDICINE

## 2021-09-13 RX ORDER — ACETAMINOPHEN 325 MG/1
650 TABLET ORAL ONCE
Status: CANCELLED
Start: 2021-10-25 | End: 2021-10-25

## 2021-09-13 RX ORDER — METHYLPREDNISOLONE SODIUM SUCCINATE 125 MG/2ML
125 INJECTION, POWDER, LYOPHILIZED, FOR SOLUTION INTRAMUSCULAR; INTRAVENOUS
Status: CANCELLED
Start: 2021-10-25

## 2021-09-13 RX ORDER — DIPHENHYDRAMINE HYDROCHLORIDE 50 MG/ML
50 INJECTION INTRAMUSCULAR; INTRAVENOUS
Status: CANCELLED
Start: 2021-10-25

## 2021-09-13 RX ORDER — NALOXONE HYDROCHLORIDE 0.4 MG/ML
0.2 INJECTION, SOLUTION INTRAMUSCULAR; INTRAVENOUS; SUBCUTANEOUS
Status: CANCELLED | OUTPATIENT
Start: 2021-10-25

## 2021-09-13 RX ORDER — ALBUTEROL SULFATE 90 UG/1
1-2 AEROSOL, METERED RESPIRATORY (INHALATION)
Status: CANCELLED
Start: 2021-10-25

## 2021-09-13 RX ORDER — DIPHENHYDRAMINE HCL 25 MG
25 CAPSULE ORAL ONCE
Status: CANCELLED
Start: 2021-10-25 | End: 2021-10-25

## 2021-09-13 RX ORDER — METHYLPREDNISOLONE SODIUM SUCCINATE 125 MG/2ML
125 INJECTION, POWDER, LYOPHILIZED, FOR SOLUTION INTRAMUSCULAR; INTRAVENOUS ONCE
Status: CANCELLED
Start: 2021-10-25 | End: 2021-10-25

## 2021-09-13 RX ORDER — ALBUTEROL SULFATE 0.83 MG/ML
2.5 SOLUTION RESPIRATORY (INHALATION)
Status: CANCELLED | OUTPATIENT
Start: 2021-10-25

## 2021-09-13 RX ORDER — EPINEPHRINE 1 MG/ML
0.3 INJECTION, SOLUTION INTRAMUSCULAR; SUBCUTANEOUS EVERY 5 MIN PRN
Status: CANCELLED | OUTPATIENT
Start: 2021-10-25

## 2021-09-13 RX ORDER — MEPERIDINE HYDROCHLORIDE 25 MG/ML
25 INJECTION INTRAMUSCULAR; INTRAVENOUS; SUBCUTANEOUS EVERY 30 MIN PRN
Status: CANCELLED | OUTPATIENT
Start: 2021-10-25

## 2021-09-13 RX ADMIN — SODIUM CHLORIDE 300 MG: 9 INJECTION, SOLUTION INTRAVENOUS at 14:59

## 2021-09-13 NOTE — PROGRESS NOTES
Infusion Nursing Note:  Xochitl Caputo presents today for Inflectra.    Patient seen by provider today: No   present during visit today: Not Applicable.    Note:   -Premedications- none  -Today is her 2nd dose, infused over approximately 2 hrs.    Intravenous Access:  Labs drawn without difficulty.  Peripheral IV placed.    Treatment Conditions:  Biological Infusion Checklist:  ~~~ NOTE: If the patient answers yes to any of the questions below, hold the infusion and contact ordering provider or on-call provider.    1. Have you recently had an elevated temperature, fever, chills, productive cough, coughing for 3 weeks or longer or hemoptysis, abnormal vital signs, night sweats,  chest pain or have you noticed a decrease in your appetite, unexplained weight loss or fatigue? No  2. Do you have any open wounds or new incisions? No  3. Do you have any recent or upcoming hospitalizations, surgeries or dental procedures? No  4. Do you currently have or recently have had any signs of illness or infection or are you on any antibiotics? No  5. Have you had any new, sudden or worsening abdominal pain? No  6. Have you or anyone in your household received a live vaccination in the past 4 weeks? Please note:  No live vaccines while on biologic/chemotherapy until 6 months after the last treatment.  Patient can receive the flu vaccine (shot only) and the pneumovax.  It is optimal for the patient to get these vaccines mid cycle, but they can be given at any time as long as it is not on the day of the infusion. No  7. Have you recently been diagnosed with any new nervous system diseases (ie. Multiple sclerosis, Guillain [place], seizures, neurological changes) or cancer diagnosis? No  8. Are you on any form of radiation or chemotherapy? No  9. Are you pregnant or breast feeding or do you have plans of pregnancy in the future? No  10. Have you been having any signs of worsening depression or suicidal ideations?  (benlysta  only) N/A  11. Have there been any other new onset medical symptoms? No        Post Infusion Assessment:  Patient tolerated infusion without incident.  Blood return noted pre and post infusion.  Site patent and intact, free from redness, edema or discomfort.  No evidence of extravasations.  Access discontinued per protocol.  Biologic Infusion Post Education: Call the triage nurse at your clinic or seek medical attention if you have chills and/or temperature greater than or equal to 100.5, uncontrolled nausea/vomiting, diarrhea, constipation, dizziness, shortness of breath, chest pain, heart palpitations, weakness or any other new or concerning symptoms, questions or concerns.  You cannot have any live virus vaccines prior to or during treatment or up to 6 months post infusion.  If you have an upcoming surgery, medical procedure or dental procedure during treatment, this should be discussed with your ordering physician and your surgeon/dentist.  If you are having any concerning symptom, if you are unsure if you should get your next infusion or wish to speak to a provider before your next infusion, please call your care coordinator or triage nurse at your clinic to notify them so we can adequately serve you.       Discharge Plan:   AVS to patient via [employer].  Patient will return 10/11/21 for next appointment.   Patient discharged in stable condition accompanied by: self.  Departure Mode: Ambulatory.    Ysabel Tan, RN    /71   Pulse 90   Temp 98.7  F (37.1  C) (Oral)   Resp 16   Wt 62.1 kg (136 lb 12.8 oz)   LMP 08/15/2021   SpO2 97%   BMI 21.43 kg/m      Administrations This Visit     inFLIXimab-dyyb (INFLECTRA) 300 mg in sodium chloride 0.9 % 305 mL infusion     Admin Date  09/13/2021 Action  New Bag Dose  300 mg Rate  152.5 mL/hr Route  Intravenous Administered By  Ysabel Tan, RN

## 2021-09-13 NOTE — PATIENT INSTRUCTIONS
Dear Xochitl Caputo    Thank you for choosing [facility] ([facility]) for your infusion.  The following information is a summary of our appointment as well as important reminders.      We look forward in seeing you on your next appointment here at [facility] ([facility]).  Please don t hesitate to call us at [phone] to reschedule any of your appointments or to speak with one of the [facility] registered nurses.  It was a pleasure taking care of you today.    Sincerely,    [facility]  [facility]  [address] [place] [address]  [id number]  Phone:  [phone]  Patient Education     Inflectra Injection 100 mg  Uses  This medicine is used for the following purposes:    arthritis    inflammatory bowel disease    psoriasis    sarcoidosis    skin wound  Instructions  This is an IV medicine. It is given through a sterile tube directly into the vein by a healthcare provider.  This medicine is given gradually through the IV line.  Carefully follow the instructions for preparing this medicine before injection.  Dilute with 0.9% sodium chloride, as directed.  Do not dilute the medicine until ready to use.  Do not mix this medicine with other solutions.  Always inspect the medicine before using.  The liquid should be clear or light yellow.  Do not use the medicine if it contains any particles or if it has changed color.  Check the medicine before each use. If the liquid medicine has any particles in it, appears discolored, or if the vial appears damaged, do not use it.  Do not shake the medicine before using.  Keep this medicine in the refrigerator. Do not freeze.  Keep the medicine in its original container.  Speak with your nurse or pharmacist about how long the medicine can be stored safely at room temperature or in the refrigerator before it needs to be discarded.  Never use any  medicine that has .  Please ask your doctor, nurse, or pharmacist how to discard unused medicines safely.  This medicine should be given by a trained health care provider.  Wash your hands before and after handling this medicine.  This medicine is to be given continuously. Contact your doctor or home care provider right away if the medicine is interrupted.  It is important that you keep taking each dose of this medicine on time even if you are feeling well.  If you miss a dose, contact your doctor for instructions.  Please tell your doctor and pharmacist about all the medicines you take. Include both prescription and over-the-counter medicines. Also tell them about any vitamins, herbal medicines, or anything else you take for your health.  If your symptoms do not improve or they worsen while on this medicine, contact your doctor.  Your doctor may prescribe other medications to reduce side effects. Follow instructions carefully.  Talk to your doctor before taking other medicines, including aspirins and ibuprofen containing products. Speak to your doctor about which medicines are safe to use while you are on this medicine.  Do not suddenly stop taking this medicine. Check with your doctor before stopping.  It is very important that you follow your doctor's instructions for all blood tests.  It is very important that you keep all appointments for medical exams and tests while on this medicine.  Do not take the medicine more than once during 24 hours.  Cautions  Tell your doctor and pharmacist if you ever had an allergic reaction to a medicine. Symptoms of an allergic reaction can include trouble breathing, skin rash, itching, swelling, or severe dizziness.  Your doctor should check you for tuberculosis (TB) before you start this medicine and while you are using it. Tell your doctor if you are being treated for TB or any other infection.  Some patients with weak hearts may have worsening of symptoms. If you  notice difficulty breathing, weight gain, or swelling of your legs or ankles, let your doctor know right away.  Some patients on this medicine have developed severe, life-threatening infections. Please speak with your doctor about the risks and benefits of using this medicine.  Some patients taking this medicine have experienced serious side effects. Please speak with your doctor to understand the risks and benefits associated with this medicine.  This medicine may increase the risk of some types of cancer. Please speak with your doctor about the risks and benefits of using this medicine.  This medicine is associated with a rare, but serious problem of the liver. Speak to your doctor about the early signs of liver problems and the benefits and risks of using this medicine.  This medicine may reduce your body's ability to fight infections. Try to avoid contact with people with colds, flu or other infections.  Contact your doctor if you develop any signs of a new infection such as fever, cough, sore throat, or chills.  Wash your hands often and avoid close contact with people with infections such as colds and flu.  Speak with your health care provider before receiving any vaccinations.  Tell the doctor or pharmacist if you are pregnant, planning to be pregnant, or breastfeeding.  Ask your pharmacist if this medicine can interact with any of your other medicines. Be sure to tell them about all the medicines you take.  Always carry an ID card or wear a medical alert bracelet indicating your medical condition.  Please tell all your doctors and dentists that you are on this medicine before they provide care.  Do not start or stop any other medicines without first speaking to your doctor or pharmacist.  Do not share this medicine with anyone who has not been prescribed this medicine.  This medicine can cause serious side effects in some patients. Important information from the U[place]. Food and Drug Administration (FDA) is  available from your pharmacist. Please review it carefully with your pharmacist to understand the risks associated with this medicine.  Side Effects  The following is a list of some common side effects from this medicine. Please speak with your doctor about what you should do if you experience these or other side effects.    headaches    nausea    stomach pain  Call your doctor or get medical help right away if you notice any of these more serious side effects:    increased risk of bleeding    unusual bruising or discoloration on skin    increased risk of cancer    chest pain    confusion    feeling of heat or flushing    fever or chills    pain, redness, swelling or blistering on hands, feet or elbows    fast or irregular heart beats    slow heartbeat    pain in the joints    symptoms of liver damage (such as yellowing of skin or eyes, dark urine, unusual tiredness or weakness; severe stomach or back pain)    muscle pain    muscle weakness    feeling of numbness or tingling in your hands and feet    pain near injection site    skin irritation such as redness, itching, rash, or burning    butterfly-shaped rash on nose and cheeks    seizures    shortness of breath    sweating at night    blurring or changes of vision  A few people may have an allergic reactions to this medicine. Symptoms can include difficulty breathing, skin rash, itching, swelling, or severe dizziness. If you notice any of these symptoms, seek medical help quickly.  Extra  Please speak with your doctor, nurse, or pharmacist if you have any questions about this medicine.  [email or web address]  IMPORTANT NOTE: This document tells you briefly how to take your medicine, but it does not tell you all there is to know about it.Your doctor or pharmacist may give you other documents about your medicine. Please talk to them if you have any questions.Always follow their advice. There is a more complete description of this medicine  available in English.Scan this code on your smartphone or tablet or use the web address below. You can also ask your pharmacist for a printout. If you have any questions, please ask your pharmacist.     2021 [employer].

## 2021-09-13 NOTE — LETTER
9/13/2021         RE: Xochitl Caputo  [address]  [address]  [facility] [address]        Dear Colleague,    Thank you for referring your patient, Xochitl Caputo, to the [facility]. Please see a copy of my visit note below.    Infusion Nursing Note:  Xochitl Caputo presents today for Inflectra.    Patient seen by provider today: No   present during visit today: Not Applicable.    Note:   -Premedications- none  -Today is her 2nd dose, infused over approximately 2 hrs.    Intravenous Access:  Labs drawn without difficulty.  Peripheral IV placed.    Treatment Conditions:  Biological Infusion Checklist:  ~~~ NOTE: If the patient answers yes to any of the questions below, hold the infusion and contact ordering provider or on-call provider.    1. Have you recently had an elevated temperature, fever, chills, productive cough, coughing for 3 weeks or longer or hemoptysis, abnormal vital signs, night sweats,  chest pain or have you noticed a decrease in your appetite, unexplained weight loss or fatigue? No  2. Do you have any open wounds or new incisions? No  3. Do you have any recent or upcoming hospitalizations, surgeries or dental procedures? No  4. Do you currently have or recently have had any signs of illness or infection or are you on any antibiotics? No  5. Have you had any new, sudden or worsening abdominal pain? No  6. Have you or anyone in your household received a live vaccination in the past 4 weeks? Please note:  No live vaccines while on biologic/chemotherapy until 6 months after the last treatment.  Patient can receive the flu vaccine (shot only) and the pneumovax.  It is optimal for the patient to get these vaccines mid cycle, but they can be given at any time as long as it is not on the day of the infusion. No  7. Have you recently been diagnosed with any new nervous system diseases (ie. Multiple sclerosis, Guillain [place], seizures,  neurological changes) or cancer diagnosis? No  8. Are you on any form of radiation or chemotherapy? No  9. Are you pregnant or breast feeding or do you have plans of pregnancy in the future? No  10. Have you been having any signs of worsening depression or suicidal ideations?  (benlysta only) N/A  11. Have there been any other new onset medical symptoms? No        Post Infusion Assessment:  Patient tolerated infusion without incident.  Blood return noted pre and post infusion.  Site patent and intact, free from redness, edema or discomfort.  No evidence of extravasations.  Access discontinued per protocol.  Biologic Infusion Post Education: Call the triage nurse at your clinic or seek medical attention if you have chills and/or temperature greater than or equal to 100.5, uncontrolled nausea/vomiting, diarrhea, constipation, dizziness, shortness of breath, chest pain, heart palpitations, weakness or any other new or concerning symptoms, questions or concerns.  You cannot have any live virus vaccines prior to or during treatment or up to 6 months post infusion.  If you have an upcoming surgery, medical procedure or dental procedure during treatment, this should be discussed with your ordering physician and your surgeon/dentist.  If you are having any concerning symptom, if you are unsure if you should get your next infusion or wish to speak to a provider before your next infusion, please call your care coordinator or triage nurse at your clinic to notify them so we can adequately serve you.       Discharge Plan:   AVS to patient via MYCHART.  Patient will return 10/11/21 for next appointment.   Patient discharged in stable condition accompanied by: self.  Departure Mode: Ambulatory.    Ysabel Tan RN    /71   Pulse 90   Temp 98.7  F (37.1  C) (Oral)   Resp 16   Wt 62.1 kg (136 lb 12.8 oz)   LMP 08/15/2021   SpO2 97%   BMI 21.43 kg/m      Administrations This Visit     inFLIXimab-dyyb (INFLECTRA)  300 mg in sodium chloride 0.9 % 305 mL infusion     Admin Date  09/13/2021 Action  New Bag Dose  300 mg Rate  152.5 mL/hr Route  Intravenous Administered By  Ysabel Tan RN                          Again, thank you for allowing me to participate in the care of your patient.        Sincerely,        [facility]

## 2021-10-06 ENCOUNTER — PATIENT OUTREACH (OUTPATIENT)
Dept: GASTROENTEROLOGY | Facility: CLINIC | Age: 26
End: 2021-10-06

## 2021-10-06 DIAGNOSIS — K51.90 ULCERATIVE COLITIS (H): Primary | ICD-10-CM

## 2021-10-06 NOTE — PROGRESS NOTES
Order placed for the patient to have a Remicade level prior the  third induction dose.  BMP also added to the paitent labs.

## 2021-10-11 ENCOUNTER — INFUSION THERAPY VISIT (OUTPATIENT)
Dept: INFUSION THERAPY | Facility: CLINIC | Age: 26
End: 2021-10-11
Attending: INTERNAL MEDICINE
Payer: COMMERCIAL

## 2021-10-11 VITALS
DIASTOLIC BLOOD PRESSURE: 68 MMHG | WEIGHT: 142.3 LBS | RESPIRATION RATE: 16 BRPM | HEART RATE: 84 BPM | OXYGEN SATURATION: 98 % | TEMPERATURE: 97.7 F | SYSTOLIC BLOOD PRESSURE: 95 MMHG | BODY MASS INDEX: 22.29 KG/M2

## 2021-10-11 DIAGNOSIS — K51.90 ULCERATIVE COLITIS (H): ICD-10-CM

## 2021-10-11 DIAGNOSIS — K51.911 ULCERATIVE COLITIS WITH RECTAL BLEEDING, UNSPECIFIED LOCATION (H): Primary | ICD-10-CM

## 2021-10-11 LAB
ALBUMIN SERPL-MCNC: 3.6 G/DL (ref 3.4–5)
ALP SERPL-CCNC: 62 U/L (ref 40–150)
ALT SERPL W P-5'-P-CCNC: 20 U/L (ref 0–50)
ANION GAP SERPL CALCULATED.3IONS-SCNC: 4 MMOL/L (ref 3–14)
AST SERPL W P-5'-P-CCNC: 15 U/L (ref 0–45)
BASOPHILS # BLD AUTO: 0.1 10E3/UL (ref 0–0.2)
BASOPHILS NFR BLD AUTO: 2 %
BILIRUB DIRECT SERPL-MCNC: 0.1 MG/DL (ref 0–0.2)
BILIRUB SERPL-MCNC: 0.3 MG/DL (ref 0.2–1.3)
BUN SERPL-MCNC: 5 MG/DL (ref 7–30)
CALCIUM SERPL-MCNC: 9 MG/DL (ref 8.5–10.1)
CHLORIDE BLD-SCNC: 108 MMOL/L (ref 94–109)
CO2 SERPL-SCNC: 26 MMOL/L (ref 20–32)
CREAT SERPL-MCNC: 0.61 MG/DL (ref 0.52–1.04)
CRP SERPL-MCNC: <2.9 MG/L (ref 0–8)
EOSINOPHIL # BLD AUTO: 0.5 10E3/UL (ref 0–0.7)
EOSINOPHIL NFR BLD AUTO: 9 %
ERYTHROCYTE [DISTWIDTH] IN BLOOD BY AUTOMATED COUNT: 16 % (ref 10–15)
ERYTHROCYTE [SEDIMENTATION RATE] IN BLOOD BY WESTERGREN METHOD: 10 MM/HR (ref 0–20)
GFR SERPL CREATININE-BSD FRML MDRD: >90 ML/MIN/1.73M2
GLUCOSE BLD-MCNC: 86 MG/DL (ref 70–99)
HCT VFR BLD AUTO: 40 % (ref 35–47)
HGB BLD-MCNC: 13.2 G/DL (ref 11.7–15.7)
IMM GRANULOCYTES # BLD: 0 10E3/UL
IMM GRANULOCYTES NFR BLD: 0 %
LYMPHOCYTES # BLD AUTO: 2.8 10E3/UL (ref 0.8–5.3)
LYMPHOCYTES NFR BLD AUTO: 53 %
MCH RBC QN AUTO: 28.6 PG (ref 26.5–33)
MCHC RBC AUTO-ENTMCNC: 33 G/DL (ref 31.5–36.5)
MCV RBC AUTO: 87 FL (ref 78–100)
MONOCYTES # BLD AUTO: 0.6 10E3/UL (ref 0–1.3)
MONOCYTES NFR BLD AUTO: 12 %
NEUTROPHILS # BLD AUTO: 1.2 10E3/UL (ref 1.6–8.3)
NEUTROPHILS NFR BLD AUTO: 24 %
NRBC # BLD AUTO: 0 10E3/UL
NRBC BLD AUTO-RTO: 0 /100
PLATELET # BLD AUTO: 288 10E3/UL (ref 150–450)
POTASSIUM BLD-SCNC: 4.2 MMOL/L (ref 3.4–5.3)
PROT SERPL-MCNC: 7.7 G/DL (ref 6.8–8.8)
RBC # BLD AUTO: 4.62 10E6/UL (ref 3.8–5.2)
SODIUM SERPL-SCNC: 138 MMOL/L (ref 133–144)
WBC # BLD AUTO: 5.2 10E3/UL (ref 4–11)

## 2021-10-11 PROCEDURE — 80048 BASIC METABOLIC PNL TOTAL CA: CPT | Performed by: INTERNAL MEDICINE

## 2021-10-11 PROCEDURE — 86140 C-REACTIVE PROTEIN: CPT | Performed by: INTERNAL MEDICINE

## 2021-10-11 PROCEDURE — 85004 AUTOMATED DIFF WBC COUNT: CPT | Performed by: INTERNAL MEDICINE

## 2021-10-11 PROCEDURE — 36415 COLL VENOUS BLD VENIPUNCTURE: CPT | Performed by: INTERNAL MEDICINE

## 2021-10-11 PROCEDURE — 258N000003 HC RX IP 258 OP 636: Performed by: INTERNAL MEDICINE

## 2021-10-11 PROCEDURE — 82542 COL CHROMOTOGRAPHY QUAL/QUAN: CPT

## 2021-10-11 PROCEDURE — 84999 UNLISTED CHEMISTRY PROCEDURE: CPT

## 2021-10-11 PROCEDURE — 96366 THER/PROPH/DIAG IV INF ADDON: CPT

## 2021-10-11 PROCEDURE — 85652 RBC SED RATE AUTOMATED: CPT | Performed by: INTERNAL MEDICINE

## 2021-10-11 PROCEDURE — 250N000011 HC RX IP 250 OP 636: Performed by: INTERNAL MEDICINE

## 2021-10-11 PROCEDURE — 82248 BILIRUBIN DIRECT: CPT | Performed by: INTERNAL MEDICINE

## 2021-10-11 PROCEDURE — 96365 THER/PROPH/DIAG IV INF INIT: CPT

## 2021-10-11 PROCEDURE — 80230 DRUG ASSAY INFLIXIMAB: CPT

## 2021-10-11 RX ORDER — METHYLPREDNISOLONE SODIUM SUCCINATE 125 MG/2ML
125 INJECTION, POWDER, LYOPHILIZED, FOR SOLUTION INTRAMUSCULAR; INTRAVENOUS ONCE
Status: CANCELLED
Start: 2021-10-25 | End: 2021-10-25

## 2021-10-11 RX ORDER — ALBUTEROL SULFATE 90 UG/1
1-2 AEROSOL, METERED RESPIRATORY (INHALATION)
Status: CANCELLED
Start: 2021-10-25

## 2021-10-11 RX ORDER — DIPHENHYDRAMINE HCL 25 MG
25 CAPSULE ORAL ONCE
Status: CANCELLED
Start: 2021-10-25 | End: 2021-10-25

## 2021-10-11 RX ORDER — NALOXONE HYDROCHLORIDE 0.4 MG/ML
0.2 INJECTION, SOLUTION INTRAMUSCULAR; INTRAVENOUS; SUBCUTANEOUS
Status: CANCELLED | OUTPATIENT
Start: 2021-10-25

## 2021-10-11 RX ORDER — METHYLPREDNISOLONE SODIUM SUCCINATE 125 MG/2ML
125 INJECTION, POWDER, LYOPHILIZED, FOR SOLUTION INTRAMUSCULAR; INTRAVENOUS
Status: CANCELLED
Start: 2021-10-25

## 2021-10-11 RX ORDER — ACETAMINOPHEN 325 MG/1
650 TABLET ORAL ONCE
Status: CANCELLED
Start: 2021-10-25 | End: 2021-10-25

## 2021-10-11 RX ORDER — EPINEPHRINE 1 MG/ML
0.3 INJECTION, SOLUTION INTRAMUSCULAR; SUBCUTANEOUS EVERY 5 MIN PRN
Status: CANCELLED | OUTPATIENT
Start: 2021-10-25

## 2021-10-11 RX ORDER — DIPHENHYDRAMINE HYDROCHLORIDE 50 MG/ML
50 INJECTION INTRAMUSCULAR; INTRAVENOUS
Status: CANCELLED
Start: 2021-10-25

## 2021-10-11 RX ORDER — MEPERIDINE HYDROCHLORIDE 25 MG/ML
25 INJECTION INTRAMUSCULAR; INTRAVENOUS; SUBCUTANEOUS EVERY 30 MIN PRN
Status: CANCELLED | OUTPATIENT
Start: 2021-10-25

## 2021-10-11 RX ORDER — ALBUTEROL SULFATE 0.83 MG/ML
2.5 SOLUTION RESPIRATORY (INHALATION)
Status: CANCELLED | OUTPATIENT
Start: 2021-10-25

## 2021-10-11 RX ADMIN — SODIUM CHLORIDE 300 MG: 9 INJECTION, SOLUTION INTRAVENOUS at 10:40

## 2021-10-11 NOTE — LETTER
10/11/2021         RE: Xochitl Caputo  [address]  [address]  [facility] [address]        Dear Colleague,    Thank you for referring your patient, Xochitl Caputo, to the [facility]. Please see a copy of my visit note below.    Chief Complaint   Patient presents with     Infusion     IV Remicade     Infusion Nursing Note:  Xochitl Caputo presents today for IV Remicade.    Patient seen by provider today: No   present during visit today: Not Applicable.    Note: Remicade given over 2 hours.    Labs: labs drawn per orders.     Intravenous Access:  Peripheral IV placed.    Treatment Conditions:  Biological Infusion Checklist:  ~~~ NOTE: If the patient answers yes to any of the questions below, hold the infusion and contact ordering provider or on-call provider.    1. Have you recently had an elevated temperature, fever, chills, productive cough, coughing for 3 weeks or longer or hemoptysis, abnormal vital signs, night sweats,  chest pain or have you noticed a decrease in your appetite, unexplained weight loss or fatigue? No  2. Do you have any open wounds or new incisions? No  3. Do you have any recent or upcoming hospitalizations, surgeries or dental procedures? No  4. Do you currently have or recently have had any signs of illness or infection or are you on any antibiotics? No  5. Have you had any new, sudden or worsening abdominal pain? No  6. Have you or anyone in your household received a live vaccination in the past 4 weeks? Please note:  No live vaccines while on biologic/chemotherapy until 6 months after the last treatment.  Patient can receive the flu vaccine (shot only) and the pneumovax.  It is optimal for the patient to get these vaccines mid cycle, but they can be given at any time as long as it is not on the day of the infusion. No  7. Have you recently been diagnosed with any new nervous system diseases (ie. Multiple sclerosis,  "Guillain [place], seizures, neurological changes) or cancer diagnosis? No  8. Are you on any form of radiation or chemotherapy? No  9. Are you pregnant or breast feeding or do you have plans of pregnancy in the future? No  10. Have you been having any signs of worsening depression or suicidal ideations?  (benlysta only) No  11. Have there been any other new onset medical symptoms? No    Post Infusion Assessment:  Patient tolerated infusion without incident.  Site patent and intact, free from redness, edema or discomfort.  No evidence of extravasations.  Access discontinued per protocol.     POST-INFUSION OF BIOLOGICAL MEDICATION:  Reviewed with patient.  Given biologic medication or medication hand-out. Inform patient if any fever, chills or signs of infection, new symptoms, abdominal pain, heart palpitations, shortness of breath, reaction, weakness, neurological changes, seek medical attention immediately and should not receive infusions. No live virus vaccines prior to or during treatment or up to 6 months post infusion. If the patient has an upcoming procedure or surgery, this should be discussed with the rheumatologist and surgeon or provider.    Discharge Plan:   [facility]  Patient will return 12/6 for next appointment.   Patient discharged in stable condition accompanied by: self.  Departure Mode: Ambulatory.    Administrations This Visit     inFLIXimab-dyyb (INFLECTRA) 300 mg in sodium chloride 0.9 % 305 mL infusion     Admin Date  10/11/2021 Action  New Bag Dose  300 mg Rate  152.5 mL/hr Route  Intravenous Administered By  Tyrone Cueto RN                Vital signs:  Temp: 97.7  F (36.5  C) Temp src: Oral BP: 110/74 Pulse: 80   Resp: 18 SpO2: 98 % O2 Device: None (Room air)     Weight: 64.5 kg (142 lb 4.8 oz)  Estimated body mass index is 22.29 kg/m  as calculated from the following:    Height as of 8/17/21: 1.702 m (5' 7\").    Weight as of this encounter: 64.5 kg (142 lb 4.8 " monica).                Again, thank you for allowing me to participate in the care of your patient.        Sincerely,        [facility]

## 2021-10-11 NOTE — PROGRESS NOTES
Chief Complaint   Patient presents with     Infusion     IV Remicade     Infusion Nursing Note:  Xochitl Caputo presents today for IV Remicade.    Patient seen by provider today: No   present during visit today: Not Applicable.    Note: Remicade given over 2 hours.    Labs: labs drawn per orders.     Intravenous Access:  Peripheral IV placed.    Treatment Conditions:  Biological Infusion Checklist:  ~~~ NOTE: If the patient answers yes to any of the questions below, hold the infusion and contact ordering provider or on-call provider.    1. Have you recently had an elevated temperature, fever, chills, productive cough, coughing for 3 weeks or longer or hemoptysis, abnormal vital signs, night sweats,  chest pain or have you noticed a decrease in your appetite, unexplained weight loss or fatigue? No  2. Do you have any open wounds or new incisions? No  3. Do you have any recent or upcoming hospitalizations, surgeries or dental procedures? No  4. Do you currently have or recently have had any signs of illness or infection or are you on any antibiotics? No  5. Have you had any new, sudden or worsening abdominal pain? No  6. Have you or anyone in your household received a live vaccination in the past 4 weeks? Please note:  No live vaccines while on biologic/chemotherapy until 6 months after the last treatment.  Patient can receive the flu vaccine (shot only) and the pneumovax.  It is optimal for the patient to get these vaccines mid cycle, but they can be given at any time as long as it is not on the day of the infusion. No  7. Have you recently been diagnosed with any new nervous system diseases (ie. Multiple sclerosis, Guillain [place], seizures, neurological changes) or cancer diagnosis? No  8. Are you on any form of radiation or chemotherapy? No  9. Are you pregnant or breast feeding or do you have plans of pregnancy in the future? No  10. Have you been having any signs of worsening depression or suicidal  "ideations?  (benlysta only) No  11. Have there been any other new onset medical symptoms? No    Post Infusion Assessment:  Patient tolerated infusion without incident.  Site patent and intact, free from redness, edema or discomfort.  No evidence of extravasations.  Access discontinued per protocol.     POST-INFUSION OF BIOLOGICAL MEDICATION:  Reviewed with patient.  Given biologic medication or medication hand-out. Inform patient if any fever, chills or signs of infection, new symptoms, abdominal pain, heart palpitations, shortness of breath, reaction, weakness, neurological changes, seek medical attention immediately and should not receive infusions. No live virus vaccines prior to or during treatment or up to 6 months post infusion. If the patient has an upcoming procedure or surgery, this should be discussed with the rheumatologist and surgeon or provider.    Discharge Plan:   AVS to patient via ThinkLinkHART.  Patient will return 12/6 for next appointment.   Patient discharged in stable condition accompanied by: self.  Departure Mode: Ambulatory.    Administrations This Visit     inFLIXimab-dyyb (INFLECTRA) 300 mg in sodium chloride 0.9 % 305 mL infusion     Admin Date  10/11/2021 Action  New Bag Dose  300 mg Rate  152.5 mL/hr Route  Intravenous Administered By  Tyrone Cueot RN                Vital signs:  Temp: 97.7  F (36.5  C) Temp src: Oral BP: 110/74 Pulse: 80   Resp: 18 SpO2: 98 % O2 Device: None (Room air)     Weight: 64.5 kg (142 lb 4.8 oz)  Estimated body mass index is 22.29 kg/m  as calculated from the following:    Height as of 8/17/21: 1.702 m (5' 7\").    Weight as of this encounter: 64.5 kg (142 lb 4.8 oz).            "

## 2021-10-19 LAB — SCANNED LAB RESULT: NORMAL

## 2021-10-24 ENCOUNTER — HEALTH MAINTENANCE LETTER (OUTPATIENT)
Age: 26
End: 2021-10-24

## 2021-10-29 ENCOUNTER — PATIENT OUTREACH (OUTPATIENT)
Dept: GASTROENTEROLOGY | Facility: CLINIC | Age: 26
End: 2021-10-29

## 2021-10-29 RX ORDER — METHYLPREDNISOLONE SODIUM SUCCINATE 125 MG/2ML
32 INJECTION, POWDER, LYOPHILIZED, FOR SOLUTION INTRAMUSCULAR; INTRAVENOUS ONCE
Status: CANCELLED
Start: 2021-11-08 | End: 2021-11-08

## 2021-10-29 RX ORDER — METHYLPREDNISOLONE SODIUM SUCCINATE 125 MG/2ML
32 INJECTION, POWDER, LYOPHILIZED, FOR SOLUTION INTRAMUSCULAR; INTRAVENOUS ONCE
Status: CANCELLED
Start: 2021-10-29 | End: 2021-10-29

## 2021-10-29 NOTE — PROGRESS NOTES
Poonam Dunbar MD Koch, Noelle, RN  Can you please place a PA for Remicade q4w?                 Order placed for the patient's Remicade dosing to change to every 4 weeks will request insurance coverage

## 2021-12-06 ENCOUNTER — INFUSION THERAPY VISIT (OUTPATIENT)
Dept: INFUSION THERAPY | Facility: CLINIC | Age: 26
End: 2021-12-06
Payer: COMMERCIAL

## 2021-12-06 VITALS
SYSTOLIC BLOOD PRESSURE: 111 MMHG | WEIGHT: 147.6 LBS | RESPIRATION RATE: 16 BRPM | DIASTOLIC BLOOD PRESSURE: 67 MMHG | BODY MASS INDEX: 23.12 KG/M2 | OXYGEN SATURATION: 100 % | TEMPERATURE: 98.1 F | HEART RATE: 79 BPM

## 2021-12-06 DIAGNOSIS — K51.911 ULCERATIVE COLITIS WITH RECTAL BLEEDING, UNSPECIFIED LOCATION (H): Primary | ICD-10-CM

## 2021-12-06 PROCEDURE — 258N000003 HC RX IP 258 OP 636: Performed by: INTERNAL MEDICINE

## 2021-12-06 PROCEDURE — 96365 THER/PROPH/DIAG IV INF INIT: CPT

## 2021-12-06 PROCEDURE — 250N000011 HC RX IP 250 OP 636: Performed by: INTERNAL MEDICINE

## 2021-12-06 RX ORDER — METHYLPREDNISOLONE SODIUM SUCCINATE 125 MG/2ML
32 INJECTION, POWDER, LYOPHILIZED, FOR SOLUTION INTRAMUSCULAR; INTRAVENOUS ONCE
Status: CANCELLED
Start: 2022-01-03 | End: 2022-01-03

## 2021-12-06 RX ORDER — DIPHENHYDRAMINE HCL 25 MG
25 CAPSULE ORAL ONCE
Status: CANCELLED
Start: 2022-01-03 | End: 2022-01-03

## 2021-12-06 RX ORDER — NALOXONE HYDROCHLORIDE 0.4 MG/ML
0.2 INJECTION, SOLUTION INTRAMUSCULAR; INTRAVENOUS; SUBCUTANEOUS
Status: CANCELLED | OUTPATIENT
Start: 2022-01-03

## 2021-12-06 RX ORDER — ACETAMINOPHEN 325 MG/1
650 TABLET ORAL ONCE
Status: CANCELLED
Start: 2022-01-03 | End: 2022-01-03

## 2021-12-06 RX ORDER — ALBUTEROL SULFATE 0.83 MG/ML
2.5 SOLUTION RESPIRATORY (INHALATION)
Status: CANCELLED | OUTPATIENT
Start: 2022-01-03

## 2021-12-06 RX ORDER — METHYLPREDNISOLONE SODIUM SUCCINATE 125 MG/2ML
125 INJECTION, POWDER, LYOPHILIZED, FOR SOLUTION INTRAMUSCULAR; INTRAVENOUS
Status: CANCELLED
Start: 2022-01-03

## 2021-12-06 RX ORDER — DIPHENHYDRAMINE HYDROCHLORIDE 50 MG/ML
50 INJECTION INTRAMUSCULAR; INTRAVENOUS
Status: CANCELLED
Start: 2022-01-03

## 2021-12-06 RX ORDER — ALBUTEROL SULFATE 90 UG/1
1-2 AEROSOL, METERED RESPIRATORY (INHALATION)
Status: CANCELLED
Start: 2022-01-03

## 2021-12-06 RX ORDER — MEPERIDINE HYDROCHLORIDE 25 MG/ML
25 INJECTION INTRAMUSCULAR; INTRAVENOUS; SUBCUTANEOUS EVERY 30 MIN PRN
Status: CANCELLED | OUTPATIENT
Start: 2022-01-03

## 2021-12-06 RX ORDER — EPINEPHRINE 1 MG/ML
0.3 INJECTION, SOLUTION INTRAMUSCULAR; SUBCUTANEOUS EVERY 5 MIN PRN
Status: CANCELLED | OUTPATIENT
Start: 2022-01-03

## 2021-12-06 RX ADMIN — SODIUM CHLORIDE 300 MG: 9 INJECTION, SOLUTION INTRAVENOUS at 09:47

## 2021-12-06 RX ADMIN — SODIUM CHLORIDE 50 ML: 9 INJECTION, SOLUTION INTRAVENOUS at 09:48

## 2021-12-06 NOTE — PROGRESS NOTES
Nursing Note  Xochitl Caputo presents today to [facility] for:   Chief Complaint   Patient presents with     Infusion     During today's [facility] appointment, orders from Dr. Dunbar were completed.  Frequency: every 28 days    Progress note:  Patient identification verified by name and date of birth.  Assessment completed.  Vitals recorded in Doc Flowsheets.  Patient was provided with education regarding medication/procedure and possible side effects.  Patient verbalized understanding.     present during visit today: Not Applicable.    Treatment Conditions: Patient denies fever, chills, signs of infection, recent illness, antibiotics use, productive cough or elevated temperature.    Premedications: historically patient has not taken pre-medications prior to infusion.  Drug Waste Record: No  Infusion length and rate:  305 ml/hr., over 1 hour per pharmacy  Labs: were not ordered for this appointment.  Vascular access: peripheral IV placed today.    Is the next appt scheduled? No, message sent to scheduling. Patient thought appts were supposed to be every 8 weeks but therapy plan states every 28 days and every 4 weeks. Message sent to Dr. Dunbar for clarification.  Asymptomatic COVID test completed? no    Post Infusion Assessment:  Patient tolerated infusion without incident.  Site patent and intact, free from redness, edema or discomfort.  No evidence of extravasations.  Access discontinued per protocol.     Discharge Plan:   Follow up plan of care with: ongoing infusions at [facility].  Discharge instructions were reviewed with patient.  Patient/representative verbalized understanding of discharge instructions and all questions answered.  Patient discharged from [facility] in stable condition.    Belkys Lucai RN    Administrations This Visit     0.9% sodium chloride BOLUS     Admin  "Date  12/06/2021 Action  New Bag Dose  50 mL Route  Intravenous Administered By  Belkys Lucia RN          inFLIXimab-dyyb (INFLECTRA) 300 mg in sodium chloride 0.9 % 305 mL infusion     Admin Date  12/06/2021 Action  New Bag Dose  300 mg Rate  305 mL/hr Route  Intravenous Administered By  Belkys Lucia RN                /69   Pulse 79   Temp 98.1  F (36.7  C) (Oral)   Resp 16   Wt 67 kg (147 lb 9.6 oz)   SpO2 100%   BMI 23.12 kg/m       PRIOR TO INFUSION OF BIOLOGICAL MEDICATIONS OR ANY OF THESE AS LISTED: Remicaide (infliximab) \".rheumbiologicalchecklist\"    Prior to Infusion of biological medications or any of these as listed:    1. Elevated temperature, fever, chills, productive cough or abnormal vital signs, night sweats, coughing up blood or sputum, no appetite or abnormal vital signs : NO    2. Open wounds or new incisions: NO    3. Recent hospitalization: NO    4.  Recent surgeries:  NO    5. Any upcoming surgeries or dental procedures?:NO    6. Any current or recent bouts of illness or infection? On any antibiotics? : NO    7. Any new, sudden or worsening abdominal pain :NO    8. Vaccination within 4 weeks? Patient or someone in the household is scheduled to receive vaccination? No live virus vaccines prior to or during treatment :NO    9. Any nervous system diseases [i.e. multiple sclerosis, Guillain-[place], seizures, neurological  changes]: NO    10. Pregnant or breast feeding; or plans on pregnancy in the future: NO    11. Signs of worsening depression or suicidal ideations while taking benlysta:NO    12. New-onset medical symptoms: NO    13.  New cancer diagnosis or on chemotherapy or radiation NO    14.  Evaluate for any sign of active TB [Unexplained weight loss, Loss of appetite, Night sweats, Fever, Fatigue, Chills, Coughing for 3 weeks or longer, Hemoptysis (coughing up blood), Chest pain]: NO    **Note: If answered yes to any of the above, hold the infusion and contact " ordering rheumatologist or on-call rheumatologist.

## 2021-12-17 ENCOUNTER — VIRTUAL VISIT (OUTPATIENT)
Dept: PHARMACY | Facility: CLINIC | Age: 26
End: 2021-12-17
Payer: COMMERCIAL

## 2021-12-17 DIAGNOSIS — K51.911 ULCERATIVE COLITIS WITH RECTAL BLEEDING, UNSPECIFIED LOCATION (H): Primary | ICD-10-CM

## 2021-12-17 PROCEDURE — 99207 PR NO CHARGE LOS: CPT | Performed by: PHARMACIST

## 2021-12-17 NOTE — Clinical Note
Xochitl Coronel is wondering your personal thoughts on risk of IBD flare with the covid booster. She felt she had a clinically significant flare with her second dose. I reviewed the data I am aware of, but she wanted your thoughts. I am also wondering your thoughts on a DEXA scan given her recent steroid exposure. Thank you! Nina

## 2021-12-17 NOTE — PROGRESS NOTES
Clinical Pharmacy Consult:                                                    Xochitl Caputo is a 26 year old female called for a clinical pharmacist consult.  She was referred to me from Dr. Dunbar.    Xochitl is not seen for CMR today due to coverage.    Reason for Consult: IBD health maintenance review on infliximab    Discussion:     Notes she had the delta variant in August and she is concerned about getting another COVID-19 dose. She was really sick for a long time and is a good place now. She notes that the vaccine made her flare and she doesn't want to end up in that place again.      IBD Health Care Maintenance:     Vaccinations:  All patients on biologics should avoid live vaccines.    -- Influenza (every year) due for 21-22 season  -- TdaP (every 10 years) last 12/29/2016, due 12/2026  -- Pneumococcal Pneumonia (once plus booster at 5 years)              - Prevnar-13 5/6/2019              - Pneumovax-23 not on file  -- COVID-19 3/9/21 and 3/31/21  -- Yearly assessment for latent Tb (verbal screening and exam, PPD or QuantiFERON-Tb testing)              - negative 11/2020     One time confirmation of immunity or serologies:  -- Hepatitis A (serologies or immunizations) vaccinated 8/2/2010 and 6/17/2011  -- Hepatitis B (serologies or immunizations) vaccinated 9/1994, 11/994, and 6/1995  -- Varicella vaccinated 9/1995 and 11/2008  -- MMR second dose 5/22/2000  -- HPV (all aged 18-26)  vaccination complete 6/2011  -- Meningococcal meningitis (all patients at risk for meningitis)-- second dose 8/2015     Due to the immunosuppression in this patient, I would not advise administration of live vaccines such as varicella/VZV, intranasal influenza, MMR, or yellow fever vaccine (if traveling).       Bone mineral density screening   -- Recommend all patients supplement with calcium and vitamin D  -- Given prior steroid use recommend DEXA if not already done              - no prior DEXA per her report, was on steroid  taper this year for flare           Cancer Screening:  Cervical cancer screening: Annual due to immunosupression     Skin cancer screening: Annual visual exam of skin by dermatologist since patient is immunocompromised     Depression Screening:  -- Over the last month, have you felt down, depressed, or hopeless? No  -- Over the last month, have you felt little interest or pleasure doing things? No    Misc:  -- Avoid tobacco use  -- Avoid NSAIDs as there is potentially a 25% chance of causing an IBD flare    Current Supplements:  Multivitamin   truvia tumeric  phytopre (pomegranate fruit extract)  martínez MELGOZA (concentrated burbine formula)  orthospore IG (probiotic)  orthobiotic 100  paramicrocidin 250 (grapefruit extract)  biosidian   DHEA    D3     She would like DHEA added to her list,    Plan:  1. Xochitl to consider the following vaccines:   -- COVID-19 third dose (would like Dr. Dunbar's thoughts as well)   -- Pneumovax-23   -- Shingrix series (patient not interested at this time)   -- seasonal influenza vaccine      2. Future quantiferon ordered per clinic protocol    3. Considerations for Dr. Dunbar   -- DEXA

## 2021-12-20 RX ORDER — ANTIARTHRITIC COMBINATION NO.2 900 MG
25 TABLET ORAL DAILY
COMMUNITY

## 2021-12-21 NOTE — PATIENT INSTRUCTIONS
It was nice speaking with you!    Plan:  1. Xochitl to consider the following vaccines:   -- COVID-19 third dose (would like Dr. Dunbar's thoughts as well)   -- Pneumovax-23   -- Shingrix series (not interested at this time)   -- seasonal influenza vaccine      2. Future quantiferon (tuberculosis screening) ordered. You can get this done at your convenience.    3. Considerations for Dr. Dunbar   -- DEXA scan (bones)    Nina Escalona PharmD, BCACP  MTM Pharmacist   [facility]  Phone: [phone]

## 2022-01-31 ENCOUNTER — INFUSION THERAPY VISIT (OUTPATIENT)
Dept: INFUSION THERAPY | Facility: CLINIC | Age: 27
End: 2022-01-31
Attending: INTERNAL MEDICINE
Payer: COMMERCIAL

## 2022-01-31 ENCOUNTER — TELEPHONE (OUTPATIENT)
Dept: GASTROENTEROLOGY | Facility: CLINIC | Age: 27
End: 2022-01-31

## 2022-01-31 VITALS
BODY MASS INDEX: 24.7 KG/M2 | TEMPERATURE: 98.2 F | WEIGHT: 157.7 LBS | SYSTOLIC BLOOD PRESSURE: 103 MMHG | OXYGEN SATURATION: 100 % | DIASTOLIC BLOOD PRESSURE: 72 MMHG | HEART RATE: 74 BPM

## 2022-01-31 DIAGNOSIS — K51.911 ULCERATIVE COLITIS WITH RECTAL BLEEDING, UNSPECIFIED LOCATION (H): Primary | ICD-10-CM

## 2022-01-31 LAB
ALBUMIN SERPL-MCNC: 3.7 G/DL (ref 3.4–5)
ALP SERPL-CCNC: 65 U/L (ref 40–150)
ALT SERPL W P-5'-P-CCNC: 21 U/L (ref 0–50)
ANION GAP SERPL CALCULATED.3IONS-SCNC: 4 MMOL/L (ref 3–14)
AST SERPL W P-5'-P-CCNC: 17 U/L (ref 0–45)
BASOPHILS # BLD AUTO: 0.1 10E3/UL (ref 0–0.2)
BASOPHILS NFR BLD AUTO: 1 %
BILIRUB DIRECT SERPL-MCNC: <0.1 MG/DL (ref 0–0.2)
BILIRUB SERPL-MCNC: 0.4 MG/DL (ref 0.2–1.3)
BUN SERPL-MCNC: 11 MG/DL (ref 7–30)
CALCIUM SERPL-MCNC: 8.6 MG/DL (ref 8.5–10.1)
CHLORIDE BLD-SCNC: 107 MMOL/L (ref 94–109)
CO2 SERPL-SCNC: 25 MMOL/L (ref 20–32)
CREAT SERPL-MCNC: 0.57 MG/DL (ref 0.52–1.04)
CRP SERPL-MCNC: <2.9 MG/L (ref 0–8)
EOSINOPHIL # BLD AUTO: 0.4 10E3/UL (ref 0–0.7)
EOSINOPHIL NFR BLD AUTO: 5 %
ERYTHROCYTE [DISTWIDTH] IN BLOOD BY AUTOMATED COUNT: 15.6 % (ref 10–15)
ERYTHROCYTE [SEDIMENTATION RATE] IN BLOOD BY WESTERGREN METHOD: 11 MM/HR (ref 0–20)
GFR SERPL CREATININE-BSD FRML MDRD: >90 ML/MIN/1.73M2
GLUCOSE BLD-MCNC: 112 MG/DL (ref 70–99)
HCT VFR BLD AUTO: 37.1 % (ref 35–47)
HGB BLD-MCNC: 12.4 G/DL (ref 11.7–15.7)
IMM GRANULOCYTES # BLD: 0 10E3/UL
IMM GRANULOCYTES NFR BLD: 0 %
LYMPHOCYTES # BLD AUTO: 3 10E3/UL (ref 0.8–5.3)
LYMPHOCYTES NFR BLD AUTO: 36 %
MCH RBC QN AUTO: 30 PG (ref 26.5–33)
MCHC RBC AUTO-ENTMCNC: 33.4 G/DL (ref 31.5–36.5)
MCV RBC AUTO: 90 FL (ref 78–100)
MONOCYTES # BLD AUTO: 0.8 10E3/UL (ref 0–1.3)
MONOCYTES NFR BLD AUTO: 10 %
NEUTROPHILS # BLD AUTO: 4.1 10E3/UL (ref 1.6–8.3)
NEUTROPHILS NFR BLD AUTO: 48 %
NRBC # BLD AUTO: 0 10E3/UL
NRBC BLD AUTO-RTO: 0 /100
PLATELET # BLD AUTO: 292 10E3/UL (ref 150–450)
POTASSIUM BLD-SCNC: 4.3 MMOL/L (ref 3.4–5.3)
PROT SERPL-MCNC: 7.1 G/DL (ref 6.8–8.8)
RBC # BLD AUTO: 4.13 10E6/UL (ref 3.8–5.2)
SODIUM SERPL-SCNC: 136 MMOL/L (ref 133–144)
WBC # BLD AUTO: 8.4 10E3/UL (ref 4–11)

## 2022-01-31 PROCEDURE — 96365 THER/PROPH/DIAG IV INF INIT: CPT

## 2022-01-31 PROCEDURE — 82248 BILIRUBIN DIRECT: CPT | Performed by: INTERNAL MEDICINE

## 2022-01-31 PROCEDURE — 85025 COMPLETE CBC W/AUTO DIFF WBC: CPT | Performed by: INTERNAL MEDICINE

## 2022-01-31 PROCEDURE — 80053 COMPREHEN METABOLIC PANEL: CPT | Performed by: INTERNAL MEDICINE

## 2022-01-31 PROCEDURE — 85652 RBC SED RATE AUTOMATED: CPT | Performed by: INTERNAL MEDICINE

## 2022-01-31 PROCEDURE — 86140 C-REACTIVE PROTEIN: CPT | Performed by: INTERNAL MEDICINE

## 2022-01-31 PROCEDURE — 258N000003 HC RX IP 258 OP 636: Performed by: INTERNAL MEDICINE

## 2022-01-31 PROCEDURE — 250N000011 HC RX IP 250 OP 636: Performed by: INTERNAL MEDICINE

## 2022-01-31 PROCEDURE — 36415 COLL VENOUS BLD VENIPUNCTURE: CPT | Performed by: INTERNAL MEDICINE

## 2022-01-31 RX ORDER — MEPERIDINE HYDROCHLORIDE 25 MG/ML
25 INJECTION INTRAMUSCULAR; INTRAVENOUS; SUBCUTANEOUS EVERY 30 MIN PRN
Status: CANCELLED | OUTPATIENT
Start: 2022-02-28

## 2022-01-31 RX ORDER — ALBUTEROL SULFATE 90 UG/1
1-2 AEROSOL, METERED RESPIRATORY (INHALATION)
Status: CANCELLED
Start: 2022-02-28

## 2022-01-31 RX ORDER — DIPHENHYDRAMINE HCL 25 MG
25 CAPSULE ORAL ONCE
Status: CANCELLED
Start: 2022-02-28 | End: 2022-02-28

## 2022-01-31 RX ORDER — NALOXONE HYDROCHLORIDE 0.4 MG/ML
0.2 INJECTION, SOLUTION INTRAMUSCULAR; INTRAVENOUS; SUBCUTANEOUS
Status: CANCELLED | OUTPATIENT
Start: 2022-02-28

## 2022-01-31 RX ORDER — DIPHENHYDRAMINE HYDROCHLORIDE 50 MG/ML
50 INJECTION INTRAMUSCULAR; INTRAVENOUS
Status: CANCELLED
Start: 2022-02-28

## 2022-01-31 RX ORDER — EPINEPHRINE 1 MG/ML
0.3 INJECTION, SOLUTION INTRAMUSCULAR; SUBCUTANEOUS EVERY 5 MIN PRN
Status: CANCELLED | OUTPATIENT
Start: 2022-02-28

## 2022-01-31 RX ORDER — METHYLPREDNISOLONE SODIUM SUCCINATE 125 MG/2ML
32 INJECTION, POWDER, LYOPHILIZED, FOR SOLUTION INTRAMUSCULAR; INTRAVENOUS ONCE
Status: CANCELLED
Start: 2022-02-28 | End: 2022-02-28

## 2022-01-31 RX ORDER — METHYLPREDNISOLONE SODIUM SUCCINATE 125 MG/2ML
125 INJECTION, POWDER, LYOPHILIZED, FOR SOLUTION INTRAMUSCULAR; INTRAVENOUS
Status: CANCELLED
Start: 2022-02-28

## 2022-01-31 RX ORDER — ACETAMINOPHEN 325 MG/1
650 TABLET ORAL ONCE
Status: CANCELLED
Start: 2022-02-28 | End: 2022-02-28

## 2022-01-31 RX ORDER — ALBUTEROL SULFATE 0.83 MG/ML
2.5 SOLUTION RESPIRATORY (INHALATION)
Status: CANCELLED | OUTPATIENT
Start: 2022-02-28

## 2022-01-31 RX ADMIN — SODIUM CHLORIDE 360 MG: 9 INJECTION, SOLUTION INTRAVENOUS at 14:36

## 2022-01-31 NOTE — TELEPHONE ENCOUNTER
----- Message from Alexandra Houston RN sent at 12/6/2021  4:31 PM CST -----  Regarding: RE: clarification on frequency of inflicimab  Called the patient she would like to keep her infusions every 8 weeks orders have been updated. She states she is feeling well and does not want to change dosing at this time.  Team aware     Alexandra  ----- Message -----  From: Belkys Lucia RN  Sent: 12/6/2021   4:24 PM CST  To: Poonam Dunbar MD, Alexandra Houston RN  Subject: RE: clarification on frequency of inflicimab     Hello,    Ok well should I cancel her monthly appointments and make it every 8 weeks? Patient was pretty adamant that she wanted to come every 2 weeks.    Belkys Lucia RN  [facility]  Phone [phone]    ----- Message -----  From: Alexandra Houston RN  Sent: 12/6/2021   4:22 PM CST  To: Poonam Dunbar MD, Alexandra Houston RN, #  Subject: RE: clarification on frequency of inflicimab     She is approved for every 4 week infusions since 11/9.  She was informed on 10/20 by a my chart message from Dr Dunbar  that the infusion frequency would change.  I will give her a call    Alexandra  ----- Message -----  From: Poonam Dunbar MD  Sent: 12/6/2021   4:05 PM CST  To: Alexandra Houston RN  Subject: FW: clarification on frequency of inflicimab     Did we increase her infusion frequency?  ----- Message -----  From: Belkys Lucia RN  Sent: 12/6/2021  11:02 AM CST  To: Poonam Dunbar MD  Subject: clarification on frequency of inflicimab         Good morning,    I was taking care of Xochitl today for her fourth infliximab infusion. She was under the impression that she would be receiving this every 8 weeks, but the orders state every 28 days. Can you clarify?    Thank you,  Belkys Lucia RN  [facility]  Phone [phone]

## 2022-01-31 NOTE — LETTER
1/31/2022         RE: Xochitl Caputo  [address]  [address]  [place] [address]        Dear Colleague,    Thank you for referring your patient, Xochitl Caputo, to the [facility]. Please see a copy of my visit note below.    Nursing Note  [facility]l Caputo [facility] for:   Chief Complaint   Patient presents with     Infusion     remicade     During [facility]'s [facility] appointment, orders from Dr. Dunbar were completed.  Frequency: every 8 weeks; order says every 4 weeks. Pt reports she is to be Q8 weeks; message sent to care team to correct order.     Progress note:  Patient identification verified by name and date of birth.  Assessment completed.  Vitals recorded in Doc Flowsheets.  Patient was provided with education regarding medication/procedure and possible side effects.  Patient verbalized understanding.     present during visit today: Not Applicable.    Treatment Conditions: ~~~ NOTE: If the patient answers yes to any of the questions below, hold the infusion and contact ordering provider or on-call provider.    1. Have you recently had an elevated temperature, fever, chills, productive cough, coughing for 3 weeks or longer or hemoptysis, abnormal vital signs, night sweats,  chest pain or have you noticed a decrease in your appetite, unexplained weight loss or fatigue? No  2. Do you have any open wounds or new incisions? No  3. Do you have any recent or upcoming hospitalizations, surgeries or dental procedures? No  4. Do you currently have or recently have had any signs of illness or infection or are you on any antibiotics? No  5. Have you had any new, sudden or worsening abdominal pain? No  6. Have you or anyone in your household received a live vaccination in the past 4 weeks? Please note:  No live vaccines while on biologic/chemotherapy until 6 months after the  last treatment.  Patient can receive the flu vaccine (shot only) and the pneumovax.  It is optimal for the patient to get these vaccines mid cycle, but they can be given at any time as long as it is not on the day of the infusion. No  7. Have you recently been diagnosed with any new nervous system diseases (ie. Multiple sclerosis, Guillain [place], seizures, neurological changes) or cancer diagnosis? No  8. Are you on any form of radiation or chemotherapy? No  9. Are you pregnant or breast feeding or do you have plans of pregnancy in the future? No  10. Have you been having any signs of worsening depression or suicidal ideations?  (benlysta only) No  11. Have there been any other new onset medical symptoms? No      Premedications: were not ordered.    Drug Waste Record: No    Infusion length and rate:  infusion given over approximately 60 minutes  311 ml/hr.    Labs: were drawn per orders.     Vascular access: peripheral IV placed today.    Is the next appt scheduled? Yes, message sent  Asymptomatic COVID test completed? no    Post Infusion Assessment:  Patient tolerated infusion without incident.     Discharge Plan:   Follow up plan of care with: ongoing infusions at [facility]. and primary care provider,  Discharge instructions were reviewed with patient.  Patient/representative verbalized understanding of discharge instructions and all questions answered.  Patient discharged from [facility] in stable condition.    Rena Goldstein RN     Administrations This Visit     inFLIXimab-dyyb (INFLECTRA) 360 mg in sodium chloride 0.9 % 311 mL infusion     Admin Date  01/31/2022 Action  New Bag Dose  360 mg Rate  311 mL/hr Route  Intravenous Administered By  Rena Goldstein RN              /73   Pulse 82   Temp 98.2  F (36.8  C) (Oral)   Wt 71.5 kg (157 lb 11.2 oz)   SpO2 100%   BMI 24.70 kg/m          Again, thank you for allowing me to participate in the care  of your patient.      Sincerely,    [facility]

## 2022-01-31 NOTE — PROGRESS NOTES
Impression: Unspecified strabismus: H50.9.  Plan: as child had Strabismus Nursing Note  Xochitl Caputo presents [facility] for:   Chief Complaint   Patient presents with     Infusion     remicade     During [facility]'s [facility] appointment, orders from Dr. Dunbar were completed.  Frequency: every 8 weeks; order says every 4 weeks. Pt reports she is to be Q8 weeks; message sent to care team to correct order.     Progress note:  Patient identification verified by name and date of birth.  Assessment completed.  Vitals recorded in Doc Flowsheets.  Patient was provided with education regarding medication/procedure and possible side effects.  Patient verbalized understanding.     present during visit today: Not Applicable.    Treatment Conditions: ~~~ NOTE: If the patient answers yes to any of the questions below, hold the infusion and contact ordering provider or on-call provider.    1. Have you recently had an elevated temperature, fever, chills, productive cough, coughing for 3 weeks or longer or hemoptysis, abnormal vital signs, night sweats,  chest pain or have you noticed a decrease in your appetite, unexplained weight loss or fatigue? No  2. Do you have any open wounds or new incisions? No  3. Do you have any recent or upcoming hospitalizations, surgeries or dental procedures? No  4. Do you currently have or recently have had any signs of illness or infection or are you on any antibiotics? No  5. Have you had any new, sudden or worsening abdominal pain? No  6. Have you or anyone in your household received a live vaccination in the past 4 weeks? Please note:  No live vaccines while on biologic/chemotherapy until 6 months after the last treatment.  Patient can receive the flu vaccine (shot only) and the pneumovax.  It is optimal for the patient to get these vaccines mid cycle, but they can be given at any time as long as it is not on the day of the infusion. No  7. Have you recently been diagnosed with any new  nervous system diseases (ie. Multiple sclerosis, Guillain [place], seizures, neurological changes) or cancer diagnosis? No  8. Are you on any form of radiation or chemotherapy? No  9. Are you pregnant or breast feeding or do you have plans of pregnancy in the future? No  10. Have you been having any signs of worsening depression or suicidal ideations?  (benlysta only) No  11. Have there been any other new onset medical symptoms? No      Premedications: were not ordered.    Drug Waste Record: No    Infusion length and rate:  infusion given over approximately 60 minutes  311 ml/hr.    Labs: were drawn per orders.     Vascular access: peripheral IV placed today.    Is the next appt scheduled? Yes, message sent  Asymptomatic COVID test completed? no    Post Infusion Assessment:  Patient tolerated infusion without incident.     Discharge Plan:   Follow up plan of care with: ongoing infusions at [facility]. and primary care provider,  Discharge instructions were reviewed with patient.  Patient/representative verbalized understanding of discharge instructions and all questions answered.  Patient discharged from [facility] in stable condition.    Rena Goldstein RN     Administrations This Visit     inFLIXimab-dyyb (INFLECTRA) 360 mg in sodium chloride 0.9 % 311 mL infusion     Admin Date  01/31/2022 Action  New Bag Dose  360 mg Rate  311 mL/hr Route  Intravenous Administered By  Rena Goldstein RN                    /73   Pulse 82   Temp 98.2  F (36.8  C) (Oral)   Wt 71.5 kg (157 lb 11.2 oz)   SpO2 100%   BMI 24.70 kg/m

## 2022-02-13 ENCOUNTER — HEALTH MAINTENANCE LETTER (OUTPATIENT)
Age: 27
End: 2022-02-13

## 2022-03-25 NOTE — PROGRESS NOTES
Called Xochitl again today in regards to her infusions. Her insurance is dictating [facility].  Orders signed [facility] to [facility]     She understand she will be dleayed about a week for her infusions.

## 2022-04-14 ENCOUNTER — PATIENT OUTREACH (OUTPATIENT)
Dept: GASTROENTEROLOGY | Facility: CLINIC | Age: 27
End: 2022-04-14
Payer: COMMERCIAL

## 2022-04-14 NOTE — PROGRESS NOTES
Received a call from [facility] and was informed that [facility] pharmacy has to supply her inflectra so they will be unable to provide services for her.      I called Xochitl to discuss her infusions and I left message for her to call me back as she is already late for her infusion.     Xochitl and I spoke ans she has decided to go with her new insurance and infuse at home through [facility] the referral was sent

## 2022-04-19 NOTE — PROGRESS NOTES
Spoke to Xochitl today in regards to her infusions, she would like to use [employer] as her primary insurance and move to [facility] for subsequent infusions.     Contacted [facility] to discuss the patient and a PA through [facility] weill be started.    Patients is aware that she will need to call [employer] ans select them at her primary insurance moving forward.

## 2022-04-20 ENCOUNTER — HOME INFUSION (PRE-WILLOW HOME INFUSION) (OUTPATIENT)
Dept: PHARMACY | Facility: CLINIC | Age: 27
End: 2022-04-20
Payer: COMMERCIAL

## 2022-04-21 NOTE — PROGRESS NOTES
This is a recent snapshot of the patient's [facility] medical record.  For current drug dose and complete information and questions, call [phone] or In [facility] pool, [facility] home infusion ([id number])  CSN Number:  [id number]

## 2022-04-22 ENCOUNTER — CARE COORDINATION (OUTPATIENT)
Dept: PHARMACY | Facility: CLINIC | Age: 27
End: 2022-04-22
Payer: COMMERCIAL

## 2022-04-22 ENCOUNTER — HOME INFUSION (PRE-WILLOW HOME INFUSION) (OUTPATIENT)
Dept: PHARMACY | Facility: CLINIC | Age: 27
End: 2022-04-22
Payer: COMMERCIAL

## 2022-04-22 ENCOUNTER — PATIENT OUTREACH (OUTPATIENT)
Dept: GASTROENTEROLOGY | Facility: CLINIC | Age: 27
End: 2022-04-22
Payer: COMMERCIAL

## 2022-04-22 NOTE — PROGRESS NOTES
Called Home infusion to see where in the process her infusion PA was.  I was informed that her PA has been approved for 5 mg /kg every 4 weeks and they will call the patient and set up a visit with her in the next couple of days as the patient is late for her Remicade.     Patient does not wish to infuse at every 4 weeks at this time as she feels that she is being covered well at every 8 week dosing.  patient is aware that she has an authorization through her insurance to infuse every 4 weeks and is choosing to stay at every 8 week dosing at this time. Home infusion aware of dosing schedule

## 2022-04-22 NOTE — PROGRESS NOTES
Referred to [facility]    Xochitl Artis, 1995  Medication (name, frequency and route):  Remicade q4-8 weeks    Start of Care Date: 04/27/2022  (Confirmed with patient)  Infusion location: Patient Home  Skilled Nursing will be provided by: [facility]  @ [phone]    Di Oliveros RN, TOPHERNI

## 2022-04-25 NOTE — PROGRESS NOTES
This is a recent snapshot of the patient's [facility] medical record.  For current drug dose and complete information and questions, call [phone] or In Basket pool, [facility] home infusion ([id number])  CSN Number:  [id number]

## 2022-04-26 ENCOUNTER — HOME INFUSION (PRE-WILLOW HOME INFUSION) (OUTPATIENT)
Dept: PHARMACY | Facility: CLINIC | Age: 27
End: 2022-04-26
Payer: COMMERCIAL

## 2022-04-27 ENCOUNTER — DOCUMENTATION ONLY (OUTPATIENT)
Dept: PHARMACY | Facility: CLINIC | Age: 27
End: 2022-04-27
Payer: COMMERCIAL

## 2022-04-27 ENCOUNTER — HOME INFUSION (PRE-WILLOW HOME INFUSION) (OUTPATIENT)
Dept: PHARMACY | Facility: CLINIC | Age: 27
End: 2022-04-27

## 2022-04-27 NOTE — PROGRESS NOTES
This is a recent snapshot of the patient's [facility] medical record.  For current drug dose and complete information and questions, call [phone] or In Basket pool, [facility] home infusion ([id number])  CSN Number:  [id number]

## 2022-04-29 NOTE — PROGRESS NOTES
Skilled Nurse visit in the Patient Home to administer Remicade (infliximab) 400 mg. No recent elevated temperature, fever, chills, productive cough, coughing for 3 weeks or longer or hemoptysis, abnormal vital signs, night sweats, chest pain. No decrease in your appetite, unexplained weight loss or fatigue. No other new onset medical symptoms. Current weight 150lb. Peripheral IVleft AC, 1 attempt. Infusion completed without complication or reaction. Pt reports therapy iseffective in managing symptoms related to therapy.

## 2022-05-17 ENCOUNTER — DOCUMENTATION ONLY (OUTPATIENT)
Dept: GASTROENTEROLOGY | Facility: CLINIC | Age: 27
End: 2022-05-17
Payer: COMMERCIAL

## 2022-05-17 NOTE — PROGRESS NOTES
Received fax from [facility]. Prescriber orders. Period of service 4/22/2022 Printed and placed in provider folder to sign.

## 2022-05-24 ENCOUNTER — MYC MEDICAL ADVICE (OUTPATIENT)
Dept: GASTROENTEROLOGY | Facility: CLINIC | Age: 27
End: 2022-05-24
Payer: COMMERCIAL

## 2022-05-27 NOTE — CONFIDENTIAL NOTE
Spoke to Xochitl in regards to her my chart message.  Discussed next steps and scheduling.  Patient scheduled for a follow up visit with Dr Dunbar on June 29th at 12 pm

## 2022-06-01 DIAGNOSIS — K51.90 ULCERATIVE COLITIS (H): Primary | ICD-10-CM

## 2022-06-03 ENCOUNTER — DOCUMENTATION ONLY (OUTPATIENT)
Dept: GASTROENTEROLOGY | Facility: CLINIC | Age: 27
End: 2022-06-03
Payer: COMMERCIAL

## 2022-06-03 NOTE — PROGRESS NOTES
Fecal davian kit mailed via [employer] to patient's home address listed in chart.    Mirlande Juares, CMA

## 2022-06-06 ENCOUNTER — LAB (OUTPATIENT)
Dept: LAB | Facility: CLINIC | Age: 27
End: 2022-06-06
Payer: COMMERCIAL

## 2022-06-06 DIAGNOSIS — K51.90 ULCERATIVE COLITIS (H): ICD-10-CM

## 2022-06-06 PROCEDURE — 83993 ASSAY FOR CALPROTECTIN FECAL: CPT

## 2022-06-08 LAB — CALPROTECTIN STL-MCNT: 173 MG/KG (ref 0–49.9)

## 2022-06-17 ENCOUNTER — PATIENT OUTREACH (OUTPATIENT)
Dept: GASTROENTEROLOGY | Facility: CLINIC | Age: 27
End: 2022-06-17
Payer: COMMERCIAL

## 2022-06-17 DIAGNOSIS — K51.011 ULCERATIVE PANCOLITIS WITH RECTAL BLEEDING (H): ICD-10-CM

## 2022-06-17 DIAGNOSIS — K51.90 ULCERATIVE COLITIS (H): Primary | ICD-10-CM

## 2022-06-17 RX ORDER — BUDESONIDE 28 MG/1
AEROSOL, FOAM RECTAL
Qty: 33.4 G | Refills: 0 | Status: SHIPPED | OUTPATIENT
Start: 2022-06-17 | End: 2022-07-29

## 2022-06-17 NOTE — TELEPHONE ENCOUNTER
Returned call from patient  Patient reports that has been having blood in her stools   Reports more blood in her stools then before she was pregnant   Patient is pregnant   Does report external hemorrhoids  Last calprotectin 173  Some constipation   Reports that stools are soft formed to formed   That she does push when having bowel movements  3 to 4 stools a day this has not changed  Next infusion is 2nd   Wondering if she could use budesonide foam   Routed to Dr Dunbar for further direction

## 2022-06-20 ENCOUNTER — HOME INFUSION (PRE-WILLOW HOME INFUSION) (OUTPATIENT)
Dept: PHARMACY | Facility: CLINIC | Age: 27
End: 2022-06-20

## 2022-06-22 ENCOUNTER — HOME INFUSION (PRE-WILLOW HOME INFUSION) (OUTPATIENT)
Dept: PHARMACY | Facility: CLINIC | Age: 27
End: 2022-06-22

## 2022-06-22 ENCOUNTER — LAB REQUISITION (OUTPATIENT)
Dept: LAB | Facility: CLINIC | Age: 27
End: 2022-06-22
Payer: COMMERCIAL

## 2022-06-22 ENCOUNTER — DOCUMENTATION ONLY (OUTPATIENT)
Dept: PHARMACY | Facility: CLINIC | Age: 27
End: 2022-06-22

## 2022-06-22 DIAGNOSIS — K51.90 ULCERATIVE COLITIS, UNSPECIFIED, WITHOUT COMPLICATIONS (H): ICD-10-CM

## 2022-06-22 LAB
ALBUMIN SERPL-MCNC: 3.2 G/DL (ref 3.4–5)
ALP SERPL-CCNC: 47 U/L (ref 40–150)
ALT SERPL W P-5'-P-CCNC: 20 U/L (ref 0–50)
ANION GAP SERPL CALCULATED.3IONS-SCNC: 8 MMOL/L (ref 3–14)
AST SERPL W P-5'-P-CCNC: 16 U/L (ref 0–45)
BASOPHILS # BLD AUTO: 0.1 10E3/UL (ref 0–0.2)
BASOPHILS NFR BLD AUTO: 1 %
BILIRUB DIRECT SERPL-MCNC: <0.1 MG/DL (ref 0–0.2)
BILIRUB SERPL-MCNC: 0.4 MG/DL (ref 0.2–1.3)
BUN SERPL-MCNC: 8 MG/DL (ref 7–30)
CALCIUM SERPL-MCNC: 8.7 MG/DL (ref 8.5–10.1)
CHLORIDE BLD-SCNC: 106 MMOL/L (ref 94–109)
CO2 SERPL-SCNC: 21 MMOL/L (ref 20–32)
CREAT SERPL-MCNC: 0.53 MG/DL (ref 0.52–1.04)
CRP SERPL-MCNC: <2.9 MG/L (ref 0–8)
EOSINOPHIL # BLD AUTO: 0.4 10E3/UL (ref 0–0.7)
EOSINOPHIL NFR BLD AUTO: 4 %
ERYTHROCYTE [DISTWIDTH] IN BLOOD BY AUTOMATED COUNT: 14.6 % (ref 10–15)
ERYTHROCYTE [SEDIMENTATION RATE] IN BLOOD BY WESTERGREN METHOD: 12 MM/HR (ref 0–20)
GFR SERPL CREATININE-BSD FRML MDRD: >90 ML/MIN/1.73M2
GLUCOSE BLD-MCNC: 84 MG/DL (ref 70–99)
HCT VFR BLD AUTO: 38 % (ref 35–47)
HGB BLD-MCNC: 12.9 G/DL (ref 11.7–15.7)
IMM GRANULOCYTES # BLD: 0 10E3/UL
IMM GRANULOCYTES NFR BLD: 0 %
LYMPHOCYTES # BLD AUTO: 2 10E3/UL (ref 0.8–5.3)
LYMPHOCYTES NFR BLD AUTO: 21 %
MCH RBC QN AUTO: 31.5 PG (ref 26.5–33)
MCHC RBC AUTO-ENTMCNC: 33.9 G/DL (ref 31.5–36.5)
MCV RBC AUTO: 93 FL (ref 78–100)
MONOCYTES # BLD AUTO: 0.8 10E3/UL (ref 0–1.3)
MONOCYTES NFR BLD AUTO: 8 %
NEUTROPHILS # BLD AUTO: 6.3 10E3/UL (ref 1.6–8.3)
NEUTROPHILS NFR BLD AUTO: 66 %
NRBC # BLD AUTO: 0 10E3/UL
NRBC BLD AUTO-RTO: 0 /100
PLATELET # BLD AUTO: 287 10E3/UL (ref 150–450)
POTASSIUM BLD-SCNC: 3.7 MMOL/L (ref 3.4–5.3)
PROT SERPL-MCNC: 6.8 G/DL (ref 6.8–8.8)
RBC # BLD AUTO: 4.09 10E6/UL (ref 3.8–5.2)
SODIUM SERPL-SCNC: 135 MMOL/L (ref 133–144)
WBC # BLD AUTO: 9.6 10E3/UL (ref 4–11)

## 2022-06-22 PROCEDURE — 85025 COMPLETE CBC W/AUTO DIFF WBC: CPT | Performed by: INTERNAL MEDICINE

## 2022-06-22 PROCEDURE — 85652 RBC SED RATE AUTOMATED: CPT | Performed by: INTERNAL MEDICINE

## 2022-06-22 PROCEDURE — 82248 BILIRUBIN DIRECT: CPT | Performed by: INTERNAL MEDICINE

## 2022-06-22 PROCEDURE — 86140 C-REACTIVE PROTEIN: CPT | Performed by: INTERNAL MEDICINE

## 2022-06-22 PROCEDURE — 80053 COMPREHEN METABOLIC PANEL: CPT | Performed by: INTERNAL MEDICINE

## 2022-06-22 NOTE — PROGRESS NOTES
Skilled Nurse visit in the Patient Home to administer Remicade (infliximab) 400 mg. No recent elevated temperature, fever, chills, productive cough, coughing for 3 weeks or longer or hemoptysis, abnormal vital signs, night sweats, chest pain. No  decrease in your appetite, unexplained weight loss or fatigue.  No other new onset medical symptoms. Current weight 160lb. Peripheral IV left AC, 1 attempt. Labs drawn CRPI, ESR, BMP, hepatic function, CBCD. Infusion completed without complication or reaction. Pt reports therapy is effective in managing symptoms related to therapy.

## 2022-06-23 NOTE — PROGRESS NOTES
This is a recent snapshot of the patient's [facility] medical record.  For current drug dose and complete information and questions, call [phone] or In Basket pool, [facility] home infusion ([id number])  CSN Number:  [id number]

## 2022-06-29 ENCOUNTER — VIRTUAL VISIT (OUTPATIENT)
Dept: GASTROENTEROLOGY | Facility: CLINIC | Age: 27
End: 2022-06-29
Payer: COMMERCIAL

## 2022-06-29 VITALS — BODY MASS INDEX: 25.06 KG/M2 | WEIGHT: 160 LBS

## 2022-06-29 DIAGNOSIS — K51.00 ULCERATIVE PANCOLITIS WITHOUT COMPLICATION (H): Primary | ICD-10-CM

## 2022-06-29 PROCEDURE — 99215 OFFICE O/P EST HI 40 MIN: CPT | Mod: 95 | Performed by: INTERNAL MEDICINE

## 2022-06-29 RX ORDER — BUDESONIDE 9 MG/1
9 TABLET, FILM COATED, EXTENDED RELEASE ORAL EVERY MORNING
Qty: 30 TABLET | Refills: 0 | Status: SHIPPED | OUTPATIENT
Start: 2022-06-29 | End: 2024-01-22

## 2022-06-29 ASSESSMENT — PAIN SCALES - GENERAL: PAINLEVEL: NO PAIN (0)

## 2022-06-29 NOTE — PATIENT INSTRUCTIONS
PLAN:  --Continue Remicade at current schedule  ------Labs with every infusion  --Check fecal calprotectin at the start of T2 and then T3 and postpartum  --Referral to MFM to discuss fetal monitoring and delivery method   ------Recommend the following:  Comprehensive US 18-20 weeks  Growth US q 4 weeks at 24  weeks  Weekly BPP at 32 weeks  Recommend delivery at 39 weeks    --We discussed mode of delivery today. UC confers a 20% lifetime risk of colectomy and a grade IV tear may render the external rectal sphincter dysfunction, hence precluding successful pouch surgery due to incontinence. Xochitl is aware that this is a very unlikely outcome, however. In my opinion, vaginal delivery is safe but an elective C section is reasonable as well. She will discuss mode of delivery with M and her Ob as well, if Xochitl prefers this.     --Skip rotavirus vaccine in baby    --PO and SC Uceris to take to [place], just in case flare occurs

## 2022-06-29 NOTE — LETTER
6/29/2022         RE: Xochitl Artis  [address]  [facility] [address]        Dear Colleague,    Thank you for referring your patient, Xochitl Artis, to the [facility] [place]. Please see a copy of my visit note below.    [facility] UC follow up       PATIENT: Xochitl Caputo    MRN: [id number]    Date of Birth 1995    Tel: [phone] (home) NONE (work)    PCP: Jillian Tejada MD     HPI: Ms. Caputo is a 25 year old female here to establish care for UC.     UC history  Was sick before she was diagnosed in 2017. She was seen at [facility] prior to this and was told she had IBS and dehydration.  Hgb was 5.9. Switched care to [facility]. Tried Lialda, imuran, Humira. Transitioned to Entyvio     and has been in symptomatic remission from 11/2018-6/2020. Around June, developed a flare in the setting of psychological stress (pandemic, riots, etc). Lost 22 lb, was on 2 courses prednisone (9/17-present). Currently on 20 mg daily.  Had 3 Entyvio infusions on q4w dosing at this point. Had a flex sig 9/16/2020 (below).     Screening appointment for FMT study here at the .   8/2018 established care with [facility] (integrative medicine) which helped. Switched to [facility] about a year ago.     Meds: Entyvio q4w, low dose naltrexone, levothyroxine, spirinolactone (acne)  Supplements: inflamax (powder), fish oil, reversatrol extract, tumeric with black pepper (740 mg), S. Boularmargot, VSL #3     Gets acupuncture and recently started LED light therapy (a belt of LED lights)    Macroscopic extent of disease (most recent) E3    Current UC symptoms    Bowel frequency in day 3-4   Bowel frequency in night none  Urgency of defecation YES occasionally, mild   Blood in stool only with wiping   General well being 2 = poor  Extracolonic features (multiple select) none     Constitutional symptoms:  Fever NO  Weight loss YES 22 lb since June     Noteworthy  "diet history- GFD, very low dairy, avoids eggs, avoid processed foods/sugars. Avoids raw veggies when flaring.     Total number of IBD surgeries (except perianal): 0    Current IBD Medications:  Entyvio q4w  Prednisone 20 mg daily     Past IBD Medications:   Imuran - got PNA, \"didn't feel like myself\"   Humira -- primary nonresponder   Sarah    Interval history, 11/2020 (video visit)  Has been on prednisone 25 mg daily but can't taper off this further (loose, frequent stools and more blood).   Reports no response to Humira in the past; was on this for 2 months or so. Levels were not checked and did not escalate to EW dosing.     Interval history, 12/2020 (video visit)  Currently on 20 mg prednisone daily. Having some blood, but less than before.   Continues on budesonide foam, which is helpful (but had a setback in the s/o of menstruation).   Had Stelara infusion on 11/24/2020.   Met with Dr. Jeffries on 11/23 with goals to lower fat and increase soluble fiber, follow IBD AID diet.     Current UC symptoms  Bowel frequency in day loose stools in the morning (2) and in the evening (1) (not in the afternoon)  Bowel frequency in night gas +/- stool  Urgency of defecation YES occasionally, mild   Blood in stool 50%; alternatives between the toilet and only with wiping   General well being 2 = poor  Extracolonic features (multiple select) none     Interval history, 1/2021 (video visit)  Off prednisone x 3 days. Continues on budesonide foam x .   Has been experiencing constipation and a mildly painful hemorrhoid.  Last Stelara injection was 1/20/2021    Current UC symptoms  Bowel frequency in day 3 (except 5/day with menstruation)  Bowel frequency in night none  Urgency of defecation No  Blood in stool none    General well being 0 = very well  Extracolonic features (multiple select) none      Interval history, 6/2022 (video visit)  Currently 10 w pregnant and feeling well. No morning sickness. Some fatigue, improved after " week #8. FC on 6/6 was 173. Had some blood in stool around 6/17 which resolved with budesonide foam.   Feeling well from a GI standpoint.     Remicade going well. No breakthrough or waning symptoms. In 10/2021, trough level was 14.     Switching ob specialists to [facility] in [place].   Plans to travel to [facility] at around 22 weeks gestation (Sep 2-12)    Current UC symptoms  Bowel frequency in day 2-3   Bowel frequency in night none  Urgency of defecation No  Blood in stool none    General well being 0 = very well  Extracolonic features (multiple select) none     Past Medical History:   Diagnosis Date     Acne      Hypothyroidism      Ulcerative colitis (H)      Ulcerative colitis (H) 10/28/2020        Past Surgical History:   Procedure Laterality Date     COLONOSCOPY N/A 5/11/2021    Procedure: COLONOSCOPY, WITH POLYPECTOMY AND BIOPSY;  Surgeon: Poonam Dunbar MD;  Location: [facility] OR     EYE SURGERY       ORTHOPEDIC SURGERY      right ankle.       Social History     Tobacco Use     Smoking status: Never Smoker     Smokeless tobacco: Never Used   Substance Use Topics     Alcohol use: Not on file       Family History   Problem Relation Age of Onset     Colitis Maternal Grandfather      Ulcerative Colitis Maternal Grandfather      Crohn's Disease No family hx of      GERD No family hx of      Stomach Cancer No family hx of        No Known Allergies     Outpatient Encounter Medications as of 6/29/2022   Medication Sig Dispense Refill     budesonide (UCERIS) 9 MG 24 hr tablet Take 1 tablet (9 mg) by mouth every morning 30 tablet 0     calcium carbonate (OS-KATIA) 1500 (600 Ca) MG tablet Take 600 mg by mouth daily        dhea 25 MG TABS Take 25 mg by mouth daily       levothyroxine (SYNTHROID/LEVOTHROID) 50 MCG tablet Take 50 mcg by mouth       Vitamin D3 (CHOLECALCIFEROL) 125 MCG (5000 UT) tablet Take 1 tablet by mouth every other day       Budesonide 2 MG/ACT FOAM Place 2 mg rectally 2 times daily for 14 days, THEN 2 mg  daily for 28 days. Please dispense the correct number to fulfill the directoins (Patient not taking: Reported on 6/29/2022) 33.4 g 0     cannabidiol (EPIDIOLEX) 100 MG/ML oral solution Take 30 mg by mouth (Patient not taking: Reported on 6/29/2022)       multivitamin w/minerals (THERA-VIT-M) tablet Take 1 tablet by mouth 2 times daily  (Patient not taking: Reported on 6/29/2022)       sertraline (ZOLOFT) 50 MG tablet Take 50 mg by mouth daily  (Patient not taking: Reported on 6/29/2022)       spironolactone (ALDACTONE) 50 MG tablet Take 50 mg by mouth (Patient not taking: Reported on 6/29/2022)       No facility-administered encounter medications on file as of 6/29/2022.      NSAID  none    Review of Systems  Complete 10 System ROS performed. All are negative except as documented below, in the HPI, or in patient questionnaire from today's visit.    1) Constitutional: No fevers, chills, night sweats or malaise, weight loss or gain  2) Skin: No rash  3) Pulmonary: No wheeze, SOB, cough, sputum or hemoptysis  4) Cardiovascular: No Chest pain or palpitations  5) Genitourinary: No blood in urine or dysuria  6) Endocrine: No increased sweating, hunger, thirst or thyroid problems  7) Hematologic: No bruising and easy bleeding  8) Musculoskeletal: no new pain in joints or limitation in ROM  9) Neurologic: No dizziness, paresthesias or weakness or falls  10) Psychiatric:  not depressed/anxious, no sleep problems    PHYSICAL EXAM  Vitals: Wt 72.6 kg (160 lb)   BMI 25.06 kg/m      No Pain (0)       General appearance  Healthy appearing adult, in no acute distress     Eyes  Sclera anicteric  Pupils round and reactive to light     Ears, nose, mouth and throat  No obvious external lesions of ears and nose  Hearing intact     Neck  Symmetric  No obvious external lesions     Respiratory  Normal respiration, no use of accessory muscles      MSK  Gait normal     Skin  No rashes or jaundice      Psychiatric  Oriented to person, place  and time  Appropriate mood and affect.     DATA:  Reviewed in detail past documentation, medications and prior workup available in electronic health records or through outside records.    PERTINENT STUDIES:  Fecal calprotectin 6/2022 173     Most recent CBC:  WBC   Date Value Ref Range Status   11/24/2020 14.6 (H) 4.0 - 11.0 10e9/L Final     WBC Count   Date Value Ref Range Status   06/22/2022 9.6 4.0 - 11.0 10e3/uL Final   ]  Hemoglobin   Date Value Ref Range Status   06/22/2022 12.9 11.7 - 15.7 g/dL Final   11/24/2020 11.2 (L) 11.7 - 15.7 g/dL Final   ]   Platelet Count   Date Value Ref Range Status   06/22/2022 287 150 - 450 10e3/uL Final   11/24/2020 408 150 - 450 10e9/L Final     Most recent hepatic panel:  AST   Date Value Ref Range Status   06/22/2022 16 0 - 45 U/L Final   11/24/2020 6 0 - 45 U/L Final     ALT   Date Value Ref Range Status   06/22/2022 20 0 - 50 U/L Final   11/24/2020 14 0 - 50 U/L Final     No results found for: BILICONJ   Bilirubin Total   Date Value Ref Range Status   06/22/2022 0.4 0.2 - 1.3 mg/dL Final   11/24/2020 0.2 0.2 - 1.3 mg/dL Final     Albumin   Date Value Ref Range Status   06/22/2022 3.2 (L) 3.4 - 5.0 g/dL Final   11/24/2020 2.9 (L) 3.4 - 5.0 g/dL Final     Alkaline Phosphatase   Date Value Ref Range Status   06/22/2022 47 40 - 150 U/L Final   11/24/2020 51 40 - 150 U/L Final       Most recent creatinine:  Creatinine   Date Value Ref Range Status   06/22/2022 0.53 0.52 - 1.04 mg/dL Final   05/11/2021 0.68 0.52 - 1.04 mg/dL Final       Endoscopy:     Flex sig 9/2020 showed Tolliver 3 colitis from rectum to descending colon    icscope 9/2019:  Impression:   - The examined portion of the ileum                        was normal.                       - One 10 to 11 mm polyp in the                        sigmoid colon, removed with a hot                        snare. Resected and retrieved.                       - Pancolitis. Inflammation was found                        from the anus  to the cecum. This was                        mild in severity and graded as Tolliver                        Score 1 (mild disease). Biopsied.      icscope 8/2017 showed E3 colitis (severity unclear): Congested, erythematous and eroded                        mucosa in the entire examined colon.                        Biopsied from a) right colon and b)                        left colon. Findings consistent with                        ulcerative pan colitis.    Specimens:   A) - Colon, , Right colon bx's                                                                      B) - Colon, , Left colon bx's                                                                       C) - Colon, sigmoid                                                                        FINAL DIAGNOSIS A.  Colon, right, biopsy -- Colonic mucosal fragments with minimal crypt architectural distortion and minimal, focal activity    B.  Colon, left, biopsy -- Colonic mucosal fragments with minimal crypt architectural distortion and minimal, focal activity    C.  Colon, sigmoid, polypectomy -- Inflammatory polyp       IMPRESSION:    Ms. Caputo is a 26 year old here with pan UC on Remicade monotherapy doing well. She is currently 10 weeks pregnant. Last FC was 176, which is slightly elevated. She did have some blood in the stool, which has completely resolved with budesonide foam.     We will proceed with the following:    DIAGNOSIS:  # Pregnant, T1  # Pan UC, on Remicade 5 mg/kg q8 w      PLAN:  --Continue Remicade at current schedule  ------Labs with every infusion  --Check fecal calprotectin at the start of T2 and then T3 and postpartum  --Referral to [facility] to discuss fetal monitoring and delivery method   ------Recommend the following:  Comprehensive US 18-20 weeks  Growth US q 4 weeks at 24  weeks  Weekly BPP at 32 weeks  Recommend delivery at 39 weeks    --We discussed mode of delivery today. UC confers a 20% lifetime risk of colectomy and a grade  IV tear may render the external rectal sphincter dysfunction, hence precluding successful pouch surgery due to incontinence. Xochitl is aware that this is a very unlikely outcome, however. In my opinion, vaginal delivery is safe but an elective C section is reasonable as well. She will discuss mode of delivery with MFM and her Ob as well, if Xochitl prefers this.     --Skip rotavirus vaccine in baby    --PO and ID Uceris to take to [place], just in case flare occurs    Misc:  -- Avoid tobacco use  -- Avoid NSAIDs as there is potentially a 25% chance of causing an IBD flare    Return in about 3 months (around 9/29/2022).    40 minutes spent on the date of the encounter performing chart review, history and exam, documentation and further activities as noted above.       Poonam Dunbar MD   of Medicine  Division of Gastroenterology, Hepatology and Nutrition  [facility]

## 2022-06-29 NOTE — NURSING NOTE
Patient states she is taking prenatal vitamins daily instead of a multivitamin daily.      [place]

## 2022-06-29 NOTE — PROGRESS NOTES
"Xochitl is a 26 year old who is being evaluated via a billable video visit.      How would you like to obtain your AVS? MyChart  If the video visit is dropped, the invitation should be resent by: Send to e-mail at: [email or web address]  Will anyone else be joining your video visit? No       [place]      Video-Visit Details    Video Start Time: 12:11 PM    Type of service:  Video Visit    Video End Time:12:45 PM    Originating Location (pt. Location): Home    Distant Location (provider location):  [facility] [place]     Platform used for Video Visit: Nikunj     Xochitl Caputo is a 25 year old female who is being evaluated via a billable video visit.      The patient has been notified of following:     \"This video visit will be conducted via a call between you and your physician/provider. We have found that certain health care needs can be provided without the need for an in-person physical exam.  This service lets us provide the care you need with a video conversation.  If a prescription is necessary we can send it directly to your pharmacy.  If lab work is needed we can place an order for that and you can then stop by our lab to have the test done at a later time.    If during the course of the call the physician/provider feels a video visit is not appropriate, you will not be charged for this service.\"     Patient confirmed that they are in [place] for today's visit yes    If the video visit is dropped, please send link to:   Text to cell phone: [phone]       [facility] UC follow up       PATIENT: Xochitl Caputo    MRN: [id number]    Date of Birth 1995    Tel: [phone] (home) NONE (work)    PCP: Jillian Tejada MD     HPI: Ms. Caputo is a 25 year old female here to establish care for UC.     UC history  Was sick before she was diagnosed in 2017. She was seen at [facility] prior to this and was told she had IBS and dehydration.  Hgb was " "5.9. Switched care to [facility]. Tried Lialda, imuran, Humira. Transitioned to Entyvio     and has been in symptomatic remission from 11/2018-6/2020. Around June, developed a flare in the setting of psychological stress (pandemic, riots, etc). Lost 22 lb, was on 2 courses prednisone (9/17-present). Currently on 20 mg daily.  Had 3 Entyvio infusions on q4w dosing at this point. Had a flex sig 9/16/2020 (below).     Screening appointment for FMT study here at the .   8/2018 established care with [facility] medicine (integrative medicine) which helped. Switched to [facility] about a year ago.     Meds: Entyvio q4w, low dose naltrexone, levothyroxine, spirinolactone (acne)  Supplements: inflamax (powder), fish oil, reversatrol extract, tumeric with black pepper (740 mg), S. Boolivia, VSL #3     Gets acupuncture and recently started LED light therapy (a belt of LED lights)    Macroscopic extent of disease (most recent) E3    Current UC symptoms    Bowel frequency in day 3-4   Bowel frequency in night none  Urgency of defecation YES occasionally, mild   Blood in stool only with wiping   General well being 2 = poor  Extracolonic features (multiple select) none     Constitutional symptoms:  Fever NO  Weight loss YES 22 lb since June     Noteworthy diet history- GFD, very low dairy, avoids eggs, avoid processed foods/sugars. Avoids raw veggies when flaring.     Total number of IBD surgeries (except perianal): 0    Current IBD Medications:  Entyvio q4w  Prednisone 20 mg daily     Past IBD Medications:   Imuran - got PNA, \"didn't feel like myself\"   Humira -- primary nonresponder   Lialda    Interval history, 11/2020 (video visit)  Has been on prednisone 25 mg daily but can't taper off this further (loose, frequent stools and more blood).   Reports no response to Humira in the past; was on this for 2 months or so. Levels were not checked and did not escalate to EW dosing.     Interval history, 12/2020 " (video visit)  Currently on 20 mg prednisone daily. Having some blood, but less than before.   Continues on budesonide foam, which is helpful (but had a setback in the s/o of menstruation).   Had Stelara infusion on 11/24/2020.   Met with Dr. Jeffries on 11/23 with goals to lower fat and increase soluble fiber, follow IBD AID diet.     Current UC symptoms  Bowel frequency in day loose stools in the morning (2) and in the evening (1) (not in the afternoon)  Bowel frequency in night gas +/- stool  Urgency of defecation YES occasionally, mild   Blood in stool 50%; alternatives between the toilet and only with wiping   General well being 2 = poor  Extracolonic features (multiple select) none     Interval history, 1/2021 (video visit)  Off prednisone x 3 days. Continues on budesonide foam x .   Has been experiencing constipation and a mildly painful hemorrhoid.  Last Stelara injection was 1/20/2021    Current UC symptoms  Bowel frequency in day 3 (except 5/day with menstruation)  Bowel frequency in night none  Urgency of defecation No  Blood in stool none    General well being 0 = very well  Extracolonic features (multiple select) none      Interval history, 6/2022 (video visit)  Currently 10 w pregnant and feeling well. No morning sickness. Some fatigue, improved after week #8. FC on 6/6 was 173. Had some blood in stool around 6/17 which resolved with budesonide foam.   Feeling well from a GI standpoint.     Remicade going well. No breakthrough or waning symptoms. In 10/2021, trough level was 14.     Switching ob specialists to [facility] in [place].   Plans to travel to [facility] at around 22 weeks gestation (Sep 2-12)    Current UC symptoms  Bowel frequency in day 2-3   Bowel frequency in night none  Urgency of defecation No  Blood in stool none    General well being 0 = very well  Extracolonic features (multiple select) none     Past Medical History:   Diagnosis Date     Acne      Hypothyroidism      Ulcerative colitis (H)       Ulcerative colitis (H) 10/28/2020        Past Surgical History:   Procedure Laterality Date     COLONOSCOPY N/A 5/11/2021    Procedure: COLONOSCOPY, WITH POLYPECTOMY AND BIOPSY;  Surgeon: Poonam Dunbar MD;  Location: [facility] OR     EYE SURGERY       ORTHOPEDIC SURGERY      right ankle.       Social History     Tobacco Use     Smoking status: Never Smoker     Smokeless tobacco: Never Used   Substance Use Topics     Alcohol use: Not on file       Family History   Problem Relation Age of Onset     Colitis Maternal Grandfather      Ulcerative Colitis Maternal Grandfather      Crohn's Disease No family hx of      GERD No family hx of      Stomach Cancer No family hx of        No Known Allergies     Outpatient Encounter Medications as of 6/29/2022   Medication Sig Dispense Refill     budesonide (UCERIS) 9 MG 24 hr tablet Take 1 tablet (9 mg) by mouth every morning 30 tablet 0     calcium carbonate (OS-KATIA) 1500 (600 Ca) MG tablet Take 600 mg by mouth daily        dhea 25 MG TABS Take 25 mg by mouth daily       levothyroxine (SYNTHROID/LEVOTHROID) 50 MCG tablet Take 50 mcg by mouth       Vitamin D3 (CHOLECALCIFEROL) 125 MCG (5000 UT) tablet Take 1 tablet by mouth every other day       Budesonide 2 MG/ACT FOAM Place 2 mg rectally 2 times daily for 14 days, THEN 2 mg daily for 28 days. Please dispense the correct number to fulfill the directoins (Patient not taking: Reported on 6/29/2022) 33.4 g 0     cannabidiol (EPIDIOLEX) 100 MG/ML oral solution Take 30 mg by mouth (Patient not taking: Reported on 6/29/2022)       multivitamin w/minerals (THERA-VIT-M) tablet Take 1 tablet by mouth 2 times daily  (Patient not taking: Reported on 6/29/2022)       sertraline (ZOLOFT) 50 MG tablet Take 50 mg by mouth daily  (Patient not taking: Reported on 6/29/2022)       spironolactone (ALDACTONE) 50 MG tablet Take 50 mg by mouth (Patient not taking: Reported on 6/29/2022)       No facility-administered encounter medications on file as  of 6/29/2022.      NSAID  none    Review of Systems  Complete 10 System ROS performed. All are negative except as documented below, in the HPI, or in patient questionnaire from today's visit.    1) Constitutional: No fevers, chills, night sweats or malaise, weight loss or gain  2) Skin: No rash  3) Pulmonary: No wheeze, SOB, cough, sputum or hemoptysis  4) Cardiovascular: No Chest pain or palpitations  5) Genitourinary: No blood in urine or dysuria  6) Endocrine: No increased sweating, hunger, thirst or thyroid problems  7) Hematologic: No bruising and easy bleeding  8) Musculoskeletal: no new pain in joints or limitation in ROM  9) Neurologic: No dizziness, paresthesias or weakness or falls  10) Psychiatric:  not depressed/anxious, no sleep problems    PHYSICAL EXAM  Vitals: Wt 72.6 kg (160 lb)   BMI 25.06 kg/m      No Pain (0)       General appearance  Healthy appearing adult, in no acute distress     Eyes  Sclera anicteric  Pupils round and reactive to light     Ears, nose, mouth and throat  No obvious external lesions of ears and nose  Hearing intact     Neck  Symmetric  No obvious external lesions     Respiratory  Normal respiration, no use of accessory muscles      MSK  Gait normal     Skin  No rashes or jaundice      Psychiatric  Oriented to person, place and time  Appropriate mood and affect.     DATA:  Reviewed in detail past documentation, medications and prior workup available in electronic health records or through outside records.    PERTINENT STUDIES:  Fecal calprotectin 6/2022 173     Most recent CBC:  WBC   Date Value Ref Range Status   11/24/2020 14.6 (H) 4.0 - 11.0 10e9/L Final     WBC Count   Date Value Ref Range Status   06/22/2022 9.6 4.0 - 11.0 10e3/uL Final   ]  Hemoglobin   Date Value Ref Range Status   06/22/2022 12.9 11.7 - 15.7 g/dL Final   11/24/2020 11.2 (L) 11.7 - 15.7 g/dL Final   ]   Platelet Count   Date Value Ref Range Status   06/22/2022 287 150 - 450 10e3/uL Final   11/24/2020  408 150 - 450 10e9/L Final     Most recent hepatic panel:  AST   Date Value Ref Range Status   06/22/2022 16 0 - 45 U/L Final   11/24/2020 6 0 - 45 U/L Final     ALT   Date Value Ref Range Status   06/22/2022 20 0 - 50 U/L Final   11/24/2020 14 0 - 50 U/L Final     No results found for: BILICONJ   Bilirubin Total   Date Value Ref Range Status   06/22/2022 0.4 0.2 - 1.3 mg/dL Final   11/24/2020 0.2 0.2 - 1.3 mg/dL Final     Albumin   Date Value Ref Range Status   06/22/2022 3.2 (L) 3.4 - 5.0 g/dL Final   11/24/2020 2.9 (L) 3.4 - 5.0 g/dL Final     Alkaline Phosphatase   Date Value Ref Range Status   06/22/2022 47 40 - 150 U/L Final   11/24/2020 51 40 - 150 U/L Final       Most recent creatinine:  Creatinine   Date Value Ref Range Status   06/22/2022 0.53 0.52 - 1.04 mg/dL Final   05/11/2021 0.68 0.52 - 1.04 mg/dL Final       Endoscopy:     Flex sig 9/2020 showed Tolliver 3 colitis from rectum to descending colon    icscope 9/2019:  Impression:   - The examined portion of the ileum                        was normal.                       - One 10 to 11 mm polyp in the                        sigmoid colon, removed with a hot                        snare. Resected and retrieved.                       - Pancolitis. Inflammation was found                        from the anus to the cecum. This was                        mild in severity and graded as Tolliver                        Score 1 (mild disease). Biopsied.      icscope 8/2017 showed E3 colitis (severity unclear): Congested, erythematous and eroded                        mucosa in the entire examined colon.                        Biopsied from a) right colon and b)                        left colon. Findings consistent with                        ulcerative pan colitis.    Specimens:   A) - Colon, , Right colon bx's                                                                      B) - Colon, , Left colon bx's                                                                        C) - Colon, sigmoid                                                                        FINAL DIAGNOSIS A.  Colon, right, biopsy -- Colonic mucosal fragments with minimal crypt architectural distortion and minimal, focal activity    B.  Colon, left, biopsy -- Colonic mucosal fragments with minimal crypt architectural distortion and minimal, focal activity    C.  Colon, sigmoid, polypectomy -- Inflammatory polyp       IMPRESSION:    Ms. Caputo is a 26 year old here with pan UC on Remicade monotherapy doing well. She is currently 10 weeks pregnant. Last FC was 176, which is slightly elevated. She did have some blood in the stool, which has completely resolved with budesonide foam.     We will proceed with the following:    DIAGNOSIS:  # Pregnant, T1  # Pan UC, on Remicade 5 mg/kg q8 w      PLAN:  --Continue Remicade at current schedule  ------Labs with every infusion  --Check fecal calprotectin at the start of T2 and then T3 and postpartum  --Referral to [facility] to discuss fetal monitoring and delivery method   ------Recommend the following:  Comprehensive US 18-20 weeks  Growth US q 4 weeks at 24  weeks  Weekly BPP at 32 weeks  Recommend delivery at 39 weeks    --We discussed mode of delivery today. UC confers a 20% lifetime risk of colectomy and a grade IV tear may render the external rectal sphincter dysfunction, hence precluding successful pouch surgery due to incontinence. Xochitl is aware that this is a very unlikely outcome, however. In my opinion, vaginal delivery is safe but an elective C section is reasonable as well. She will discuss mode of delivery with MF and her Ob as well, if Xochitl prefers this.     --Skip rotavirus vaccine in baby    --PO and NV Uceris to take to [place], just in case flare occurs    Misc:  -- Avoid tobacco use  -- Avoid NSAIDs as there is potentially a 25% chance of causing an IBD flare    Return in about 3 months (around 9/29/2022).    40 minutes spent on the date of the  encounter performing chart review, history and exam, documentation and further activities as noted above.     Poonam Dunbar MD   of Medicine  Division of Gastroenterology, Hepatology and Nutrition  [facility]

## 2022-07-06 ENCOUNTER — MEDICAL CORRESPONDENCE (OUTPATIENT)
Dept: HEALTH INFORMATION MANAGEMENT | Facility: CLINIC | Age: 27
End: 2022-07-06

## 2022-07-07 ENCOUNTER — TELEPHONE (OUTPATIENT)
Dept: GASTROENTEROLOGY | Facility: CLINIC | Age: 27
End: 2022-07-07

## 2022-07-07 NOTE — PROGRESS NOTES
Order signed and faxed back to [facility] at [phone]. Copy scanned into patient chart.

## 2022-07-12 ENCOUNTER — TRANSFERRED RECORDS (OUTPATIENT)
Dept: HEALTH INFORMATION MANAGEMENT | Facility: CLINIC | Age: 27
End: 2022-07-12

## 2022-08-03 ENCOUNTER — HOME INFUSION (PRE-WILLOW HOME INFUSION) (OUTPATIENT)
Dept: PHARMACY | Facility: CLINIC | Age: 27
End: 2022-08-03

## 2022-08-04 ENCOUNTER — PATIENT OUTREACH (OUTPATIENT)
Dept: GASTROENTEROLOGY | Facility: CLINIC | Age: 27
End: 2022-08-04

## 2022-08-04 DIAGNOSIS — K51.911 ULCERATIVE COLITIS WITH RECTAL BLEEDING, UNSPECIFIED LOCATION (H): Primary | ICD-10-CM

## 2022-08-04 RX ORDER — METHYLPREDNISOLONE SODIUM SUCCINATE 125 MG/2ML
32 INJECTION, POWDER, LYOPHILIZED, FOR SOLUTION INTRAMUSCULAR; INTRAVENOUS ONCE
Status: CANCELLED | OUTPATIENT
Start: 2022-08-04 | End: 2022-08-04

## 2022-08-04 RX ORDER — ALBUTEROL SULFATE 0.83 MG/ML
2.5 SOLUTION RESPIRATORY (INHALATION)
Status: CANCELLED | OUTPATIENT
Start: 2022-08-04

## 2022-08-04 RX ORDER — DIPHENHYDRAMINE HYDROCHLORIDE 50 MG/ML
50 INJECTION INTRAMUSCULAR; INTRAVENOUS
Status: CANCELLED
Start: 2022-08-04

## 2022-08-04 RX ORDER — EPINEPHRINE 1 MG/ML
0.3 INJECTION, SOLUTION, CONCENTRATE INTRAVENOUS EVERY 5 MIN PRN
Status: CANCELLED | OUTPATIENT
Start: 2022-08-04

## 2022-08-04 RX ORDER — DIPHENHYDRAMINE HCL 25 MG
25 CAPSULE ORAL ONCE
Status: CANCELLED
Start: 2022-08-04 | End: 2022-08-04

## 2022-08-04 RX ORDER — ACETAMINOPHEN 325 MG/1
650 TABLET ORAL ONCE
Status: CANCELLED
Start: 2022-08-04 | End: 2022-08-04

## 2022-08-04 RX ORDER — MEPERIDINE HYDROCHLORIDE 25 MG/ML
25 INJECTION INTRAMUSCULAR; INTRAVENOUS; SUBCUTANEOUS EVERY 30 MIN PRN
Status: CANCELLED | OUTPATIENT
Start: 2022-08-04

## 2022-08-04 RX ORDER — ALBUTEROL SULFATE 90 UG/1
1-2 AEROSOL, METERED RESPIRATORY (INHALATION)
Status: CANCELLED
Start: 2022-08-04

## 2022-08-04 RX ORDER — METHYLPREDNISOLONE SODIUM SUCCINATE 125 MG/2ML
125 INJECTION, POWDER, LYOPHILIZED, FOR SOLUTION INTRAMUSCULAR; INTRAVENOUS
Status: CANCELLED
Start: 2022-08-04

## 2022-08-04 NOTE — PROGRESS NOTES
This is a recent snapshot of the patient's [facility] medical record.  For current drug dose and complete information and questions, call [phone] or In Basket pool, fv home infusion ([id number])  CSN Number:  [id number]

## 2022-08-04 NOTE — PROGRESS NOTES
Remicade therapy plan orders ; plan has been updated. Patient remains on the same medication regimen. Standing lab orders placed, including BMP. Plan to collect labs with every infusion while pregnant.

## 2022-08-05 DIAGNOSIS — K51.00 ULCERATIVE PANCOLITIS WITHOUT COMPLICATION (H): ICD-10-CM

## 2022-08-08 ENCOUNTER — TRANSFERRED RECORDS (OUTPATIENT)
Dept: HEALTH INFORMATION MANAGEMENT | Facility: CLINIC | Age: 27
End: 2022-08-08

## 2022-08-10 ENCOUNTER — HOME INFUSION (PRE-WILLOW HOME INFUSION) (OUTPATIENT)
Dept: PHARMACY | Facility: CLINIC | Age: 27
End: 2022-08-10

## 2022-08-10 NOTE — TELEPHONE ENCOUNTER
budesonide (UCERIS) 9 MG 24 hr tablet      Last Written Prescription Date:  6/29/2022  Last Fill Quantity: 30,   # refills: 0  Last Office Visit : 6/29/2022  Future Office visit:  none    Routing refill request to provider for review/approval because:  Medication not on the GI refill protocol.  - patient is pregnant

## 2022-08-11 ENCOUNTER — MEDICAL CORRESPONDENCE (OUTPATIENT)
Dept: HEALTH INFORMATION MANAGEMENT | Facility: CLINIC | Age: 27
End: 2022-08-11

## 2022-08-11 ENCOUNTER — TRANSCRIBE ORDERS (OUTPATIENT)
Dept: MATERNAL FETAL MEDICINE | Facility: CLINIC | Age: 27
End: 2022-08-11

## 2022-08-11 DIAGNOSIS — O26.90 PREGNANCY RELATED CONDITION, ANTEPARTUM: Primary | ICD-10-CM

## 2022-08-11 DIAGNOSIS — K51.90 ULCERATIVE COLITIS (H): ICD-10-CM

## 2022-08-12 RX ORDER — BUDESONIDE 9 MG/1
9 TABLET, FILM COATED, EXTENDED RELEASE ORAL EVERY MORNING
Qty: 90 TABLET | Refills: 3 | OUTPATIENT
Start: 2022-08-12

## 2022-08-15 ENCOUNTER — HOME INFUSION (PRE-WILLOW HOME INFUSION) (OUTPATIENT)
Dept: PHARMACY | Facility: CLINIC | Age: 27
End: 2022-08-15

## 2022-08-15 NOTE — PROGRESS NOTES
This is a recent snapshot of the patient's [facility] medical record.  For current drug dose and complete information and questions, call [phone] or In Basket pool, fv home infusion ([id number])  CSN Number:  [id number]

## 2022-08-16 NOTE — PROGRESS NOTES
This is a recent snapshot of the patient's [facility] medical record.  For current drug dose and complete information and questions, call [phone] or In Basket pool, fv home infusion ([id number])  CSN Number:  [id number]

## 2022-08-17 ENCOUNTER — DOCUMENTATION ONLY (OUTPATIENT)
Dept: PHARMACY | Facility: CLINIC | Age: 27
End: 2022-08-17

## 2022-08-17 ENCOUNTER — HOME INFUSION (PRE-WILLOW HOME INFUSION) (OUTPATIENT)
Dept: PHARMACY | Facility: CLINIC | Age: 27
End: 2022-08-17

## 2022-08-17 ENCOUNTER — LAB REQUISITION (OUTPATIENT)
Dept: LAB | Facility: CLINIC | Age: 27
End: 2022-08-17
Payer: COMMERCIAL

## 2022-08-17 DIAGNOSIS — K51.90 ULCERATIVE COLITIS, UNSPECIFIED, WITHOUT COMPLICATIONS (H): ICD-10-CM

## 2022-08-17 LAB
ALBUMIN SERPL-MCNC: 3 G/DL (ref 3.4–5)
ALP SERPL-CCNC: 55 U/L (ref 40–150)
ALT SERPL W P-5'-P-CCNC: 18 U/L (ref 0–50)
ANION GAP SERPL CALCULATED.3IONS-SCNC: 7 MMOL/L (ref 3–14)
AST SERPL W P-5'-P-CCNC: 12 U/L (ref 0–45)
BASOPHILS # BLD AUTO: 0 10E3/UL (ref 0–0.2)
BASOPHILS NFR BLD AUTO: 0 %
BILIRUB DIRECT SERPL-MCNC: <0.1 MG/DL (ref 0–0.2)
BILIRUB SERPL-MCNC: 0.6 MG/DL (ref 0.2–1.3)
BUN SERPL-MCNC: 5 MG/DL (ref 7–30)
CALCIUM SERPL-MCNC: 8.4 MG/DL (ref 8.5–10.1)
CHLORIDE BLD-SCNC: 106 MMOL/L (ref 94–109)
CO2 SERPL-SCNC: 23 MMOL/L (ref 20–32)
CREAT SERPL-MCNC: 0.54 MG/DL (ref 0.52–1.04)
CRP SERPL-MCNC: 5.8 MG/L (ref 0–8)
EOSINOPHIL # BLD AUTO: 0.7 10E3/UL (ref 0–0.7)
EOSINOPHIL NFR BLD AUTO: 7 %
ERYTHROCYTE [DISTWIDTH] IN BLOOD BY AUTOMATED COUNT: 13.9 % (ref 10–15)
ERYTHROCYTE [SEDIMENTATION RATE] IN BLOOD BY WESTERGREN METHOD: 25 MM/HR (ref 0–20)
GFR SERPL CREATININE-BSD FRML MDRD: >90 ML/MIN/1.73M2
GLUCOSE BLD-MCNC: 96 MG/DL (ref 70–99)
HCT VFR BLD AUTO: 37.9 % (ref 35–47)
HGB BLD-MCNC: 12.8 G/DL (ref 11.7–15.7)
IMM GRANULOCYTES # BLD: 0 10E3/UL
IMM GRANULOCYTES NFR BLD: 0 %
LYMPHOCYTES # BLD AUTO: 2 10E3/UL (ref 0.8–5.3)
LYMPHOCYTES NFR BLD AUTO: 22 %
MCH RBC QN AUTO: 31.7 PG (ref 26.5–33)
MCHC RBC AUTO-ENTMCNC: 33.8 G/DL (ref 31.5–36.5)
MCV RBC AUTO: 94 FL (ref 78–100)
MONOCYTES # BLD AUTO: 0.5 10E3/UL (ref 0–1.3)
MONOCYTES NFR BLD AUTO: 6 %
NEUTROPHILS # BLD AUTO: 5.9 10E3/UL (ref 1.6–8.3)
NEUTROPHILS NFR BLD AUTO: 65 %
NRBC # BLD AUTO: 0 10E3/UL
NRBC BLD AUTO-RTO: 0 /100
PLATELET # BLD AUTO: 287 10E3/UL (ref 150–450)
POTASSIUM BLD-SCNC: 3.6 MMOL/L (ref 3.4–5.3)
PROT SERPL-MCNC: 6.5 G/DL (ref 6.8–8.8)
RBC # BLD AUTO: 4.04 10E6/UL (ref 3.8–5.2)
SODIUM SERPL-SCNC: 136 MMOL/L (ref 133–144)
WBC # BLD AUTO: 9.2 10E3/UL (ref 4–11)

## 2022-08-17 PROCEDURE — 82248 BILIRUBIN DIRECT: CPT | Performed by: INTERNAL MEDICINE

## 2022-08-17 PROCEDURE — 80053 COMPREHEN METABOLIC PANEL: CPT | Performed by: INTERNAL MEDICINE

## 2022-08-17 PROCEDURE — 85025 COMPLETE CBC W/AUTO DIFF WBC: CPT | Performed by: INTERNAL MEDICINE

## 2022-08-17 PROCEDURE — 86140 C-REACTIVE PROTEIN: CPT | Performed by: INTERNAL MEDICINE

## 2022-08-17 PROCEDURE — 85652 RBC SED RATE AUTOMATED: CPT | Performed by: INTERNAL MEDICINE

## 2022-08-19 NOTE — PROGRESS NOTES
Skilled Nurse visit in the Patient Home to administer Remicade (infliximab) 400 mg. No recent elevated temperature, fever, chills, productive cough, coughing for 3 weeks or longer or hemoptysis, abnormal vital signs, night sweats, chest pain. No decrease in your appetite, unexplained weight loss or fatigue. No other new onset medical symptoms. Current weight 165lb. Peripheral IV left AC, 1 attempt. Labs drawn CBC with platelets differential, ESR, CRP, Hepatic Panel, and BMP. Infusion completed without complication or reaction. Pt reports therapy is effective in managing symptoms related to therapy.

## 2022-08-22 NOTE — PROGRESS NOTES
This is a recent snapshot of the patient's [facility] medical record.  For current drug dose and complete information and questions, call [phone] or In Basket pool, fv home infusion ([id number])  CSN Number:  [id number]

## 2022-08-24 ENCOUNTER — PRE VISIT (OUTPATIENT)
Dept: MATERNAL FETAL MEDICINE | Facility: CLINIC | Age: 27
End: 2022-08-24

## 2022-08-27 NOTE — PROGRESS NOTES
Maternal-Fetal Medicine Consultation    Xochitl Artis  : 1995  MRN: [id number]    REFERRAL:  Xochitl Artis is a 26 year old sent by Dr. Major for MFM consultation.    HPI:  Xochitl Artis is a 26 year old  at 18w4d by LMP consistent with 11w4d US here for MFM consultation regarding ulcerative colitis and hypothyroidism. She is here with her partner, Jose.    The patient reports she has been doing well. She has no rectal bleeding or pain currently. She has been weaning off of rectal Uceris, now only using once per day with plans to stop it. She has been getting Remicaide every 8 weeks. She sees Dr. Graham with gastroenterology.    She was diagnosed in 2017 after presenting with weight loss and rectal bleeding, as well as a hemoglobin of 5.9. She has been on multiple medications in the past including Lialda, Imuran, Humira, and Entyvio. She has also been on prednisone courses, but no systemic steroids currently.    Prenatal Care:  Primary OB care this pregnancy has been with Dr. Major.    Obstetrics History:  OB History    Para Term  AB Living   1 0 0 0 0 0   SAB IAB Ectopic Multiple Live Births   0 0 0 0 0      # Outcome Date GA Lbr Tesfaye/2nd Weight Sex Delivery Anes PTL Lv   1 Current                Gynecologic History:  - Denies any history of abnormal pap smears  - Denies prior cervical surgery or procedures  - Denies any history of frequent UTIs, vaginal infections, or STIs    Past Medical History:  Past Medical History:   Diagnosis Date     Acne      Hypothyroidism      Ulcerative colitis (H)      Ulcerative colitis (H) 10/28/2020     Past Surgical History:  Past Surgical History:   Procedure Laterality Date     COLONOSCOPY N/A 2021    Procedure: COLONOSCOPY, WITH POLYPECTOMY AND BIOPSY;  Surgeon: Poonam Dunbar MD;  [place] [facility] [place]     [place] SURGERY       ORTHOPEDIC SURGERY      right ankle.     Current Medications:  Prior to Admission medications    Medication Sig Last Dose  "Taking? Auth Provider Long Term End Date   budesonide (UCERIS) 9 MG 24 hr tablet Take 1 tablet (9 mg) by mouth every morning   Poonam Dunbar MD Yes    Budesonide 2 MG/ACT FOAM Place 2 mg rectally 2 times daily for 14 days, THEN 2 mg daily for 28 days.   Poonam Dunbar MD  9/19/22   calcium carbonate (OS-KATIA) 1500 (600 Ca) MG tablet Take 600 mg by mouth daily    Reported, Patient     cannabidiol (EPIDIOLEX) 100 MG/ML oral solution Take 30 mg by mouth  Patient not taking: Reported on 6/29/2022   Reported, Patient     dhea 25 MG TABS Take 25 mg by mouth daily   Reported, Patient     levothyroxine (SYNTHROID/LEVOTHROID) 50 MCG tablet Take 50 mcg by mouth   Reported, Patient Yes    multivitamin w/minerals (THERA-VIT-M) tablet Take 1 tablet by mouth 2 times daily   Patient not taking: Reported on 6/29/2022   Reported, Patient     sertraline (ZOLOFT) 50 MG tablet Take 50 mg by mouth daily   Patient not taking: Reported on 6/29/2022   Reported, Patient Yes    spironolactone (ALDACTONE) 50 MG tablet Take 50 mg by mouth  Patient not taking: Reported on 6/29/2022   Reported, Patient Yes    Vitamin D3 (CHOLECALCIFEROL) 125 MCG (5000 UT) tablet Take 1 tablet by mouth every other day   Reported, Patient       Allergies:  Patient has no known allergies.    Social History:   Denies use of alcohol, drugs or smoking.    Family History:  Denies history of genetic disorders, preeclampsia, thromboembolic disease, bleeding disorders, developmental delay.    ROS:  10-point ROS negative except as in HPI     PHYSICAL EXAM:  Deferred     Ultrasound:  See today's ultrasound report under the \"imaging\" tab.    Other Imaging:   Colonoscopy, 5/2021 -   The examined portion of the ileum was normal. Moderately active (Tolliver Score 2) ulcerative colitis. Biopsied. Mild (Tolliver Score 1) pancolitis. Biopsied. One less than 5 mm polyp in the descending colon, removed with a cold biopsy forceps. Resected and retrieved.     ASSESSMENT/PLAN:  Xochitl FLOREZ" Chandra is a 26 year old  at 18w4d by 11w4d US here for [facility] consultation regarding:     # Ulcerative Colitis  The course of ulcerative colitis (UC) during pregnancy is related to the disease status at the time of conception.  Approximately one third of women will experience a relapse.  If a patient is in remission at conception or achieves remission during pregnancy it is likely she will stay in remission.  Women who have active disease at the time of conception may have worsening disease over the course of pregnancy and are likely to have active disease throughout pregnancy.    Depending on the maternal level of disease some data indicates an increased risk of low birth weight or  birth.  If the disease in remission during pregnancy UC should not have a detrimental effect on pregnancy outcome.   If severe disease is encountered in pregnancy, the pregnancy outcome is less predictable, with potential maternal and fetal adverse events.      Infliximab (Remicaide) has transplacental passage but does not appear to increase the risk of congenital defects, miscarriage, or live births. However, there is concern about  immunosuppression and inability to fight infection. Live vaccines should not be given until 6 months of age, as there are still detectable serum levels in infants up to 9 months after administration of Remicaide. Thus, many women opt to discontinue Remicaide in the third trimester, but particularly that the last dose should not be given within 2-4 weeks of delivery. If the risk of relapse or disease worsening is high, Remicaide may be continued into the third trimester after consultation with their gastroenterologist. Women taking Remicade may breastfeed as there are only trace amounts found in breast milk and the medication is poorly absorbed orally.    There is no contraindication to vaginal delivery in patients with ulcerative colitis. If the patient requires a  delivery for  obstetric indications she should be counseled on her risk of bowel complications, such as bowel obstruction, which is a not infrequent complication of  delivery in these patients.    The patient states that she is concerned about third or fourth degree lacerations at the time of vaginal delivery and wonders about  section. We reviewed that the incidence of higher degree perineal lacerations is rare and difficult to predict. Macrosomia and operative vaginal delivery increases these risks. Fetal growth will be followed throughout this pregnancy and this can be readdressed if there is concern for macrosomia, but  section would not be the default recommendation for her condition. Certainly it is understandable that rectal injury and incontinence can preclude her from successful pouch surgery,  also increases risk for intra-abdominal adhesions and future surgical complications or abnormal placentation with subsequent pregnancies if multiple  sections are performed.    Recommendations:  - Close follow-up with gastroenterology, Dr. Graham  - Continuation of current medications to control symptoms of UC disease with titration of medications per gastroenterology  - Avoid Remicaide within 2-4 weeks of delivery  - Serial growth scans starting at around 24 weeks every 4-6 weeks    The patient was seen and evaluated with Dr. Palacios.    Thank you for allowing us to participate in the care of your patient. Please do not hesitate to contact us if you have further questions regarding the management of your patient.     Miryam Gómez MD  Maternal Fetal Medicine Fellow

## 2022-08-30 ENCOUNTER — HOSPITAL ENCOUNTER (OUTPATIENT)
Dept: ULTRASOUND IMAGING | Facility: CLINIC | Age: 27
Discharge: HOME | End: 2022-08-30
Attending: OBSTETRICS & GYNECOLOGY
Payer: COMMERCIAL

## 2022-08-30 ENCOUNTER — OFFICE VISIT (OUTPATIENT)
Dept: MATERNAL FETAL MEDICINE | Facility: CLINIC | Age: 27
End: 2022-08-30
Attending: OBSTETRICS & GYNECOLOGY
Payer: COMMERCIAL

## 2022-08-30 VITALS
RESPIRATION RATE: 18 BRPM | HEART RATE: 83 BPM | DIASTOLIC BLOOD PRESSURE: 71 MMHG | OXYGEN SATURATION: 97 % | SYSTOLIC BLOOD PRESSURE: 108 MMHG

## 2022-08-30 DIAGNOSIS — K51.00 ULCERATIVE PANCOLITIS WITHOUT COMPLICATION (H): ICD-10-CM

## 2022-08-30 DIAGNOSIS — O26.90 PREGNANCY RELATED CONDITION, ANTEPARTUM: ICD-10-CM

## 2022-08-30 DIAGNOSIS — K51.90 ULCERATIVE COLITIS (H): ICD-10-CM

## 2022-08-30 PROCEDURE — G0463 HOSPITAL OUTPT CLINIC VISIT: HCPCS | Mod: 25

## 2022-08-30 PROCEDURE — 76811 OB US DETAILED SNGL FETUS: CPT | Mod: 26 | Performed by: OBSTETRICS & GYNECOLOGY

## 2022-08-30 PROCEDURE — 76811 OB US DETAILED SNGL FETUS: CPT

## 2022-08-30 PROCEDURE — G0463 HOSPITAL OUTPT CLINIC VISIT: HCPCS

## 2022-08-30 PROCEDURE — 99203 OFFICE O/P NEW LOW 30 MIN: CPT | Mod: 25 | Performed by: OBSTETRICS & GYNECOLOGY

## 2022-08-30 RX ORDER — CHLORAL HYDRATE 500 MG
2 CAPSULE ORAL DAILY
COMMUNITY

## 2022-08-30 RX ORDER — MULTIVITAMIN WITH IRON
1 TABLET ORAL DAILY
COMMUNITY
End: 2024-01-22

## 2022-08-30 RX ORDER — TURMERIC 400 MG
400 CAPSULE ORAL DAILY
COMMUNITY

## 2022-08-30 ASSESSMENT — PAIN SCALES - GENERAL: PAINLEVEL: NO PAIN (0)

## 2022-08-30 NOTE — NURSING NOTE
Xochitl seen in clinic today at 18w4d gestation for L2/MFM Consult d/t ulcerative colitis, hypothyroid, anxiety. VS obtained. Meds and allergies reviewed. Dr. Gómez and Dr. Palacios met with pt and discussed POC. Pt discharged stable and ambulatory.      Vane Hardin RN

## 2022-09-23 ENCOUNTER — HOSPITAL ENCOUNTER (EMERGENCY)
Facility: CLINIC | Age: 27
Discharge: HOME | End: 2022-09-23
Attending: EMERGENCY MEDICINE | Admitting: EMERGENCY MEDICINE
Payer: COMMERCIAL

## 2022-09-23 ENCOUNTER — APPOINTMENT (OUTPATIENT)
Dept: CT IMAGING | Facility: CLINIC | Age: 27
End: 2022-09-23
Attending: EMERGENCY MEDICINE
Payer: COMMERCIAL

## 2022-09-23 VITALS
SYSTOLIC BLOOD PRESSURE: 117 MMHG | DIASTOLIC BLOOD PRESSURE: 65 MMHG | OXYGEN SATURATION: 98 % | HEART RATE: 91 BPM | RESPIRATION RATE: 17 BRPM | TEMPERATURE: 98.3 F

## 2022-09-23 DIAGNOSIS — Z34.92 SECOND TRIMESTER PREGNANCY: ICD-10-CM

## 2022-09-23 DIAGNOSIS — R07.9 ACUTE CHEST PAIN: ICD-10-CM

## 2022-09-23 LAB
ALBUMIN SERPL-MCNC: 3 G/DL (ref 3.4–5)
ALP SERPL-CCNC: 52 U/L (ref 40–150)
ALT SERPL W P-5'-P-CCNC: 33 U/L (ref 0–50)
ANION GAP SERPL CALCULATED.3IONS-SCNC: 7 MMOL/L (ref 3–14)
AST SERPL W P-5'-P-CCNC: 20 U/L (ref 0–45)
ATRIAL RATE - MUSE: 82 BPM
ATRIAL RATE - MUSE: 85 BPM
BASOPHILS # BLD AUTO: 0.1 10E3/UL (ref 0–0.2)
BASOPHILS NFR BLD AUTO: 1 %
BILIRUB SERPL-MCNC: 0.3 MG/DL (ref 0.2–1.3)
BUN SERPL-MCNC: 10 MG/DL (ref 7–30)
CALCIUM SERPL-MCNC: 8.2 MG/DL (ref 8.5–10.1)
CHLORIDE BLD-SCNC: 106 MMOL/L (ref 94–109)
CO2 SERPL-SCNC: 23 MMOL/L (ref 20–32)
CREAT SERPL-MCNC: 0.42 MG/DL (ref 0.52–1.04)
DIASTOLIC BLOOD PRESSURE - MUSE: NORMAL MMHG
DIASTOLIC BLOOD PRESSURE - MUSE: NORMAL MMHG
EOSINOPHIL # BLD AUTO: 0.4 10E3/UL (ref 0–0.7)
EOSINOPHIL NFR BLD AUTO: 4 %
ERYTHROCYTE [DISTWIDTH] IN BLOOD BY AUTOMATED COUNT: 14.1 % (ref 10–15)
FLUAV RNA SPEC QL NAA+PROBE: NEGATIVE
FLUBV RNA RESP QL NAA+PROBE: NEGATIVE
GFR SERPL CREATININE-BSD FRML MDRD: >90 ML/MIN/1.73M2
GLUCOSE BLD-MCNC: 99 MG/DL (ref 70–99)
HCT VFR BLD AUTO: 40.1 % (ref 35–47)
HGB BLD-MCNC: 13.4 G/DL (ref 11.7–15.7)
HOLD SPECIMEN: NORMAL
IMM GRANULOCYTES # BLD: 0.1 10E3/UL
IMM GRANULOCYTES NFR BLD: 1 %
INTERPRETATION ECG - MUSE: NORMAL
INTERPRETATION ECG - MUSE: NORMAL
LIPASE SERPL-CCNC: 122 U/L (ref 73–393)
LYMPHOCYTES # BLD AUTO: 1.2 10E3/UL (ref 0.8–5.3)
LYMPHOCYTES NFR BLD AUTO: 11 %
MCH RBC QN AUTO: 32.8 PG (ref 26.5–33)
MCHC RBC AUTO-ENTMCNC: 33.4 G/DL (ref 31.5–36.5)
MCV RBC AUTO: 98 FL (ref 78–100)
MONOCYTES # BLD AUTO: 1.1 10E3/UL (ref 0–1.3)
MONOCYTES NFR BLD AUTO: 11 %
NEUTROPHILS # BLD AUTO: 7.7 10E3/UL (ref 1.6–8.3)
NEUTROPHILS NFR BLD AUTO: 72 %
NRBC # BLD AUTO: 0 10E3/UL
NRBC BLD AUTO-RTO: 0 /100
P AXIS - MUSE: 113 DEGREES
P AXIS - MUSE: 50 DEGREES
PLATELET # BLD AUTO: 268 10E3/UL (ref 150–450)
POTASSIUM BLD-SCNC: 3.6 MMOL/L (ref 3.4–5.3)
PR INTERVAL - MUSE: 114 MS
PR INTERVAL - MUSE: 126 MS
PROT SERPL-MCNC: 6.9 G/DL (ref 6.8–8.8)
QRS DURATION - MUSE: 84 MS
QRS DURATION - MUSE: 84 MS
QT - MUSE: 372 MS
QT - MUSE: 380 MS
QTC - MUSE: 434 MS
QTC - MUSE: 452 MS
R AXIS - MUSE: 116 DEGREES
R AXIS - MUSE: 56 DEGREES
RBC # BLD AUTO: 4.09 10E6/UL (ref 3.8–5.2)
RSV RNA SPEC NAA+PROBE: NEGATIVE
SARS-COV-2 RNA RESP QL NAA+PROBE: NEGATIVE
SODIUM SERPL-SCNC: 136 MMOL/L (ref 133–144)
SYSTOLIC BLOOD PRESSURE - MUSE: NORMAL MMHG
SYSTOLIC BLOOD PRESSURE - MUSE: NORMAL MMHG
T AXIS - MUSE: -41 DEGREES
T AXIS - MUSE: 20 DEGREES
TROPONIN I SERPL HS-MCNC: <3 NG/L
VENTRICULAR RATE- MUSE: 82 BPM
VENTRICULAR RATE- MUSE: 85 BPM
WBC # BLD AUTO: 10.6 10E3/UL (ref 4–11)

## 2022-09-23 PROCEDURE — 250N000009 HC RX 250: Performed by: EMERGENCY MEDICINE

## 2022-09-23 PROCEDURE — 87637 SARSCOV2&INF A&B&RSV AMP PRB: CPT | Performed by: EMERGENCY MEDICINE

## 2022-09-23 PROCEDURE — 36415 COLL VENOUS BLD VENIPUNCTURE: CPT | Performed by: EMERGENCY MEDICINE

## 2022-09-23 PROCEDURE — 250N000011 HC RX IP 250 OP 636: Performed by: EMERGENCY MEDICINE

## 2022-09-23 PROCEDURE — 93005 ELECTROCARDIOGRAM TRACING: CPT | Mod: 76

## 2022-09-23 PROCEDURE — C9803 HOPD COVID-19 SPEC COLLECT: HCPCS

## 2022-09-23 PROCEDURE — 71275 CT ANGIOGRAPHY CHEST: CPT

## 2022-09-23 PROCEDURE — 80053 COMPREHEN METABOLIC PANEL: CPT | Performed by: EMERGENCY MEDICINE

## 2022-09-23 PROCEDURE — 84484 ASSAY OF TROPONIN QUANT: CPT | Performed by: EMERGENCY MEDICINE

## 2022-09-23 PROCEDURE — 93005 ELECTROCARDIOGRAM TRACING: CPT

## 2022-09-23 PROCEDURE — 99285 EMERGENCY DEPT VISIT HI MDM: CPT | Mod: 25

## 2022-09-23 PROCEDURE — 83690 ASSAY OF LIPASE: CPT | Performed by: EMERGENCY MEDICINE

## 2022-09-23 PROCEDURE — 85025 COMPLETE CBC W/AUTO DIFF WBC: CPT | Performed by: EMERGENCY MEDICINE

## 2022-09-23 RX ORDER — IOPAMIDOL 755 MG/ML
63 INJECTION, SOLUTION INTRAVASCULAR ONCE
Status: COMPLETED | OUTPATIENT
Start: 2022-09-23 | End: 2022-09-23

## 2022-09-23 RX ADMIN — SODIUM CHLORIDE 88 ML: 900 INJECTION INTRAVENOUS at 19:39

## 2022-09-23 RX ADMIN — IOPAMIDOL 63 ML: 755 INJECTION, SOLUTION INTRAVENOUS at 19:38

## 2022-09-23 ASSESSMENT — ACTIVITIES OF DAILY LIVING (ADL)
ADLS_ACUITY_SCORE: 35
ADLS_ACUITY_SCORE: 35

## 2022-09-23 NOTE — ED TRIAGE NOTES
Pt 22 weeks and had onset of CP this AM.  States felt palpitations earlier today.  Denies SOB.  Abnormal EKG.      Triage Assessment     Row Name 09/23/22 5715       Triage Assessment (Adult)    Airway WDL WDL       Respiratory WDL    Respiratory WDL WDL       Skin Circulation/Temperature WDL    Skin Circulation/Temperature WDL WDL       Cardiac WDL    Cardiac WDL X;chest pain       Chest Pain Assessment    Chest Pain Location anterior chest, left       Peripheral/Neurovascular WDL    Peripheral Neurovascular WDL WDL       Cognitive/Neuro/Behavioral WDL    Cognitive/Neuro/Behavioral WDL WDL

## 2022-09-23 NOTE — ED PROVIDER NOTES
History   Chief Complaint:  Chest Pain     The history is provided by the patient.   History supplemented by electronic chart review and  at bedside    Xochitl Artis is a 27 year old pregnant female with history of anxiety who presents with left-sided upper chest tightness for the last hour. The patient reports having a sudden onset of heart palpitations approximately 4 hours ago while driving.  About two hours later, she developed chest tightness and discomfort which has been persisting since. Prior to presentation, she called her primary care provider who advised her to present to the ED as the patient is 22 weeks pregnant. Of note, the patient has had similar, but centralized, chest tightness in the past as a result of anxiety. She believes this discomfort is different as it is more left sided. The patient also endorses having a slight dry cough for the last 4-5 days but denies any recent fevers. She also denies any new rash on her chest or leg swelling and has had no vaginal or abdominal symptoms. The patient does have ulcerative colitis but notes that her symptoms are controlled and that she has been having normal bowel movements. She has had no recent injuries and has no personal or family history of blood clots.     Review of Systems   All other systems reviewed and are negative.    Allergies:  The patient has no known allergies.     Medications:  Uceris  Epidolex  Remicade  Levothyroxine  Zoloft  Aldactone  Cytomel  Citrucel    Past Medical History:     Ulcerative colitis  Immunosuppression  Somatic dysfunction of lumbar region  Muscle hypertonicity  Somatic dysfunction, pelvic and cervical region  Subclinical hypothyroidism  CONSTANCE  ADHD  Anxiety   MRSA    Past Surgical History:    Colonoscopy  Strabismus surgery   Right ankle fracture surgery   Dental surgery     Family History:    Hypertension     Social History:  The patient presents to the ED with her .  The patient works as a  .  PCP: Jillian Tejada MD     Physical Exam     Patient Vitals for the past 24 hrs:   BP Temp Temp src Pulse Resp SpO2   09/23/22 1756 117/65 98.3  F (36.8  C) Oral 91 17 98 %     Physical Exam  General: Woman sitting upright in room 11,  at bedside  HENT: mucous membranes moist  CV: rate as above, regular rhythm, no lower extremity edema, no JVD, palpable symmetric radial pulses, no murmur audible  Resp: normal effort, speaks in full phrases, no stridor, no cough observed  GI: abdomen soft and gravid uterus palpable just above umbilicus, nontender, no guarding, negative Addison's sign  MSK: no bony tenderness to chest  Skin: appropriately warm and dry, no erythema or vesicles to chest wall  Neuro: alert, clear speech, oriented  Psych: cooperative, anxious, pleasant    Emergency Department Course   ECG #1  ECG taken at 1752, ECG read at 1814  Normal sinus rhythm  T wave abnormality, consider inferior ischemia  T wave abnormality, consider anterolateral ischemia  Rate 88 bpm. IL interval 124 ms. QRS duration 84 ms. QT/QTc 368/445 ms. P-R-T axis 61 66 -19.     ECG #2  ECG taken at 1844, ECG read at 1859  Normal sinus rhythm  T wave abnormality, consider anterior ischemia  IRBBB  Rate 85 bpm. IL interval 126 ms. QRS duration 84 ms. QT/QTc 380/452 ms. P-R-T axis 50 56 20.     Imaging:  CT Chest Pulmonary Embolism w Contrast   Final Result   IMPRESSION:   1.  The lung bases were not included on this study.      2.  No PE, dissection, or aneurysm.        Report per radiology    Laboratory:  Labs Ordered and Resulted from Time of ED Arrival to Time of ED Departure   COMPREHENSIVE METABOLIC PANEL - Abnormal       Result Value    Sodium 136      Potassium 3.6      Chloride 106      Carbon Dioxide (CO2) 23      Anion Gap 7      Urea Nitrogen 10      Creatinine 0.42 (*)     Calcium 8.2 (*)     Glucose 99      Alkaline Phosphatase 52      AST 20      ALT 33      Protein Total 6.9      Albumin 3.0 (*)      Bilirubin Total 0.3      GFR Estimate >90     LIPASE - Normal    Lipase 122     TROPONIN I - Normal    Troponin I High Sensitivity <3     INFLUENZA A/B & SARS-COV2 PCR MULTIPLEX - Normal    Influenza A PCR Negative      Influenza B PCR Negative      RSV PCR Negative      SARS CoV2 PCR Negative     CBC WITH PLATELETS AND DIFFERENTIAL    WBC Count 10.6      RBC Count 4.09      Hemoglobin 13.4      Hematocrit 40.1      MCV 98      MCH 32.8      MCHC 33.4      RDW 14.1      Platelet Count 268      % Neutrophils 72      % Lymphocytes 11      % Monocytes 11      % Eosinophils 4      % Basophils 1      % Immature Granulocytes 1      NRBCs per 100 WBC 0      Absolute Neutrophils 7.7      Absolute Lymphocytes 1.2      Absolute Monocytes 1.1      Absolute Eosinophils 0.4      Absolute Basophils 0.1      Absolute Immature Granulocytes 0.1      Absolute NRBCs 0.0        Emergency Department Course:     Reviewed:  I reviewed nursing notes, vitals, past medical history and Care Everywhere    Assessments:  1816 I obtained history and examined the patient as noted above.   1949 I rechecked the patient and explained findings.   2118 I rechecked the patient and explained findings I believe that they are safe for discharge at this time.    Disposition:  The patient was discharged to home.     Impression & Plan     Medical Decision Making:  The cause of her presenting symptoms is unconfirmed despite extensive testing as above.  Her EKG is slightly abnormal though without clear ischemia, and her negative troponin which is actually completely undetectable adequately rules out an acute coronary syndrome such that further cardiac evaluation is not currently indicated.  Also consider pericarditis, pericardial effusion, and PE, for which we had an extended discussion regarding various diagnostic approaches.  In the end, given relatively high suspicion for acute pleuritic lateralizing chest pain and is 22-week pregnant woman, we agreed to  proceed directly to CT imaging for definitive diagnosis.  Fortunately this does not reveal any evidence of PE, pneumothorax, pulmonary infiltrate, or other worrisome features.  The radiologist comments that the far lower lungs were not included, her symptoms are in the left upper chest such as I do feel the imaging study included the relevant anatomic regions.  She repeatedly declined any treatments.  We discussed that anxiety as a possible cause of this cannot be confirmed.  She does not have any abdominal symptoms, and her fetal heart rate is in the 150s checked by bedside Doppler, I do not think that further immediate obstetric specific evaluation is indicated.  Reassurance was provided acknowledging the diagnostic limitations of today's evaluation.  Return for sudden worsening otherwise follow-up through her OB clinic early this coming week.    Diagnosis:    ICD-10-CM    1. Acute chest pain  R07.9    2. Second trimester pregnancy  Z34.92      Scribe Disclosure:  I, Shaila Sarthak, am serving as a scribe at 6:14 PM on 9/23/2022 to document services personally performed by Trey Garrison MD based on my observations and the provider's statements to me.          Trey Garrison MD  09/23/22 7715

## 2022-10-12 ENCOUNTER — DOCUMENTATION ONLY (OUTPATIENT)
Dept: PHARMACY | Facility: CLINIC | Age: 27
End: 2022-10-12

## 2022-10-12 NOTE — PROGRESS NOTES
Skilled Nurse visit in the Patient Home to administer Remicade (infliximab) 400 mg. No recent elevated temperature, fever, chills, productive cough, coughing for 3 weeks or longer or hemoptysis, abnormal vital signs, night sweats, chest pain. No decrease in your appetite, unexplained weight loss or fatigue.  No other new onset medical symptoms. Current weight 165lb. Peripheral IV left AC, 1 attempt. Infusion completed without complication or reaction. Pt reports therapy is effective in managing symptoms related to therapy.

## 2022-10-15 ENCOUNTER — HEALTH MAINTENANCE LETTER (OUTPATIENT)
Age: 27
End: 2022-10-15

## 2022-11-07 NOTE — PROGRESS NOTES
Hepatitis and TB testing not completed. TORB: Okay to proceed with stelara infusion without lab results. Maria Luisa Dunbar   Burow's Advancement Flap Text: The defect edges were debeveled with a #15 scalpel blade.  Given the location of the defect and the proximity to free margins a Burow's advancement flap was deemed most appropriate.  Using a sterile surgical marker, the appropriate advancement flap was drawn incorporating the defect and placing the expected incisions within the relaxed skin tension lines where possible.    The area thus outlined was incised deep to adipose tissue with a #15 scalpel blade.  The skin margins were undermined to an appropriate distance in all directions utilizing iris scissors.

## 2022-12-07 ENCOUNTER — DOCUMENTATION ONLY (OUTPATIENT)
Dept: PHARMACY | Facility: CLINIC | Age: 27
End: 2022-12-07

## 2022-12-07 ENCOUNTER — LAB REQUISITION (OUTPATIENT)
Dept: LAB | Facility: CLINIC | Age: 27
End: 2022-12-07
Payer: COMMERCIAL

## 2022-12-07 DIAGNOSIS — K51.90 ULCERATIVE COLITIS, UNSPECIFIED, WITHOUT COMPLICATIONS (H): ICD-10-CM

## 2022-12-07 LAB
ALBUMIN SERPL-MCNC: 2.6 G/DL (ref 3.4–5)
ALP SERPL-CCNC: 77 U/L (ref 40–150)
ALT SERPL W P-5'-P-CCNC: 28 U/L (ref 0–50)
ANION GAP SERPL CALCULATED.3IONS-SCNC: 5 MMOL/L (ref 3–14)
AST SERPL W P-5'-P-CCNC: 19 U/L (ref 0–45)
BASOPHILS # BLD AUTO: 0.1 10E3/UL (ref 0–0.2)
BASOPHILS NFR BLD AUTO: 0 %
BILIRUB DIRECT SERPL-MCNC: <0.1 MG/DL (ref 0–0.2)
BILIRUB SERPL-MCNC: 0.3 MG/DL (ref 0.2–1.3)
BUN SERPL-MCNC: 7 MG/DL (ref 7–30)
CALCIUM SERPL-MCNC: 8.1 MG/DL (ref 8.5–10.1)
CHLORIDE BLD-SCNC: 107 MMOL/L (ref 94–109)
CO2 SERPL-SCNC: 22 MMOL/L (ref 20–32)
CREAT SERPL-MCNC: 0.45 MG/DL (ref 0.52–1.04)
CRP SERPL-MCNC: 3.3 MG/L (ref 0–8)
EOSINOPHIL # BLD AUTO: 0.2 10E3/UL (ref 0–0.7)
EOSINOPHIL NFR BLD AUTO: 2 %
ERYTHROCYTE [DISTWIDTH] IN BLOOD BY AUTOMATED COUNT: 12.2 % (ref 10–15)
ERYTHROCYTE [SEDIMENTATION RATE] IN BLOOD BY WESTERGREN METHOD: 34 MM/HR (ref 0–20)
GFR SERPL CREATININE-BSD FRML MDRD: >90 ML/MIN/1.73M2
GLUCOSE BLD-MCNC: 124 MG/DL (ref 70–99)
HCT VFR BLD AUTO: 39.1 % (ref 35–47)
HGB BLD-MCNC: 13.4 G/DL (ref 11.7–15.7)
HOLD SPECIMEN: NORMAL
IMM GRANULOCYTES # BLD: 0.2 10E3/UL
IMM GRANULOCYTES NFR BLD: 1 %
LYMPHOCYTES # BLD AUTO: 2.3 10E3/UL (ref 0.8–5.3)
LYMPHOCYTES NFR BLD AUTO: 17 %
MCH RBC QN AUTO: 33 PG (ref 26.5–33)
MCHC RBC AUTO-ENTMCNC: 34.3 G/DL (ref 31.5–36.5)
MCV RBC AUTO: 96 FL (ref 78–100)
MONOCYTES # BLD AUTO: 0.7 10E3/UL (ref 0–1.3)
MONOCYTES NFR BLD AUTO: 6 %
NEUTROPHILS # BLD AUTO: 9.9 10E3/UL (ref 1.6–8.3)
NEUTROPHILS NFR BLD AUTO: 74 %
NRBC # BLD AUTO: 0 10E3/UL
NRBC BLD AUTO-RTO: 0 /100
PLATELET # BLD AUTO: 324 10E3/UL (ref 150–450)
POTASSIUM BLD-SCNC: 3.9 MMOL/L (ref 3.4–5.3)
PROT SERPL-MCNC: 6.9 G/DL (ref 6.8–8.8)
RBC # BLD AUTO: 4.06 10E6/UL (ref 3.8–5.2)
SODIUM SERPL-SCNC: 134 MMOL/L (ref 133–144)
WBC # BLD AUTO: 13.3 10E3/UL (ref 4–11)

## 2022-12-07 PROCEDURE — 85025 COMPLETE CBC W/AUTO DIFF WBC: CPT | Performed by: INTERNAL MEDICINE

## 2022-12-07 PROCEDURE — 85652 RBC SED RATE AUTOMATED: CPT | Performed by: INTERNAL MEDICINE

## 2022-12-07 PROCEDURE — 80053 COMPREHEN METABOLIC PANEL: CPT | Performed by: INTERNAL MEDICINE

## 2022-12-07 PROCEDURE — 86140 C-REACTIVE PROTEIN: CPT | Performed by: INTERNAL MEDICINE

## 2022-12-07 PROCEDURE — 82248 BILIRUBIN DIRECT: CPT | Performed by: INTERNAL MEDICINE

## 2022-12-07 NOTE — PROGRESS NOTES
Skilled Nurse visit in the Patient Home to administer Remicade (infliximab) 400 mg. No recent elevated temperature, fever, chills, productive cough, coughing for 3 weeks or longer or hemoptysis, abnormal vital signs, night sweats, chest pain. No decrease in your appetite, unexplained weight loss or fatigue. No other new onset medical symptoms. Current weight 175lb. Peripheral IV left AC, 1 attempt. Labs drawn hepatic panel, CPRI, ESR, BMP, CBCD. Infusion completed without complication or reaction. Pt reports therapy is effective in managing symptoms related to therapy.

## 2022-12-16 ENCOUNTER — PATIENT OUTREACH (OUTPATIENT)
Dept: GASTROENTEROLOGY | Facility: CLINIC | Age: 27
End: 2022-12-16

## 2022-12-16 NOTE — PROGRESS NOTES
Patient has been following with [facility] clinic during her pregnancy, but is overdue for a visit with Dr. Dunbar. Also overdue for FCP sample.    Called the patient. Scheduled her for an appointment with Dr. Dunbar 12/28. Will mail an FCP kit to the patient and she will submit a sample as soon as she is able.     Her due date is 1/27/23. She will be induced on 1/20/23, per Dr. Dunbar's plan. Her next Remicade dose is scheduled for 2/1/23.    She feels great and has had no symptoms of a UC flare during her pregnancy. Her baby boy had his last growth scan 2 weeks ago and measured in the 55th percentile for weight.

## 2022-12-19 ENCOUNTER — DOCUMENTATION ONLY (OUTPATIENT)
Dept: GASTROENTEROLOGY | Facility: CLINIC | Age: 27
End: 2022-12-19

## 2022-12-19 NOTE — PROGRESS NOTES
Fecal davian kit mailed via [employer] to patient's home address listed in chart.     SK, Visit Facilitator

## 2022-12-28 ENCOUNTER — VIRTUAL VISIT (OUTPATIENT)
Dept: GASTROENTEROLOGY | Facility: CLINIC | Age: 27
End: 2022-12-28
Payer: COMMERCIAL

## 2022-12-28 DIAGNOSIS — K51.00 ULCERATIVE PANCOLITIS (H): Primary | ICD-10-CM

## 2022-12-28 PROCEDURE — 99215 OFFICE O/P EST HI 40 MIN: CPT | Mod: 95 | Performed by: INTERNAL MEDICINE

## 2022-12-28 NOTE — LETTER
"    12/28/2022         RE: Xochitl Artis  [address]  [facility] [address]        Dear Colleague,    Thank you for referring your patient, Xochitl Artis, to the [facility]. Please see a copy of my visit note below.    Xochitl is a 27 year old who is being evaluated via a billable video visit.    Patient does not know her weight.  Patient will finish QNRS on line.     How would you like to obtain your AVS? MyChart  If the video visit is dropped, the invitation should be resent by: Send to e-mail at: [email or web address]  Will anyone else be joining your video visit? No        Video-Visit Details    Type of service:  Video Visit     Originating Location (pt. Location): Home    Distant Location (provider location):  Off-site  Platform used for Video Visit: Nikunj Leung is a 26 year old who is being evaluated via a billable video visit.      How would you like to obtain your AVS? MyChart  If the video visit is dropped, the invitation should be resent by: Send to e-mail at: [email or web address]  Will anyone else be joining your video visit? No       [place]      Video-Visit Details    Video Start Time: 12:11 PM    Type of service:  Video Visit    Video End Time:12:45 PM    Originating Location (pt. Location): Home    Distant Location (provider location):  [facility] [place]     Platform used for Video Visit: Nikunj Caputo is a 25 year old female who is being evaluated via a billable video visit.      The patient has been notified of following:     \"This video visit will be conducted via a call between you and your physician/provider. We have found that certain health care needs can be provided without the need for an in-person physical exam.  This service lets us provide the care you need with a video conversation.  If a prescription is necessary we can send it directly to your pharmacy.  If lab work " "is needed we can place an order for that and you can then stop by our lab to have the test done at a later time.    If during the course of the call the physician/provider feels a video visit is not appropriate, you will not be charged for this service.\"     Patient confirmed that they are in [place] for today's visit yes    If the video visit is dropped, please send link to:   Text to cell phone: [phone]       [facility] UC follow up       PATIENT: Xochitl Caputo    MRN: [id number]    Date of Birth 1995    Tel: [phone] (home) NONE (work)    PCP: Jillian Tejada MD     HPI: Ms. Caputo is a 25 year old female here to establish care for UC.     UC history  Was sick before she was diagnosed in 2017. She was seen at [facility] prior to this and was told she had IBS and dehydration.  Hgb was 5.9. Switched care to [facility]. Tried Lialda, imuran, Humira. Transitioned to Entyvio     and has been in symptomatic remission from 11/2018-6/2020. Around June, developed a flare in the setting of psychological stress (pandemic, riots, etc). Lost 22 lb, was on 2 courses prednisone (9/17-present). Currently on 20 mg daily.  Had 3 Entyvio infusions on q4w dosing at this point. Had a flex sig 9/16/2020 (below).     Screening appointment for FMT study here at the .   8/2018 established care with [place] [facility] (integrative medicine) which helped. Switched to [facility] about a year ago.     Meds: Entyvio q4w, low dose naltrexone, levothyroxine, spirinolactone (acne)  Supplements: inflamax (powder), fish oil, reversatrol extract, tumeric with black pepper (740 mg), S. Myah, VSL #3     Gets acupuncture and recently started LED light therapy (a belt of LED lights)    Macroscopic extent of disease (most recent) E3    Current UC symptoms    Bowel frequency in day 3-4   Bowel frequency in night none  Urgency of defecation YES occasionally, mild   Blood in stool only with wiping " "  General well being 2 = poor  Extracolonic features (multiple select) none     Constitutional symptoms:  Fever NO  Weight loss YES 22 lb since June     Noteworthy diet history- GFD, very low dairy, avoids eggs, avoid processed foods/sugars. Avoids raw veggies when flaring.     Total number of IBD surgeries (except perianal): 0    Current IBD Medications:  Entyvio q4w  Prednisone 20 mg daily     Past IBD Medications:   Imuran - got PNA, \"didn't feel like myself\"   Humira -- primary nonresponder   Liaonela    Interval history, 11/2020 (video visit)  Has been on prednisone 25 mg daily but can't taper off this further (loose, frequent stools and more blood).   Reports no response to Humira in the past; was on this for 2 months or so. Levels were not checked and did not escalate to EW dosing.     Interval history, 12/2020 (video visit)  Currently on 20 mg prednisone daily. Having some blood, but less than before.   Continues on budesonide foam, which is helpful (but had a setback in the s/o of menstruation).   Had Stelara infusion on 11/24/2020.   Met with Dr. Jeffries on 11/23 with goals to lower fat and increase soluble fiber, follow IBD AID diet.     Current UC symptoms  Bowel frequency in day loose stools in the morning (2) and in the evening (1) (not in the afternoon)  Bowel frequency in night gas +/- stool  Urgency of defecation YES occasionally, mild   Blood in stool 50%; alternatives between the toilet and only with wiping   General well being 2 = poor  Extracolonic features (multiple select) none     Interval history, 1/2021 (video visit)  Off prednisone x 3 days. Continues on budesonide foam x .   Has been experiencing constipation and a mildly painful hemorrhoid.  Last Stelara injection was 1/20/2021    Current UC symptoms  Bowel frequency in day 3 (except 5/day with menstruation)  Bowel frequency in night none  Urgency of defecation No  Blood in stool none    General well being 0 = very well  Extracolonic " features (multiple select) none      Interval history, 6/2022 (video visit)  Currently 10 w pregnant and feeling well. No morning sickness. Some fatigue, improved after week #8. FC on 6/6 was 173. Had some blood in stool around 6/17 which resolved with budesonide foam.   Feeling well from a GI standpoint.     Remicade going well. No breakthrough or waning symptoms. In 10/2021, trough level was 14.     Switching ob specialists to [facility] in [place].   Plans to travel to [facility] at around 22 weeks gestation (Sep 2-12)    Current UC symptoms  Bowel frequency in day 2-3   Bowel frequency in night none  Urgency of defecation No  Blood in stool none    General well being 0 = very well  Extracolonic features (multiple select) none       Interval history, 12/2022 (video visit)  Currently 35w4d pregnant.   Continues on Remicade every 8 weeks monotherapy. Last infusion was on 12/7. Next infusion 2/1. Will be induced at 39 weeks on 1/20.     Doing well from a mental health standpoint; continues on sertraline (managed by PCP). Not seeing a therapist currently.      Current UC symptoms  Bowel frequency in day 2-3   Bowel frequency in night none  Urgency of defecation No  Blood in stool none    General well being 0 = very well  Extracolonic features (multiple select) none     Past Medical History:   Diagnosis Date     Acne      Hypothyroidism      Ulcerative colitis (H)      Ulcerative colitis (H) 10/28/2020        Past Surgical History:   Procedure Laterality Date     COLONOSCOPY N/A 5/11/2021    Procedure: COLONOSCOPY, WITH POLYPECTOMY AND BIOPSY;  Surgeon: Poonam Dunbar MD;  Location: [facility] OR     EYE SURGERY       ORTHOPEDIC SURGERY      right ankle.       Social History     Tobacco Use     Smoking status: Never     Smokeless tobacco: Never   Substance Use Topics     Alcohol use: Not on file       Family History   Problem Relation Age of Onset     Colitis Maternal Grandfather      Ulcerative Colitis Maternal Grandfather       Crohn's Disease No family hx of      GERD No family hx of      Stomach Cancer No family hx of        No Known Allergies     Outpatient Encounter Medications as of 12/28/2022   Medication Sig Dispense Refill     citrulline (L-CITRULLINE) 600 MG Take 600 mg by mouth 3 times daily       dhea 25 MG TABS Take 25 mg by mouth daily       fish oil-omega-3 fatty acids 1000 MG capsule Take 2 g by mouth daily       inFLIXimab (REMICADE IV)        levothyroxine (SYNTHROID/LEVOTHROID) 50 MCG tablet Take 50 mcg by mouth       sertraline (ZOLOFT) 50 MG tablet Take 50 mg by mouth daily       Turmeric 450 MG CAPS        Vitamin D3 (CHOLECALCIFEROL) 125 MCG (5000 UT) tablet Take 1 tablet by mouth every other day       budesonide (UCERIS) 9 MG 24 hr tablet Take 1 tablet (9 mg) by mouth every morning (Patient not taking: Reported on 12/28/2022) 30 tablet 0     calcium carbonate (OS-KATIA) 1500 (600 Ca) MG tablet Take 600 mg by mouth daily  (Patient not taking: Reported on 8/30/2022)       cannabidiol (EPIDIOLEX) 100 MG/ML oral solution Take 30 mg by mouth (Patient not taking: Reported on 6/29/2022)       magnesium 250 MG tablet Take 1 tablet by mouth daily (Patient not taking: Reported on 12/28/2022)       multivitamin w/minerals (THERA-VIT-M) tablet Take 1 tablet by mouth 2 times daily  (Patient not taking: Reported on 6/29/2022)       spironolactone (ALDACTONE) 50 MG tablet Take 50 mg by mouth (Patient not taking: Reported on 6/29/2022)       No facility-administered encounter medications on file as of 12/28/2022.      NSAID  none    Review of Systems  Complete 10 System ROS performed. All are negative except as documented below, in the HPI, or in patient questionnaire from today's visit.    1) Constitutional: No fevers, chills, night sweats or malaise, weight loss or gain  2) Skin: No rash  3) Pulmonary: No wheeze, SOB, cough, sputum or hemoptysis  4) Cardiovascular: No Chest pain or palpitations  5) Genitourinary: No blood in urine  or dysuria  6) Endocrine: No increased sweating, hunger, thirst or thyroid problems  7) Hematologic: No bruising and easy bleeding  8) Musculoskeletal: no new pain in joints or limitation in ROM  9) Neurologic: No dizziness, paresthesias or weakness or falls  10) Psychiatric:  not depressed/anxious, no sleep problems    PHYSICAL EXAM  Vitals: [facility] 04/20/2022     No Pain (0)       General appearance  Healthy appearing adult, in no acute distress     Eyes  Sclera anicteric  Pupils round and reactive to light     Ears, nose, mouth and throat  No obvious external lesions of ears and nose  Hearing intact     Neck  Symmetric  No obvious external lesions     Respiratory  Normal respiration, no use of accessory muscles      MSK  Gait normal     Skin  No rashes or jaundice      Psychiatric  Oriented to person, place and time  Appropriate mood and affect.     DATA:  Reviewed in detail past documentation, medications and prior workup available in electronic health records or through outside records.    PERTINENT STUDIES:  Fecal calprotectin 6/2022 173     Most recent CBC:  WBC   Date Value Ref Range Status   11/24/2020 14.6 (H) 4.0 - 11.0 10e9/L Final     WBC Count   Date Value Ref Range Status   12/07/2022 13.3 (H) 4.0 - 11.0 10e3/uL Final   ]  Hemoglobin   Date Value Ref Range Status   12/07/2022 13.4 11.7 - 15.7 g/dL Final   11/24/2020 11.2 (L) 11.7 - 15.7 g/dL Final   ]   Platelet Count   Date Value Ref Range Status   12/07/2022 324 150 - 450 10e3/uL Final   11/24/2020 408 150 - 450 10e9/L Final     Most recent hepatic panel:  AST   Date Value Ref Range Status   12/07/2022 19 0 - 45 U/L Final   11/24/2020 6 0 - 45 U/L Final     ALT   Date Value Ref Range Status   12/07/2022 28 0 - 50 U/L Final   11/24/2020 14 0 - 50 U/L Final     No results found for: BILICONJ   Bilirubin Total   Date Value Ref Range Status   12/07/2022 0.3 0.2 - 1.3 mg/dL Final   11/24/2020 0.2 0.2 - 1.3 mg/dL Final     Albumin   Date Value Ref Range  Status   12/07/2022 2.6 (L) 3.4 - 5.0 g/dL Final   11/24/2020 2.9 (L) 3.4 - 5.0 g/dL Final     Alkaline Phosphatase   Date Value Ref Range Status   12/07/2022 77 40 - 150 U/L Final   11/24/2020 51 40 - 150 U/L Final       Most recent creatinine:  Creatinine   Date Value Ref Range Status   12/07/2022 0.45 (L) 0.52 - 1.04 mg/dL Final   05/11/2021 0.68 0.52 - 1.04 mg/dL Final       Endoscopy:     Flex sig 9/2020 showed Tolliver 3 colitis from rectum to descending colon    icscope 9/2019:  Impression:   - The examined portion of the ileum                        was normal.                       - One 10 to 11 mm polyp in the                        sigmoid colon, removed with a hot                        snare. Resected and retrieved.                       - Pancolitis. Inflammation was found                        from the anus to the cecum. This was                        mild in severity and graded as Tolliver                        Score 1 (mild disease). Biopsied.      icscope 8/2017 showed E3 colitis (severity unclear): Congested, erythematous and eroded                        mucosa in the entire examined colon.                        Biopsied from a) right colon and b)                        left colon. Findings consistent with                        ulcerative pan colitis.    Specimens:   A) - Colon, , Right colon bx's                                                                      B) - Colon, , Left colon bx's                                                                       C) - Colon, sigmoid                                                                        FINAL DIAGNOSIS A.  Colon, right, biopsy -- Colonic mucosal fragments with minimal crypt architectural distortion and minimal, focal activity    B.  Colon, left, biopsy -- Colonic mucosal fragments with minimal crypt architectural distortion and minimal, focal activity    C.  Colon, sigmoid, polypectomy -- Inflammatory polyp       IMPRESSION:    Ms.  Harshal is a 27 year old here with pan UC on Remicade 5 mg/kg q8w monotherapy doing well. She is currently 35w4d pregnant. She is in clinical remission.    We will proceed with the following:    DIAGNOSIS:  # Pregnant, T3  # Pan UC, on Remicade 5 mg/kg q8 w      PLAN:  --Continue Remicade at current schedule  ------Labs with every infusion  --Check fecal davian now and post partum  --Continue increased fetal monitoring   --Agree with induction of delivery at 39 weeks  --Xochitl will reach out to myself and/or her PCP if she experiences mental health challenges postpartum  --Avoid rotavirus vaccine in baby  --OK to breastfeed while on Remicade   --Cscope for disease activity assessment when able postpartum about 6-12 months after delivery (will order during next visit)     Misc:  -- Avoid tobacco use  -- Avoid NSAIDs as there is potentially a 25% chance of causing an IBD flare    Return in about 6 months (around 6/28/2023).    40 minutes spent on the date of the encounter performing chart review, history and exam, documentation and further activities as noted above.     Poonam Dunbar MD   of Medicine  Division of Gastroenterology, Hepatology and Nutrition  [facility]    Answers for HPI/ROS submitted by the patient on 12/28/2022  General Symptoms: No  Skin Symptoms: No  HENT Symptoms: No  EYE SYMPTOMS: No  HEART SYMPTOMS: No  LUNG SYMPTOMS: No  INTESTINAL SYMPTOMS: No  URINARY SYMPTOMS: No  GYNECOLOGIC SYMPTOMS: No  BREAST SYMPTOMS: No  SKELETAL SYMPTOMS: No  BLOOD SYMPTOMS: No  NERVOUS SYSTEM SYMPTOMS: No  MENTAL HEALTH SYMPTOMS: No        Again, thank you for allowing me to participate in the care of your patient.      Sincerely,    Poonam Dunbar MD

## 2022-12-28 NOTE — PROGRESS NOTES
"Xochitl is a 27 year old who is being evaluated via a billable video visit.    Patient does not know her weight.  Patient will finish QNRS on line.     How would you like to obtain your AVS? MyChart  If the video visit is dropped, the invitation should be resent by: Send to e-mail at: [email or web address]  Will anyone else be joining your video visit? No        Video-Visit Details    Type of service:  Video Visit     Originating Location (pt. Location): Home    Distant Location (provider location):  Off-site  Platform used for Video Visit: iNkunj Leung is a 26 year old who is being evaluated via a billable video visit.      How would you like to obtain your AVS? Uzmahart  If the video visit is dropped, the invitation should be resent by: Send to e-mail at: [email or web address]  Will anyone else be joining your video visit? No       [place]      Video-Visit Details    Video Start Time: 12:11 PM    Type of service:  Video Visit    Video End Time:12:45 PM    Originating Location (pt. Location): Home    Distant Location (provider location):  [facility] [place]     Platform used for Video Visit: Nikunj Caputo is a 25 year old female who is being evaluated via a billable video visit.      The patient has been notified of following:     \"This video visit will be conducted via a call between you and your physician/provider. We have found that certain health care needs can be provided without the need for an in-person physical exam.  This service lets us provide the care you need with a video conversation.  If a prescription is necessary we can send it directly to your pharmacy.  If lab work is needed we can place an order for that and you can then stop by our lab to have the test done at a later time.    If during the course of the call the physician/provider feels a video visit is not appropriate, you will not be charged for this service.\" "     Patient confirmed that they are in [place] for today's visit yes    If the video visit is dropped, please send link to:   Text to cell phone: [phone]       [facility] UC follow up       PATIENT: Xochitl Caputo    MRN: [id number]    Date of Birth 1995    Tel: [phone] (home) NONE (work)    PCP: Jillian Tejada MD     HPI: Ms. Caputo is a 25 year old female here to establish care for UC.     UC history  Was sick before she was diagnosed in 2017. She was seen at [facility] prior to this and was told she had IBS and dehydration.  Hgb was 5.9. Switched care to [facility]. Tried Lialda, imuran, Humira. Transitioned to Entyvio     and has been in symptomatic remission from 11/2018-6/2020. Around June, developed a flare in the setting of psychological stress (pandemic, riots, etc). Lost 22 lb, was on 2 courses prednisone (9/17-present). Currently on 20 mg daily.  Had 3 Entyvio infusions on q4w dosing at this point. Had a flex sig 9/16/2020 (below).     Screening appointment for FMT study here at the .   8/2018 established care with [place] [facility] (integrative medicine) which helped. Switched to [facility] about a year ago.     Meds: Entyvio q4w, low dose naltrexone, levothyroxine, spirinolactone (acne)  Supplements: inflamax (powder), fish oil, reversatrol extract, tumeric with black pepper (740 mg), S. Myah, VSL #3     Gets acupuncture and recently started LED light therapy (a belt of LED lights)    Macroscopic extent of disease (most recent) E3    Current UC symptoms    Bowel frequency in day 3-4   Bowel frequency in night none  Urgency of defecation YES occasionally, mild   Blood in stool only with wiping   General well being 2 = poor  Extracolonic features (multiple select) none     Constitutional symptoms:  Fever NO  Weight loss YES 22 lb since June     Noteworthy diet history- GFD, very low dairy, avoids eggs, avoid processed foods/sugars. Avoids raw veggies  "when flaring.     Total number of IBD surgeries (except perianal): 0    Current IBD Medications:  Entyvio q4w  Prednisone 20 mg daily     Past IBD Medications:   Imuran - got PNA, \"didn't feel like myself\"   Humira -- primary nonresponder   Renaa    Interval history, 11/2020 (video visit)  Has been on prednisone 25 mg daily but can't taper off this further (loose, frequent stools and more blood).   Reports no response to Humira in the past; was on this for 2 months or so. Levels were not checked and did not escalate to EW dosing.     Interval history, 12/2020 (video visit)  Currently on 20 mg prednisone daily. Having some blood, but less than before.   Continues on budesonide foam, which is helpful (but had a setback in the s/o of menstruation).   Had Stelara infusion on 11/24/2020.   Met with Dr. Jeffries on 11/23 with goals to lower fat and increase soluble fiber, follow IBD AID diet.     Current UC symptoms  Bowel frequency in day loose stools in the morning (2) and in the evening (1) (not in the afternoon)  Bowel frequency in night gas +/- stool  Urgency of defecation YES occasionally, mild   Blood in stool 50%; alternatives between the toilet and only with wiping   General well being 2 = poor  Extracolonic features (multiple select) none     Interval history, 1/2021 (video visit)  Off prednisone x 3 days. Continues on budesonide foam x .   Has been experiencing constipation and a mildly painful hemorrhoid.  Last Stelara injection was 1/20/2021    Current UC symptoms  Bowel frequency in day 3 (except 5/day with menstruation)  Bowel frequency in night none  Urgency of defecation No  Blood in stool none    General well being 0 = very well  Extracolonic features (multiple select) none      Interval history, 6/2022 (video visit)  Currently 10 w pregnant and feeling well. No morning sickness. Some fatigue, improved after week #8. FC on 6/6 was 173. Had some blood in stool around 6/17 which resolved with budesonide " foam.   Feeling well from a GI standpoint.     Remicade going well. No breakthrough or waning symptoms. In 10/2021, trough level was 14.     Switching ob specialists to [facility] in [place].   Plans to travel to [facility] at around 22 weeks gestation (Sep 2-12)    Current UC symptoms  Bowel frequency in day 2-3   Bowel frequency in night none  Urgency of defecation No  Blood in stool none    General well being 0 = very well  Extracolonic features (multiple select) none       Interval history, 12/2022 (video visit)  Currently 35w4d pregnant.   Continues on Remicade every 8 weeks monotherapy. Last infusion was on 12/7. Next infusion 2/1. Will be induced at 39 weeks on 1/20.     Doing well from a mental health standpoint; continues on sertraline (managed by PCP). Not seeing a therapist currently.      Current UC symptoms  Bowel frequency in day 2-3   Bowel frequency in night none  Urgency of defecation No  Blood in stool none    General well being 0 = very well  Extracolonic features (multiple select) none     Past Medical History:   Diagnosis Date     Acne      Hypothyroidism      Ulcerative colitis (H)      Ulcerative colitis (H) 10/28/2020        Past Surgical History:   Procedure Laterality Date     COLONOSCOPY N/A 5/11/2021    Procedure: COLONOSCOPY, WITH POLYPECTOMY AND BIOPSY;  Surgeon: Poonam Dunbar MD;  [place] [facility]     EYE SURGERY       ORTHOPEDIC SURGERY      right ankle.       Social History     Tobacco Use     Smoking status: Never     Smokeless tobacco: Never   Substance Use Topics     Alcohol use: Not on file       Family History   Problem Relation Age of Onset     Colitis Maternal Grandfather      Ulcerative Colitis Maternal Grandfather      Crohn's Disease No family hx of      GERD No family hx of      Stomach Cancer No family hx of        No Known Allergies     Outpatient Encounter Medications as of 12/28/2022   Medication Sig Dispense Refill     citrulline (L-CITRULLINE) 600 MG Take 600 mg by mouth  3 times daily       dhea 25 MG TABS Take 25 mg by mouth daily       fish oil-omega-3 fatty acids 1000 MG capsule Take 2 g by mouth daily       inFLIXimab (REMICADE IV)        levothyroxine (SYNTHROID/LEVOTHROID) 50 MCG tablet Take 50 mcg by mouth       sertraline (ZOLOFT) 50 MG tablet Take 50 mg by mouth daily       Turmeric 450 MG CAPS        Vitamin D3 (CHOLECALCIFEROL) 125 MCG (5000 UT) tablet Take 1 tablet by mouth every other day       budesonide (UCERIS) 9 MG 24 hr tablet Take 1 tablet (9 mg) by mouth every morning (Patient not taking: Reported on 12/28/2022) 30 tablet 0     calcium carbonate (OS-KATIA) 1500 (600 Ca) MG tablet Take 600 mg by mouth daily  (Patient not taking: Reported on 8/30/2022)       cannabidiol (EPIDIOLEX) 100 MG/ML oral solution Take 30 mg by mouth (Patient not taking: Reported on 6/29/2022)       magnesium 250 MG tablet Take 1 tablet by mouth daily (Patient not taking: Reported on 12/28/2022)       multivitamin w/minerals (THERA-VIT-M) tablet Take 1 tablet by mouth 2 times daily  (Patient not taking: Reported on 6/29/2022)       spironolactone (ALDACTONE) 50 MG tablet Take 50 mg by mouth (Patient not taking: Reported on 6/29/2022)       No facility-administered encounter medications on file as of 12/28/2022.      NSAID  none    Review of Systems  Complete 10 System ROS performed. All are negative except as documented below, in the HPI, or in patient questionnaire from today's visit.    1) Constitutional: No fevers, chills, night sweats or malaise, weight loss or gain  2) Skin: No rash  3) Pulmonary: No wheeze, SOB, cough, sputum or hemoptysis  4) Cardiovascular: No Chest pain or palpitations  5) Genitourinary: No blood in urine or dysuria  6) Endocrine: No increased sweating, hunger, thirst or thyroid problems  7) Hematologic: No bruising and easy bleeding  8) Musculoskeletal: no new pain in joints or limitation in ROM  9) Neurologic: No dizziness, paresthesias or weakness or falls  10)  Psychiatric:  not depressed/anxious, no sleep problems    PHYSICAL EXAM  Vitals: LMP 04/20/2022     No Pain (0)       General appearance  Healthy appearing adult, in no acute distress     Eyes  Sclera anicteric  Pupils round and reactive to light     Ears, nose, mouth and throat  No obvious external lesions of ears and nose  Hearing intact     Neck  Symmetric  No obvious external lesions     Respiratory  Normal respiration, no use of accessory muscles      MSK  Gait normal     Skin  No rashes or jaundice      Psychiatric  Oriented to person, place and time  Appropriate mood and affect.     DATA:  Reviewed in detail past documentation, medications and prior workup available in electronic health records or through outside records.    PERTINENT STUDIES:  Fecal calprotectin 6/2022 173     Most recent CBC:  WBC   Date Value Ref Range Status   11/24/2020 14.6 (H) 4.0 - 11.0 10e9/L Final     WBC Count   Date Value Ref Range Status   12/07/2022 13.3 (H) 4.0 - 11.0 10e3/uL Final   ]  Hemoglobin   Date Value Ref Range Status   12/07/2022 13.4 11.7 - 15.7 g/dL Final   11/24/2020 11.2 (L) 11.7 - 15.7 g/dL Final   ]   Platelet Count   Date Value Ref Range Status   12/07/2022 324 150 - 450 10e3/uL Final   11/24/2020 408 150 - 450 10e9/L Final     Most recent hepatic panel:  AST   Date Value Ref Range Status   12/07/2022 19 0 - 45 U/L Final   11/24/2020 6 0 - 45 U/L Final     ALT   Date Value Ref Range Status   12/07/2022 28 0 - 50 U/L Final   11/24/2020 14 0 - 50 U/L Final     No results found for: BILICONJ   Bilirubin Total   Date Value Ref Range Status   12/07/2022 0.3 0.2 - 1.3 mg/dL Final   11/24/2020 0.2 0.2 - 1.3 mg/dL Final     Albumin   Date Value Ref Range Status   12/07/2022 2.6 (L) 3.4 - 5.0 g/dL Final   11/24/2020 2.9 (L) 3.4 - 5.0 g/dL Final     Alkaline Phosphatase   Date Value Ref Range Status   12/07/2022 77 40 - 150 U/L Final   11/24/2020 51 40 - 150 U/L Final       Most recent creatinine:  Creatinine   Date  Value Ref Range Status   12/07/2022 0.45 (L) 0.52 - 1.04 mg/dL Final   05/11/2021 0.68 0.52 - 1.04 mg/dL Final       Endoscopy:     Flex sig 9/2020 showed Tolliver 3 colitis from rectum to descending colon    icscope 9/2019:  Impression:   - The examined portion of the ileum                        was normal.                       - One 10 to 11 mm polyp in the                        sigmoid colon, removed with a hot                        snare. Resected and retrieved.                       - Pancolitis. Inflammation was found                        from the anus to the cecum. This was                        mild in severity and graded as Tolliver                        Score 1 (mild disease). Biopsied.      icscope 8/2017 showed E3 colitis (severity unclear): Congested, erythematous and eroded                        mucosa in the entire examined colon.                        Biopsied from a) right colon and b)                        left colon. Findings consistent with                        ulcerative pan colitis.    Specimens:   A) - Colon, , Right colon bx's                                                                      B) - Colon, , Left colon bx's                                                                       C) - Colon, sigmoid                                                                        FINAL DIAGNOSIS A.  Colon, right, biopsy -- Colonic mucosal fragments with minimal crypt architectural distortion and minimal, focal activity    B.  Colon, left, biopsy -- Colonic mucosal fragments with minimal crypt architectural distortion and minimal, focal activity    C.  Colon, sigmoid, polypectomy -- Inflammatory polyp       IMPRESSION:    Ms. Caputo is a 27 year old here with pan UC on Remicade 5 mg/kg q8w monotherapy doing well. She is currently 35w4d pregnant. She is in clinical remission.    We will proceed with the following:    DIAGNOSIS:  # Pregnant, T3  # Pan UC, on Remicade 5 mg/kg q8 w       PLAN:  --Continue Remicade at current schedule  ------Labs with every infusion  --Check fecal davian now and post partum  --Continue increased fetal monitoring   --Agree with induction of delivery at 39 weeks  --Xochitl will reach out to myself and/or her PCP if she experiences mental health challenges postpartum  --Avoid rotavirus vaccine in baby  --OK to breastfeed while on Remicade   --Cscope for disease activity assessment when able postpartum about 6-12 months after delivery (will order during next visit)     Misc:  -- Avoid tobacco use  -- Avoid NSAIDs as there is potentially a 25% chance of causing an IBD flare    Return in about 6 months (around 6/28/2023).    40 minutes spent on the date of the encounter performing chart review, history and exam, documentation and further activities as noted above.     Poonam Dunbar MD   of Medicine  Division of Gastroenterology, Hepatology and Nutrition  [facility]    Answers for HPI/ROS submitted by the patient on 12/28/2022  General Symptoms: No  Skin Symptoms: No  HENT Symptoms: No  EYE SYMPTOMS: No  HEART SYMPTOMS: No  LUNG SYMPTOMS: No  INTESTINAL SYMPTOMS: No  URINARY SYMPTOMS: No  GYNECOLOGIC SYMPTOMS: No  BREAST SYMPTOMS: No  SKELETAL SYMPTOMS: No  BLOOD SYMPTOMS: No  NERVOUS SYSTEM SYMPTOMS: No  MENTAL HEALTH SYMPTOMS: No

## 2022-12-28 NOTE — PATIENT INSTRUCTIONS
PLAN  --Continue Remicade at current schedule  ------Labs with every infusion  --Check fecal davian now and post partum  --Continue increased fetal monitoring   --Agree with induction of delivery at 39 weeks  --Xochitl will reach out to myself and/or her PCP if she experiences mental health challenges postpartum  --Avoid rotavirus vaccine in baby  --OK to breastfeed while on Remicade   --Cscope for disease activity assessment when able postpartum about 6-12 months after delivery (will order during next visit)

## 2022-12-29 ENCOUNTER — TELEPHONE (OUTPATIENT)
Dept: GASTROENTEROLOGY | Facility: CLINIC | Age: 27
End: 2022-12-29

## 2022-12-29 ENCOUNTER — LAB (OUTPATIENT)
Dept: LAB | Facility: CLINIC | Age: 27
End: 2022-12-29
Payer: COMMERCIAL

## 2022-12-29 DIAGNOSIS — K51.00 ULCERATIVE PANCOLITIS WITHOUT COMPLICATION (H): ICD-10-CM

## 2022-12-29 PROCEDURE — 83993 ASSAY FOR CALPROTECTIN FECAL: CPT

## 2022-12-29 NOTE — TELEPHONE ENCOUNTER
SCOUT and sent [employer] for scheduling return in about 6 months (around 6/28/2023) with Dr. Dunbar.

## 2022-12-30 NOTE — PROGRESS NOTES
This is a recent snapshot of the patient's [facility] medical record.  For current drug dose and complete information and questions, call [phone] or In Basket pool, fv home infusion ([id number])  CSN Number:  [id number]

## 2023-01-02 LAB — CALPROTECTIN STL-MCNT: 19.4 MG/KG (ref 0–49.9)

## 2023-01-03 ENCOUNTER — TELEPHONE (OUTPATIENT)
Dept: GASTROENTEROLOGY | Facility: CLINIC | Age: 28
End: 2023-01-03

## 2023-01-30 ENCOUNTER — PATIENT OUTREACH (OUTPATIENT)
Dept: GASTROENTEROLOGY | Facility: CLINIC | Age: 28
End: 2023-01-30
Payer: COMMERCIAL

## 2023-01-30 NOTE — TELEPHONE ENCOUNTER
Received call from [facility] that the patient believes she may have mastitis. Recently had baby on 1/20/23. The patient is going in today to potentially get an antibiotic. I reports the patient has not had any fevers, and would still like to receive her infusion on 2/1/23. Writer ok'd infusion, pending no fevers occur. Routed to Marielena BALLARD for update.

## 2023-02-08 ENCOUNTER — DOCUMENTATION ONLY (OUTPATIENT)
Dept: PHARMACY | Facility: CLINIC | Age: 28
End: 2023-02-08

## 2023-02-08 ENCOUNTER — LAB REQUISITION (OUTPATIENT)
Dept: LAB | Facility: CLINIC | Age: 28
End: 2023-02-08
Payer: COMMERCIAL

## 2023-02-08 DIAGNOSIS — K51.90 ULCERATIVE COLITIS, UNSPECIFIED, WITHOUT COMPLICATIONS (H): ICD-10-CM

## 2023-02-08 LAB
ALBUMIN SERPL BCG-MCNC: 3.1 G/DL (ref 3.5–5.2)
ALP SERPL-CCNC: 69 U/L (ref 35–104)
ALT SERPL W P-5'-P-CCNC: 17 U/L (ref 10–35)
ANION GAP SERPL CALCULATED.3IONS-SCNC: 6 MMOL/L (ref 7–15)
AST SERPL W P-5'-P-CCNC: 20 U/L (ref 10–35)
BASOPHILS # BLD AUTO: 0.1 10E3/UL (ref 0–0.2)
BASOPHILS NFR BLD AUTO: 1 %
BILIRUB DIRECT SERPL-MCNC: <0.2 MG/DL (ref 0–0.3)
BILIRUB SERPL-MCNC: 0.3 MG/DL
BUN SERPL-MCNC: 9.2 MG/DL (ref 6–20)
CALCIUM SERPL-MCNC: 8.1 MG/DL (ref 8.6–10)
CHLORIDE SERPL-SCNC: 103 MMOL/L (ref 98–107)
CREAT SERPL-MCNC: 0.6 MG/DL (ref 0.51–0.95)
CRP SERPL-MCNC: 7.84 MG/L
DEPRECATED HCO3 PLAS-SCNC: 27 MMOL/L (ref 22–29)
EOSINOPHIL # BLD AUTO: 0.6 10E3/UL (ref 0–0.7)
EOSINOPHIL NFR BLD AUTO: 7 %
ERYTHROCYTE [DISTWIDTH] IN BLOOD BY AUTOMATED COUNT: 13.8 % (ref 10–15)
ERYTHROCYTE [SEDIMENTATION RATE] IN BLOOD BY WESTERGREN METHOD: 41 MM/HR (ref 0–20)
GFR SERPL CREATININE-BSD FRML MDRD: >90 ML/MIN/1.73M2
GLUCOSE SERPL-MCNC: 89 MG/DL (ref 70–99)
HCT VFR BLD AUTO: 39.2 % (ref 35–47)
HGB BLD-MCNC: 13 G/DL (ref 11.7–15.7)
IMM GRANULOCYTES # BLD: 0 10E3/UL
IMM GRANULOCYTES NFR BLD: 0 %
LYMPHOCYTES # BLD AUTO: 3.7 10E3/UL (ref 0.8–5.3)
LYMPHOCYTES NFR BLD AUTO: 45 %
MCH RBC QN AUTO: 31.9 PG (ref 26.5–33)
MCHC RBC AUTO-ENTMCNC: 33.2 G/DL (ref 31.5–36.5)
MCV RBC AUTO: 96 FL (ref 78–100)
MONOCYTES # BLD AUTO: 0.7 10E3/UL (ref 0–1.3)
MONOCYTES NFR BLD AUTO: 8 %
NEUTROPHILS # BLD AUTO: 3.3 10E3/UL (ref 1.6–8.3)
NEUTROPHILS NFR BLD AUTO: 39 %
NRBC # BLD AUTO: 0 10E3/UL
NRBC BLD AUTO-RTO: 0 /100
PLATELET # BLD AUTO: 638 10E3/UL (ref 150–450)
POTASSIUM SERPL-SCNC: 3.6 MMOL/L (ref 3.4–5.3)
PROT SERPL-MCNC: 6.3 G/DL (ref 6.4–8.3)
RBC # BLD AUTO: 4.08 10E6/UL (ref 3.8–5.2)
SODIUM SERPL-SCNC: 136 MMOL/L (ref 136–145)
WBC # BLD AUTO: 8.4 10E3/UL (ref 4–11)

## 2023-02-08 PROCEDURE — 86140 C-REACTIVE PROTEIN: CPT | Performed by: INTERNAL MEDICINE

## 2023-02-08 PROCEDURE — 85652 RBC SED RATE AUTOMATED: CPT | Performed by: INTERNAL MEDICINE

## 2023-02-08 PROCEDURE — 85025 COMPLETE CBC W/AUTO DIFF WBC: CPT | Performed by: INTERNAL MEDICINE

## 2023-02-08 PROCEDURE — 82310 ASSAY OF CALCIUM: CPT | Performed by: INTERNAL MEDICINE

## 2023-02-08 PROCEDURE — 82248 BILIRUBIN DIRECT: CPT | Performed by: INTERNAL MEDICINE

## 2023-02-08 NOTE — PROGRESS NOTES
Skilled Nurse visit in the Patient Home to administer Remicade (infliximab) 400 mg. No recent elevated temperature, fever, chills, productive cough, coughing for 3 weeks or longer or hemoptysis, abnormal vital signs, night sweats, chest pain. No decrease in your appetite, unexplained weight loss or fatigue. No other new onset medical symptoms. Current weight 165lb. Peripheral IV right AC, 1 attempt. Labs drawn hepatic panel, CBCD, CRPI, ESR, BMP. Infusion completed without complication or reaction. Pt reports therapy is effective in managing symptoms related to therapy.

## 2023-04-05 ENCOUNTER — DOCUMENTATION ONLY (OUTPATIENT)
Dept: PHARMACY | Facility: CLINIC | Age: 28
End: 2023-04-05
Payer: COMMERCIAL

## 2023-04-05 ENCOUNTER — TELEPHONE (OUTPATIENT)
Dept: GASTROENTEROLOGY | Facility: CLINIC | Age: 28
End: 2023-04-05
Payer: COMMERCIAL

## 2023-04-05 NOTE — TELEPHONE ENCOUNTER
[facility] Call Center    Phone Message    May a detailed message be left on voicemail: no     Reason for Call: Other: Anabela from [facility] [phone] called, she is looking for orders for a rapid remicaid, pt is interested in 1 hr remicaid infusion if possible.  Fax to [phone]    Action Taken: Other: [facility] gi    Travel Screening: Not Applicable

## 2023-04-06 NOTE — TELEPHONE ENCOUNTER
"Notified [facility] that orders for rapid infusion can be found in the therapy plan under \"Nursing Communication #3\". No new orders needed.  "

## 2023-05-04 NOTE — PROGRESS NOTES
This is a recent snapshot of the patient's [facility] medical record.  For current drug dose and complete information and questions, call [phone] or In Basket pool, fv home infusion ([id number])  CSN Number:  [id number]

## 2023-05-31 ENCOUNTER — LAB REQUISITION (OUTPATIENT)
Dept: LAB | Facility: CLINIC | Age: 28
End: 2023-05-31
Payer: COMMERCIAL

## 2023-05-31 ENCOUNTER — DOCUMENTATION ONLY (OUTPATIENT)
Dept: PHARMACY | Facility: CLINIC | Age: 28
End: 2023-05-31

## 2023-05-31 DIAGNOSIS — K51.90 ULCERATIVE COLITIS, UNSPECIFIED, WITHOUT COMPLICATIONS (H): ICD-10-CM

## 2023-05-31 LAB
ALBUMIN SERPL BCG-MCNC: 4.1 G/DL (ref 3.5–5.2)
ALP SERPL-CCNC: 63 U/L (ref 35–104)
ALT SERPL W P-5'-P-CCNC: 24 U/L (ref 10–35)
ANION GAP SERPL CALCULATED.3IONS-SCNC: 10 MMOL/L (ref 7–15)
AST SERPL W P-5'-P-CCNC: 18 U/L (ref 10–35)
BASOPHILS # BLD AUTO: 0.1 10E3/UL (ref 0–0.2)
BASOPHILS NFR BLD AUTO: 1 %
BILIRUB DIRECT SERPL-MCNC: <0.2 MG/DL (ref 0–0.3)
BILIRUB SERPL-MCNC: 0.4 MG/DL
BUN SERPL-MCNC: 8 MG/DL (ref 6–20)
CALCIUM SERPL-MCNC: 9.3 MG/DL (ref 8.6–10)
CHLORIDE SERPL-SCNC: 103 MMOL/L (ref 98–107)
CREAT SERPL-MCNC: 0.6 MG/DL (ref 0.51–0.95)
CRP SERPL-MCNC: 3.31 MG/L
DEPRECATED HCO3 PLAS-SCNC: 25 MMOL/L (ref 22–29)
EOSINOPHIL # BLD AUTO: 0.1 10E3/UL (ref 0–0.7)
EOSINOPHIL NFR BLD AUTO: 2 %
ERYTHROCYTE [DISTWIDTH] IN BLOOD BY AUTOMATED COUNT: 14.3 % (ref 10–15)
ERYTHROCYTE [SEDIMENTATION RATE] IN BLOOD BY WESTERGREN METHOD: 5 MM/HR (ref 0–20)
GFR SERPL CREATININE-BSD FRML MDRD: >90 ML/MIN/1.73M2
GLUCOSE SERPL-MCNC: 102 MG/DL (ref 70–99)
HCT VFR BLD AUTO: 43.3 % (ref 35–47)
HGB BLD-MCNC: 14.3 G/DL (ref 11.7–15.7)
HOLD SPECIMEN: NORMAL
IMM GRANULOCYTES # BLD: 0 10E3/UL
IMM GRANULOCYTES NFR BLD: 0 %
LYMPHOCYTES # BLD AUTO: 3.2 10E3/UL (ref 0.8–5.3)
LYMPHOCYTES NFR BLD AUTO: 40 %
MCH RBC QN AUTO: 29.8 PG (ref 26.5–33)
MCHC RBC AUTO-ENTMCNC: 33 G/DL (ref 31.5–36.5)
MCV RBC AUTO: 90 FL (ref 78–100)
MONOCYTES # BLD AUTO: 0.5 10E3/UL (ref 0–1.3)
MONOCYTES NFR BLD AUTO: 6 %
NEUTROPHILS # BLD AUTO: 4.2 10E3/UL (ref 1.6–8.3)
NEUTROPHILS NFR BLD AUTO: 51 %
NRBC # BLD AUTO: 0 10E3/UL
NRBC BLD AUTO-RTO: 0 /100
PLATELET # BLD AUTO: 336 10E3/UL (ref 150–450)
POTASSIUM SERPL-SCNC: 3.6 MMOL/L (ref 3.4–5.3)
PROT SERPL-MCNC: 7.4 G/DL (ref 6.4–8.3)
RBC # BLD AUTO: 4.8 10E6/UL (ref 3.8–5.2)
SODIUM SERPL-SCNC: 138 MMOL/L (ref 136–145)
WBC # BLD AUTO: 8 10E3/UL (ref 4–11)

## 2023-05-31 PROCEDURE — 85004 AUTOMATED DIFF WBC COUNT: CPT | Performed by: INTERNAL MEDICINE

## 2023-05-31 PROCEDURE — 82248 BILIRUBIN DIRECT: CPT | Performed by: INTERNAL MEDICINE

## 2023-05-31 PROCEDURE — 86140 C-REACTIVE PROTEIN: CPT | Performed by: INTERNAL MEDICINE

## 2023-05-31 PROCEDURE — 85652 RBC SED RATE AUTOMATED: CPT | Performed by: INTERNAL MEDICINE

## 2023-06-04 NOTE — PROGRESS NOTES
Skilled Nurse visit in the Patient Home to administer Remicade (infliximab) 400 mg. No recent elevated temperature, fever, chills, productive cough, coughing for 3 weeks or longer or hemoptysis, abnormal vital signs, night sweats, chest pain. No decrease in your appetite, unexplained weight loss or fatigue. No other new onset medical symptoms. Current weight 170lb.  Peripheral IV left AC, 1 attempt. Labs drawn CBCD, ESR, CRPI, BMP, Hepatic panel. Infusion completed without complication or reaction. Pt reports therapy is effective in managing symptoms related to therapy.

## 2023-08-03 ENCOUNTER — MYC MEDICAL ADVICE (OUTPATIENT)
Dept: GASTROENTEROLOGY | Facility: CLINIC | Age: 28
End: 2023-08-03
Payer: COMMERCIAL

## 2023-08-03 DIAGNOSIS — K51.00 ULCERATIVE PANCOLITIS WITHOUT COMPLICATION (H): Primary | ICD-10-CM

## 2023-08-03 RX ORDER — EPINEPHRINE 1 MG/ML
0.3 INJECTION, SOLUTION, CONCENTRATE INTRAVENOUS EVERY 5 MIN PRN
Status: CANCELLED | OUTPATIENT
Start: 2023-08-03

## 2023-08-03 RX ORDER — DIPHENHYDRAMINE HYDROCHLORIDE 50 MG/ML
50 INJECTION INTRAMUSCULAR; INTRAVENOUS
Status: CANCELLED
Start: 2023-08-03

## 2023-08-03 RX ORDER — ALBUTEROL SULFATE 0.83 MG/ML
2.5 SOLUTION RESPIRATORY (INHALATION)
Status: CANCELLED | OUTPATIENT
Start: 2023-08-03

## 2023-08-03 RX ORDER — ALBUTEROL SULFATE 90 UG/1
1-2 AEROSOL, METERED RESPIRATORY (INHALATION)
Status: CANCELLED
Start: 2023-08-03

## 2023-08-03 RX ORDER — METHYLPREDNISOLONE SODIUM SUCCINATE 125 MG/2ML
32 INJECTION, POWDER, LYOPHILIZED, FOR SOLUTION INTRAMUSCULAR; INTRAVENOUS ONCE
Status: CANCELLED | OUTPATIENT
Start: 2023-08-03 | End: 2023-08-03

## 2023-08-03 RX ORDER — MEPERIDINE HYDROCHLORIDE 25 MG/ML
25 INJECTION INTRAMUSCULAR; INTRAVENOUS; SUBCUTANEOUS EVERY 30 MIN PRN
Status: CANCELLED | OUTPATIENT
Start: 2023-08-03

## 2023-08-03 RX ORDER — METHYLPREDNISOLONE SODIUM SUCCINATE 125 MG/2ML
125 INJECTION, POWDER, LYOPHILIZED, FOR SOLUTION INTRAMUSCULAR; INTRAVENOUS
Status: CANCELLED
Start: 2023-08-03

## 2023-08-03 RX ORDER — ACETAMINOPHEN 325 MG/1
650 TABLET ORAL ONCE
Status: CANCELLED
Start: 2023-08-03 | End: 2023-08-03

## 2023-08-03 RX ORDER — DIPHENHYDRAMINE HCL 25 MG
25 CAPSULE ORAL ONCE
Status: CANCELLED
Start: 2023-08-03 | End: 2023-08-03

## 2023-08-03 NOTE — TELEPHONE ENCOUNTER
Remicade therapy plan orders ; plan has been updated. Patient remains on the same medication regimen. Standing lab orders placed, including BMP. Hepatitis B serologies from  do not indicate immunity. Quant Gold TB test due; order placed. Next office visit is due to be scheduled.

## 2023-10-04 ENCOUNTER — MYC MEDICAL ADVICE (OUTPATIENT)
Dept: GASTROENTEROLOGY | Facility: CLINIC | Age: 28
End: 2023-10-04

## 2023-10-04 ENCOUNTER — DOCUMENTATION ONLY (OUTPATIENT)
Dept: PHARMACY | Facility: CLINIC | Age: 28
End: 2023-10-04

## 2023-10-04 ENCOUNTER — LAB REQUISITION (OUTPATIENT)
Dept: LAB | Facility: CLINIC | Age: 28
End: 2023-10-04
Payer: COMMERCIAL

## 2023-10-04 DIAGNOSIS — K51.00 ULCERATIVE PANCOLITIS WITHOUT COMPLICATION (H): Primary | ICD-10-CM

## 2023-10-04 DIAGNOSIS — K51.90 ULCERATIVE COLITIS, UNSPECIFIED, WITHOUT COMPLICATIONS (H): ICD-10-CM

## 2023-10-04 LAB
ALBUMIN SERPL BCG-MCNC: 4.4 G/DL (ref 3.5–5.2)
ALP SERPL-CCNC: 61 U/L (ref 35–104)
ALT SERPL W P-5'-P-CCNC: 18 U/L (ref 0–50)
ANION GAP SERPL CALCULATED.3IONS-SCNC: 12 MMOL/L (ref 7–15)
AST SERPL W P-5'-P-CCNC: 17 U/L (ref 0–45)
BASO+EOS+MONOS # BLD AUTO: NORMAL 10*3/UL
BASO+EOS+MONOS NFR BLD AUTO: NORMAL %
BASOPHILS # BLD AUTO: 0.1 10E3/UL (ref 0–0.2)
BASOPHILS NFR BLD AUTO: 1 %
BILIRUB DIRECT SERPL-MCNC: <0.2 MG/DL (ref 0–0.3)
BILIRUB SERPL-MCNC: 0.4 MG/DL
BUN SERPL-MCNC: 6.9 MG/DL (ref 6–20)
CALCIUM SERPL-MCNC: 9.3 MG/DL (ref 8.6–10)
CHLORIDE SERPL-SCNC: 104 MMOL/L (ref 98–107)
CREAT SERPL-MCNC: 0.75 MG/DL (ref 0.51–0.95)
CRP SERPL-MCNC: <3 MG/L
DEPRECATED HCO3 PLAS-SCNC: 25 MMOL/L (ref 22–29)
EGFRCR SERPLBLD CKD-EPI 2021: >90 ML/MIN/1.73M2
EOSINOPHIL # BLD AUTO: 0.1 10E3/UL (ref 0–0.7)
EOSINOPHIL NFR BLD AUTO: 2 %
ERYTHROCYTE [DISTWIDTH] IN BLOOD BY AUTOMATED COUNT: 13.6 % (ref 10–15)
ERYTHROCYTE [SEDIMENTATION RATE] IN BLOOD BY WESTERGREN METHOD: 2 MM/HR (ref 0–20)
GLUCOSE SERPL-MCNC: 47 MG/DL (ref 70–99)
HCT VFR BLD AUTO: 45.6 % (ref 35–47)
HGB BLD-MCNC: 14.9 G/DL (ref 11.7–15.7)
IMM GRANULOCYTES # BLD: 0 10E3/UL
IMM GRANULOCYTES NFR BLD: 0 %
LYMPHOCYTES # BLD AUTO: 2.7 10E3/UL (ref 0.8–5.3)
LYMPHOCYTES NFR BLD AUTO: 34 %
MCH RBC QN AUTO: 30.3 PG (ref 26.5–33)
MCHC RBC AUTO-ENTMCNC: 32.7 G/DL (ref 31.5–36.5)
MCV RBC AUTO: 93 FL (ref 78–100)
MONOCYTES # BLD AUTO: 0.6 10E3/UL (ref 0–1.3)
MONOCYTES NFR BLD AUTO: 8 %
NEUTROPHILS # BLD AUTO: 4.5 10E3/UL (ref 1.6–8.3)
NEUTROPHILS NFR BLD AUTO: 55 %
NRBC # BLD AUTO: 0 10E3/UL
NRBC BLD AUTO-RTO: 0 /100
PLATELET # BLD AUTO: 343 10E3/UL (ref 150–450)
POTASSIUM SERPL-SCNC: 3.9 MMOL/L (ref 3.4–5.3)
PROT SERPL-MCNC: 7.8 G/DL (ref 6.4–8.3)
RBC # BLD AUTO: 4.91 10E6/UL (ref 3.8–5.2)
SODIUM SERPL-SCNC: 141 MMOL/L (ref 135–145)
WBC # BLD AUTO: 8.1 10E3/UL (ref 4–11)

## 2023-10-04 PROCEDURE — 82248 BILIRUBIN DIRECT: CPT | Performed by: INTERNAL MEDICINE

## 2023-10-04 PROCEDURE — 85652 RBC SED RATE AUTOMATED: CPT | Performed by: INTERNAL MEDICINE

## 2023-10-04 PROCEDURE — 86140 C-REACTIVE PROTEIN: CPT | Performed by: INTERNAL MEDICINE

## 2023-10-04 PROCEDURE — 85004 AUTOMATED DIFF WBC COUNT: CPT | Performed by: INTERNAL MEDICINE

## 2023-10-09 ENCOUNTER — TELEPHONE (OUTPATIENT)
Dept: GASTROENTEROLOGY | Facility: CLINIC | Age: 28
End: 2023-10-09
Payer: COMMERCIAL

## 2023-10-09 NOTE — TELEPHONE ENCOUNTER
I called Xochitl to discuss her question re: stopping Remicade. We discussed the potential risks and benefits of stopping vs. continuing Remicade. We discussed risk of cancer, infections and risks of disease recurrence and associated complications. I also made it clear that I recommend continuing Remicade in order to decrease the risk of disease recurrence. Additionally, her remaining options for maintenance therapy are very low, especially if she wants to have more children. Xochitl agreed to continue the Remicade and we can re-assess in 12 months or so.     PLAN  --continue Remicade.   --check trough level prior to next dose (due end of Nov)  --cscope (cs or mac) for disease activity assessment

## 2023-10-09 NOTE — TELEPHONE ENCOUNTER
Dr. Dunbar spoke with patient on the phone. Please see telephone encounter from 10/9/23.      Order placed for IFX level to be drawn 12/6/23 with [facility]; notified Pau HUSTON RN at [facility]. This level will be a 9 week trough; Dr. Dunbar is aware.  Order placed for colonoscopy.

## 2023-10-11 ENCOUNTER — DOCUMENTATION ONLY (OUTPATIENT)
Dept: GASTROENTEROLOGY | Facility: CLINIC | Age: 28
End: 2023-10-11
Payer: COMMERCIAL

## 2023-10-11 NOTE — PROGRESS NOTES
Skilled Nurse visit in the Patient Home to administer Remicade (infliximab) 400 mg. No recent elevated temperature, fever, chills, productive cough, coughing for 3 weeks or longer or hemoptysis, abnormal vital signs, night sweats, chest pain. No decrease in your appetite, unexplained weight loss or fatigue. No other new onset medical symptoms.  Current weight 155lb.  Peripheral IV left AC, 1 attempt. Labs drawn hepatic panel, ESR, CRPI, CBCD, BMP. Infusion completed without complication or reaction. Pt reports therapy is effective in managing symptoms related to therapy.

## 2023-10-12 ENCOUNTER — HOSPITAL ENCOUNTER (OUTPATIENT)
Facility: AMBULATORY SURGERY CENTER | Age: 28
End: 2023-10-12
Attending: INTERNAL MEDICINE
Payer: COMMERCIAL

## 2023-10-12 ENCOUNTER — TELEPHONE (OUTPATIENT)
Dept: GASTROENTEROLOGY | Facility: CLINIC | Age: 28
End: 2023-10-12
Payer: COMMERCIAL

## 2023-10-12 NOTE — TELEPHONE ENCOUNTER
"Endoscopy Scheduling Screen    Have you had a positive Covid test in the last 14 days?  No    Are you active on MyChart?   Yes    What insurance is in the chart?  Other:  [facility]    Ordering/Referring Provider: CHELSEY HUDDLESTON    (If ordering provider performs procedure, schedule with ordering provider unless otherwise instructed. )    BMI: Estimated body mass index is 25.06 kg/m  as calculated from the following:    Height as of 8/17/21: 1.702 m (5' 7\").    Weight as of 6/29/22: 72.6 kg (160 lb).     Sedation Ordered  moderate sedation.   If patient BMI > 50 do not schedule in ASC.    If patient BMI > 45 do not schedule at [facility].    Are you taking methadone or Suboxone?  No    Are you taking any prescription medications for pain 3 or more times per week?   No    Do you have a history of malignant hyperthermia or adverse reaction to anesthesia?  No    (Females) Are you currently pregnant?   No     Have you been diagnosed or told you have pulmonary hypertension?   No    Do you have an LVAD?  No    Have you been told you have moderate to severe sleep apnea?  No    Have you been told you have COPD, asthma, or any other lung disease?  No    Do you have any heart conditions?  No     Have you ever had an organ transplant?   No    Have you ever had or are you awaiting a heart or lung transplant?   No    Have you had a stroke or transient ischemic attack (TIA aka \"mini stroke\" in the last 6 months?   No    Have you been diagnosed with or been told you have cirrhosis of the liver?   No    Are you currently on dialysis?   No    Do you need assistance transferring?   No    BMI: Estimated body mass index is 25.06 kg/m  as calculated from the following:    Height as of 8/17/21: 1.702 m (5' 7\").    Weight as of 6/29/22: 72.6 kg (160 lb).     Is patients BMI > 40 and scheduling location [facility]?  No    Do you take an injectable medication for weight loss or diabetes (excluding insulin)?  No    Do you take the medication " Naltrexone?  No    Do you take blood thinners?  No       Prep   Are you currently on dialysis or do you have chronic kidney disease?  No    Do you have a diagnosis of diabetes?  No    Do you have a diagnosis of cystic fibrosis (CF)?  No    On a regular basis do you go 3 -5 days between bowel movements?  No    BMI > 40?  No    Preferred Pharmacy:    [id number] - [address] [address]  Phone: [phone] Fax: [phone]      Final Scheduling Details   Colonoscopy prep sent?  Standard MiraLAX    Procedure scheduled  Colonoscopy    Surgeon:  FLAQUITO     Date of procedure:  1/22     Pre-OP / PAC:   No - Not required for this site.    Location  [facility] - ASC - Per order.    Sedation   Moderate Sedation - Per order.      Patient Reminders:   You will receive a call from a Nurse to review instructions and health history.  This assessment must be completed prior to your procedure.  Failure to complete the Nurse assessment may result in the procedure being cancelled.      On the day of your procedure, please designate an adult(s) who can drive you home stay with you for the next 24 hours. The medicines used in the exam will make you sleepy. You will not be able to drive.      You cannot take public transportation, ride share services, or non-medical taxi service without a responsible caregiver.  Medical transport services are allowed with the requirement that a responsible caregiver will receive you at your destination.  We require that drivers and caregivers are confirmed prior to your procedure.

## 2023-10-16 ENCOUNTER — MEDICAL CORRESPONDENCE (OUTPATIENT)
Dept: HEALTH INFORMATION MANAGEMENT | Facility: CLINIC | Age: 28
End: 2023-10-16
Payer: COMMERCIAL

## 2023-11-28 ENCOUNTER — TELEPHONE (OUTPATIENT)
Dept: GASTROENTEROLOGY | Facility: CLINIC | Age: 28
End: 2023-11-28
Payer: COMMERCIAL

## 2023-11-28 NOTE — TELEPHONE ENCOUNTER
----- Message from Becca Costa RN sent at 11/28/2023  8:53 AM CST -----  Regarding: FW: FYI - Delayed Infusion    ----- Message -----  From: Ean French [facility]  Sent: 11/27/2023  11:28 AM CST  To: Poonam Dunbar MD; Marielena Fernandez RN  Subject: FYI - Delayed Infusion                           Good morningXochitl is due for her next Remicade infusion on Wednesday, 11/29. Patient would like to push infusion back to next Wednesday, 12/6. Please contact [facility] if there are any concerns regarding her delayed therapy.    Thank you,    Ean French, PharmD  Clinical Pharmacist   [facility]   Office: [phone]   Fax: [phone]

## 2023-11-30 NOTE — TELEPHONE ENCOUNTER
"Ean French, [facility]  Becca Costa RN  \"Our nurse just spoke to Xochitl - patient reports being prescribed antibiotics 2 days ago (10 day course) and said she cannot have her infusion until next week.    Please call patient if more information is needed. Is the plan to still schedule her ASAP or keep the 12/6 appointment?    Thank you,  Ean\"        Contacted patient to discuss. Patient reports recently having a cyst develop on her breast, and having it drained. Was started on Cipro x 10 days.     Denies fevers, and reports the site appears to be improving since starting the antibiotic. Denies any pain.     Infusion scheduled a few days late on 12/6. Discussed keeping the infusion on 12/6 due to recent cyst.   "

## 2023-12-06 ENCOUNTER — DOCUMENTATION ONLY (OUTPATIENT)
Dept: PHARMACY | Facility: CLINIC | Age: 28
End: 2023-12-06

## 2023-12-06 ENCOUNTER — LAB REQUISITION (OUTPATIENT)
Dept: LAB | Facility: CLINIC | Age: 28
End: 2023-12-06
Payer: COMMERCIAL

## 2023-12-06 DIAGNOSIS — K51.90 ULCERATIVE COLITIS, UNSPECIFIED, WITHOUT COMPLICATIONS (H): ICD-10-CM

## 2023-12-06 PROCEDURE — 82542 COL CHROMOTOGRAPHY QUAL/QUAN: CPT | Performed by: INTERNAL MEDICINE

## 2023-12-07 NOTE — PROGRESS NOTES
Skilled Nurse visit in the Patient Home to administer Remicade (infliximab) 400 mg. No recent elevated temperature, fever, chills, productive cough, coughing for 3 weeks or longer or hemoptysis, abnormal vital signs, night sweats, chest pain. No decrease in your appetite, unexplained weight loss or fatigue. No other new onset medical symptoms. Current weight 160lb. Peripheral IV left AC, 1 attempt. Labs drawn Infliximab level. Infusion completed without complication or reaction. Pt reports therapy is effective in managing symptoms related to therapy.

## 2023-12-11 ENCOUNTER — TELEPHONE (OUTPATIENT)
Dept: GASTROENTEROLOGY | Facility: CLINIC | Age: 28
End: 2023-12-11
Payer: COMMERCIAL

## 2023-12-11 LAB
INFLIXIMAB AB SERPL IA-MCNC: 19.3 U/ML
INFLIXIMAB SERPL-MCNC: <1 UG/ML

## 2023-12-11 NOTE — TELEPHONE ENCOUNTER
Caller:   Reason for Reschedule/Cancellation (please be detailed, any staff messages or encounters to note?): NICOT OUT      Prior to reschedule please review:  Ordering Provider: CHELSEY HUDDLESTON   Sedation per order: MAC  Does patient have any ASC Exclusions, please identify?:       Notes on Cancelled Procedure:  Procedure: Lower Endoscopy [Colonoscopy]   Date: 1/22  Location: [facility]; [address] [place] [address], [place] [address]  Surgeon: FLAQUITO      Rescheduled: Yes  Procedure: Lower Endoscopy [Colonoscopy]   Date: 4/1  Location: [facility]; [address] [place] [address], [address] Floor, [place] [address]  Surgeon: FLAQUITO  Sedation Level Scheduled  CS,  Reason for Sedation Level OK PER ORDER  Prep/Instructions updated and sent: Y       Send In - basket message to Panc - Jose Pool if E[facility]  procedure is canceled or rescheduled: [ N/A, YES or NO]

## 2023-12-15 ENCOUNTER — TELEPHONE (OUTPATIENT)
Dept: GASTROENTEROLOGY | Facility: CLINIC | Age: 28
End: 2023-12-15
Payer: COMMERCIAL

## 2023-12-15 NOTE — TELEPHONE ENCOUNTER
IFX level from 12/6/23 is undetectable with positive antibodies. Of note, patient has been spacing out her Remicade infusions to every 9 week interval. The patient is aware of the risks, such as antibody formation, with this manipulation in scheduling.    Discussed results with Dr. Dunbar. Provider recommends increasing both dose and frequency of Remicade infusions in efforts to overcome antibodies and obtain adequate drug levels. Preference to increase to 10mg/kg Q4w (currently on 5mg/kg Q9w).    Called patient to discuss this recommendation. No answer; left voicemail with request for call to be returned.

## 2023-12-15 NOTE — TELEPHONE ENCOUNTER
Patient returned call. We discussed options for increasing Remicade. Patient is apprehensive about increasing her Remicade dose and frequency and is interested in discussing alternative options with Dr. Dunbar at upcoming appointment on 1/3/24. She understands why increasing Remicade is necessary to overcome antibodies. For now, she is agreeable to starting a prior authorization for 10mg/kg Q4w and will make a decision about therapy after her visit with Dr. Dunbar in a few weeks.     Last infusion 12/6/23.

## 2023-12-22 ENCOUNTER — LAB (OUTPATIENT)
Dept: LAB | Facility: CLINIC | Age: 28
End: 2023-12-22
Payer: COMMERCIAL

## 2023-12-22 DIAGNOSIS — K51.00 ULCERATIVE PANCOLITIS WITHOUT COMPLICATION (H): ICD-10-CM

## 2023-12-22 PROCEDURE — 86481 TB AG RESPONSE T-CELL SUSP: CPT

## 2023-12-22 PROCEDURE — 36415 COLL VENOUS BLD VENIPUNCTURE: CPT

## 2023-12-25 LAB
GAMMA INTERFERON BACKGROUND BLD IA-ACNC: 0.03 IU/ML
M TB IFN-G BLD-IMP: NEGATIVE
M TB IFN-G CD4+ BCKGRND COR BLD-ACNC: 9.97 IU/ML
MITOGEN IGNF BCKGRD COR BLD-ACNC: 0.02 IU/ML
MITOGEN IGNF BCKGRD COR BLD-ACNC: 0.04 IU/ML
QUANTIFERON MITOGEN: 10 IU/ML
QUANTIFERON NIL TUBE: 0.03 IU/ML
QUANTIFERON TB1 TUBE: 0.07 IU/ML
QUANTIFERON TB2 TUBE: 0.05

## 2024-01-03 ENCOUNTER — VIRTUAL VISIT (OUTPATIENT)
Dept: GASTROENTEROLOGY | Facility: CLINIC | Age: 29
End: 2024-01-03
Payer: COMMERCIAL

## 2024-01-03 DIAGNOSIS — D84.9 IMMUNOSUPPRESSION (H): ICD-10-CM

## 2024-01-03 DIAGNOSIS — K51.00 ULCERATIVE PANCOLITIS WITHOUT COMPLICATION (H): Primary | ICD-10-CM

## 2024-01-03 PROCEDURE — 99215 OFFICE O/P EST HI 40 MIN: CPT | Mod: 95 | Performed by: INTERNAL MEDICINE

## 2024-01-03 ASSESSMENT — PAIN SCALES - GENERAL: PAINLEVEL: NO PAIN (0)

## 2024-01-03 NOTE — LETTER
1/3/2024         RE: Xochitl Artis  [address]  [place] [facility] [address]        Dear Colleague,    Thank you for referring your patient, Xochitl Artis, to the [facility] [place]. Please see a copy of my visit note below.        [facility] UC follow up       PATIENT: Xochitl Caputo    MRN: [id number]    Date of Birth 1995    Tel: [phone] (home) NONE (work)    PCP: Jillian Tejada MD     HPI: Ms. Caputo is a 25 year old female here to establish care for UC.     UC history  Was sick before she was diagnosed in 2017. She was seen at [facility] prior to this and was told she had IBS and dehydration.  Hgb was 5.9. Switched care to [facility]. Tried Lialda, imuran, Humira. Transitioned to Entyvio     and has been in symptomatic remission from 11/2018-6/2020. Around June, developed a flare in the setting of psychological stress (pandemic, riots, etc). Lost 22 lb, was on 2 courses prednisone (9/17-present). Currently on 20 mg daily.  Had 3 Entyvio infusions on q4w dosing at this point. Had a flex sig 9/16/2020 (below).     Screening appointment for FMT study here at the .   8/2018 established care with [place] [facility] (integrative medicine) which helped. Switched to [facility] about a year ago.     Meds: Entyvio q4w, low dose naltrexone, levothyroxine, spirinolactone (acne)  Supplements: inflamax (powder), fish oil, reversatrol extract, tumeric with black pepper (740 mg), S. Boularmargot, VSL #3     Gets acupuncture and recently started LED light therapy (a belt of LED lights)    Macroscopic extent of disease (most recent) E3    Current UC symptoms    Bowel frequency in day 3-4   Bowel frequency in night none  Urgency of defecation YES occasionally, mild   Blood in stool only with wiping   General well being 2 = poor  Extracolonic features (multiple select) none     Constitutional symptoms:  Fever NO  Weight loss YES 22 lb since June  "    Noteworthy diet history- GFD, very low dairy, avoids eggs, avoid processed foods/sugars. Avoids raw veggies when flaring.     Total number of IBD surgeries (except perianal): 0    Current IBD Medications:  Entyvio q4w  Prednisone 20 mg daily     Past IBD Medications:   Imuran - got PNA, \"didn't feel like myself\"   Humira -- primary nonresponder   Sarah    Interval history, 11/2020 (video visit)  Has been on prednisone 25 mg daily but can't taper off this further (loose, frequent stools and more blood).   Reports no response to Humira in the past; was on this for 2 months or so. Levels were not checked and did not escalate to EW dosing.     Interval history, 12/2020 (video visit)  Currently on 20 mg prednisone daily. Having some blood, but less than before.   Continues on budesonide foam, which is helpful (but had a setback in the s/o of menstruation).   Had Stelara infusion on 11/24/2020.   Met with Dr. Jeffries on 11/23 with goals to lower fat and increase soluble fiber, follow IBD AID diet.     Current UC symptoms  Bowel frequency in day loose stools in the morning (2) and in the evening (1) (not in the afternoon)  Bowel frequency in night gas +/- stool  Urgency of defecation YES occasionally, mild   Blood in stool 50%; alternatives between the toilet and only with wiping   General well being 2 = poor  Extracolonic features (multiple select) none     Interval history, 1/2021 (video visit)  Off prednisone x 3 days. Continues on budesonide foam x .   Has been experiencing constipation and a mildly painful hemorrhoid.  Last Stelara injection was 1/20/2021    Current UC symptoms  Bowel frequency in day 3 (except 5/day with menstruation)  Bowel frequency in night none  Urgency of defecation No  Blood in stool none    General well being 0 = very well  Extracolonic features (multiple select) none      Interval history, 6/2022 (video visit)  Currently 10 w pregnant and feeling well. No morning sickness. Some fatigue, " improved after week #8. FC on 6/6 was 173. Had some blood in stool around 6/17 which resolved with budesonide foam.   Feeling well from a GI standpoint.     Remicade going well. No breakthrough or waning symptoms. In 10/2021, trough level was 14.     Switching ob specialists to [facility] in [place].   Plans to travel to [facility] at around 22 weeks gestation (Sep 2-12)    Current UC symptoms  Bowel frequency in day 2-3   Bowel frequency in night none  Urgency of defecation No  Blood in stool none    General well being 0 = very well  Extracolonic features (multiple select) none       Interval history, 12/2022 (video visit)  Currently 35w4d pregnant.   Continues on Remicade every 8 weeks monotherapy. Last infusion was on 12/7. Next infusion 2/1. Will be induced at 39 weeks on 1/20.     Doing well from a mental health standpoint; continues on sertraline (managed by PCP). Not seeing a therapist currently.      Current UC symptoms  Bowel frequency in day 2-3   Bowel frequency in night none  Urgency of defecation No  Blood in stool none    General well being 0 = very well  Extracolonic features (multiple select) none     Interval history, 1/2024 (video visit)  Just returned from [place]    Feeling well with no GI symptoms.    Current UC symptoms  Bowel frequency in day 2   Bowel frequency in night none  Urgency of defecation No  Blood in stool none    General well being 0 = very well  Extracolonic features (multiple select) abdominal rash that is itchy      Past Medical History:   Diagnosis Date    Acne     Hypothyroidism     Ulcerative colitis (H)     Ulcerative colitis (H) 10/28/2020        Past Surgical History:   Procedure Laterality Date    COLONOSCOPY N/A 5/11/2021    Procedure: COLONOSCOPY, WITH POLYPECTOMY AND BIOPSY;  Surgeon: Poonam Dunbar MD;  Location: [facility]    EYE SURGERY      ORTHOPEDIC SURGERY      right ankle.       Social History     Tobacco Use    Smoking status: Never    Smokeless tobacco: Never    Substance Use Topics    Alcohol use: Not on file       Family History   Problem Relation Age of Onset    Colitis Maternal Grandfather     Ulcerative Colitis Maternal Grandfather     Crohn's Disease No family hx of     GERD No family hx of     Stomach Cancer No family hx of        No Known Allergies     Outpatient Encounter Medications as of 1/3/2024   Medication Sig Dispense Refill    citrulline (L-CITRULLINE) 600 MG Take 600 mg by mouth 3 times daily      fish oil-omega-3 fatty acids 1000 MG capsule Take 2 g by mouth daily      inFLIXimab (REMICADE IV)       levothyroxine (SYNTHROID/LEVOTHROID) 50 MCG tablet Take 50 mcg by mouth      multivitamin w/minerals (THERA-VIT-M) tablet Take 1 tablet by mouth 2 times daily      sertraline (ZOLOFT) 50 MG tablet Take 50 mg by mouth daily      Turmeric 450 MG CAPS       Vitamin D3 (CHOLECALCIFEROL) 125 MCG (5000 UT) tablet Take 1 tablet by mouth every other day      budesonide (UCERIS) 9 MG 24 hr tablet Take 1 tablet (9 mg) by mouth every morning (Patient not taking: Reported on 12/28/2022) 30 tablet 0    calcium carbonate (OS-KATIA) 1500 (600 Ca) MG tablet Take 600 mg by mouth daily  (Patient not taking: Reported on 8/30/2022)      cannabidiol (EPIDIOLEX) 100 MG/ML oral solution Take 30 mg by mouth (Patient not taking: Reported on 6/29/2022)      dhea 25 MG TABS Take 25 mg by mouth daily      magnesium 250 MG tablet Take 1 tablet by mouth daily (Patient not taking: Reported on 12/28/2022)      spironolactone (ALDACTONE) 50 MG tablet Take 50 mg by mouth (Patient not taking: Reported on 1/3/2024)       No facility-administered encounter medications on file as of 1/3/2024.      NSAID  none    Review of Systems  Complete 10 System ROS performed. All are negative except as documented below, in the HPI, or in patient questionnaire from today's visit.    1) Constitutional: No fevers, chills, night sweats or malaise, weight loss or gain  2) Skin: No rash  3) Pulmonary: No wheeze,  SOB, cough, sputum or hemoptysis  4) Cardiovascular: No Chest pain or palpitations  5) Genitourinary: No blood in urine or dysuria  6) Endocrine: No increased sweating, hunger, thirst or thyroid problems  7) Hematologic: No bruising and easy bleeding  8) Musculoskeletal: no new pain in joints or limitation in ROM  9) Neurologic: No dizziness, paresthesias or weakness or falls  10) Psychiatric:  not depressed/anxious, no sleep problems    PHYSICAL EXAM  Vitals: There were no vitals taken for this visit.    No Pain (0)       General appearance  Healthy appearing adult, in no acute distress     Eyes  Sclera anicteric  Pupils round and reactive to light     Ears, nose, mouth and throat  No obvious external lesions of ears and nose  Hearing intact     Neck  Symmetric  No obvious external lesions     Respiratory  Normal respiration, no use of accessory muscles      MSK  Gait normal     Skin  No rashes or jaundice      Psychiatric  Oriented to person, place and time  Appropriate mood and affect.     DATA:  Reviewed in detail past documentation, medications and prior workup available in electronic health records or through outside records.    PERTINENT STUDIES:  Fecal calprotectin 6/2022 173     Most recent CBC:  WBC   Date Value Ref Range Status   11/24/2020 14.6 (H) 4.0 - 11.0 10e9/L Final     WBC Count   Date Value Ref Range Status   10/04/2023 8.1 4.0 - 11.0 10e3/uL Final   ]  Hemoglobin   Date Value Ref Range Status   10/04/2023 14.9 11.7 - 15.7 g/dL Final   11/24/2020 11.2 (L) 11.7 - 15.7 g/dL Final   ]   Platelet Count   Date Value Ref Range Status   10/04/2023 343 150 - 450 10e3/uL Final   11/24/2020 408 150 - 450 10e9/L Final     Most recent hepatic panel:  AST   Date Value Ref Range Status   10/04/2023 17 0 - 45 U/L Final     Comment:     Reference intervals for this test were updated on 6/12/2023 to more accurately reflect our healthy population. There may be differences in the flagging of prior results with  "similar values performed with this method. Interpretation of those prior results can be made in the context of the updated reference intervals.   11/24/2020 6 0 - 45 U/L Final     ALT   Date Value Ref Range Status   10/04/2023 18 0 - 50 U/L Final     Comment:     Reference intervals for this test were updated on 6/12/2023 to more accurately reflect our healthy population. There may be differences in the flagging of prior results with similar values performed with this method. Interpretation of those prior results can be made in the context of the updated reference intervals.     11/24/2020 14 0 - 50 U/L Final     No results found for: \"BILICONJ\"   Bilirubin Total   Date Value Ref Range Status   10/04/2023 0.4 <=1.2 mg/dL Final   11/24/2020 0.2 0.2 - 1.3 mg/dL Final     Albumin   Date Value Ref Range Status   10/04/2023 4.4 3.5 - 5.2 g/dL Final   12/07/2022 2.6 (L) 3.4 - 5.0 g/dL Final   11/24/2020 2.9 (L) 3.4 - 5.0 g/dL Final     Alkaline Phosphatase   Date Value Ref Range Status   10/04/2023 61 35 - 104 U/L Final   11/24/2020 51 40 - 150 U/L Final       Most recent creatinine:  Creatinine   Date Value Ref Range Status   10/04/2023 0.75 0.51 - 0.95 mg/dL Final   05/11/2021 0.68 0.52 - 1.04 mg/dL Final       Endoscopy:     Icscope 5/2021:  Impression:          - The examined portion of the ileum was normal.                        - Moderately active (Tolliver Score 2) ulcerative colitis.                        Biopsied.                        - Mild (Tolliver Score 1) pancolitis. Biopsied.                        - One less than 5 mm polyp in the descending colon,                        removed with a cold biopsy forceps. Resected and                        retrieved.         Flex sig 9/2020 showed Tolliver 3 colitis from rectum to descending colon    icscope 9/2019:  Impression:   - The examined portion of the ileum                        was normal.                       - One 10 to 11 mm polyp in the                        " sigmoid colon, removed with a hot                        snare. Resected and retrieved.                       - Pancolitis. Inflammation was found                        from the anus to the cecum. This was                        mild in severity and graded as Tolliver                        Score 1 (mild disease). Biopsied.      icscope 8/2017 showed E3 colitis (severity unclear): Congested, erythematous and eroded                        mucosa in the entire examined colon.                        Biopsied from a) right colon and b)                        left colon. Findings consistent with                        ulcerative pan colitis.    Specimens:   A) - Colon, , Right colon bx's                                                                      B) - Colon, , Left colon bx's                                                                       C) - Colon, sigmoid                                                                        FINAL DIAGNOSIS A.  Colon, right, biopsy -- Colonic mucosal fragments with minimal crypt architectural distortion and minimal, focal activity    B.  Colon, left, biopsy -- Colonic mucosal fragments with minimal crypt architectural distortion and minimal, focal activity    C.  Colon, sigmoid, polypectomy -- Inflammatory polyp       IMPRESSION:    Xochitl is a 27 year old with pan UC who recently developed ATIs 2/2 self-decreased Remicade frequency. She is feeling well and continues to be in clinical remission.  Today we had an extensive conversation about the need for biologic therapy. We did entertain the possibility of restarting Remicade (high dose) but decided against this given Xochitl's aversion to high dose biologic therapy and the risk for not being able to overcome the ATIs and have an infusion reaction.    We discussed other options, namely Simponi, retrying Stelara or Entyvio.  I do recommend initiating Simponi WITH imuran to decrease ADA formation again, but Xochitl would like  to avoid the combo therapy.     Of note, she is interested in conceiving another baby around October.     We will proceed with the following:    DIAGNOSIS:  # Pan UC, in clinical remission  # +IFX ATIs     PLAN:  --Start Simponi monotherapy. Will request insurance pre-authorization for q2w   --Reschedule colonoscopy for July for disease activity assessment (6 months after starting Simponi)  --[facility] pharmacy   --Follow up with Derm    Misc:  -- Avoid tobacco use  -- Avoid NSAIDs as there is potentially a 25% chance of causing an IBD flare    RTC 3 months    40 minutes spent on the date of the encounter performing chart review, history and exam, documentation and further activities as noted above.         Again, thank you for allowing me to participate in the care of your patient.      Sincerely,    Poonam Dunbar MD

## 2024-01-03 NOTE — PATIENT INSTRUCTIONS
PLAN  --Start Simponi monotherapy. Will request insurance pre-authorization for q2w   --Reschedule colonoscopy for July   --[facility]   --Follow up with Derm

## 2024-01-03 NOTE — NURSING NOTE
Is the patient currently in the state of [place]? YES    Visit mode:VIDEO    If the visit is dropped, the patient can be reconnected by: VIDEO VISIT: Send to e-mail at: [email or web address]    Will anyone else be joining the visit? NO  (If patient encounters technical issues they should call [phone]    How would you like to obtain your AVS? MyChart    Are changes needed to the allergy or medication list? Pt stated no changes to allergies and Pt stated no med changes    Reason for visit: VERÓNICA CRONIN

## 2024-01-03 NOTE — PROGRESS NOTES
Virtual Visit Details    Type of service:  Video Visit     Originating Location (pt. Location): Home    Distant Location (provider location):  Off-site  Platform used for Video Visit: Christine is a 27 year old who is being evaluated via a billable video visit.    Patient does not know her weight.  Patient will finish QNRS on line.       [facility] UC follow up       PATIENT: Xochitl Caputo    MRN: [id number]    Date of Birth 1995    Tel: [phone] (home) NONE (work)    PCP: Jillian Tjeada MD     HPI: Ms. Caputo is a 25 year old female here to establish care for UC.     UC history  Was sick before she was diagnosed in 2017. She was seen at [facility] prior to this and was told she had IBS and dehydration.  Hgb was 5.9. Switched care to [facility]. Tried Lialda, imuran, Humira. Transitioned to Entyvio     and has been in symptomatic remission from 11/2018-6/2020. Around June, developed a flare in the setting of psychological stress (pandemic, riots, etc). Lost 22 lb, was on 2 courses prednisone (9/17-present). Currently on 20 mg daily.  Had 3 Entyvio infusions on q4w dosing at this point. Had a flex sig 9/16/2020 (below).     Screening appointment for FMT study here at the    8/2018 established care with [place] [facility] (integrative medicine) which helped. Switched to [facility] about a year ago.     Meds: Entyvio q4w, low dose naltrexone, levothyroxine, spirinolactone (acne)  Supplements: inflamax (powder), fish oil, reversatrol extract, tumeric with black pepper (740 mg), S. Boulardi, VSL #3     Gets acupuncture and recently started LED light therapy (a belt of LED lights)    Macroscopic extent of disease (most recent) E3    Current UC symptoms    Bowel frequency in day 3-4   Bowel frequency in night none  Urgency of defecation YES occasionally, mild   Blood in stool only with wiping   General well being 2 = poor  Extracolonic features (multiple select) none  "    Constitutional symptoms:  Fever NO  Weight loss YES 22 lb since June     Noteworthy diet history- GFD, very low dairy, avoids eggs, avoid processed foods/sugars. Avoids raw veggies when flaring.     Total number of IBD surgeries (except perianal): 0    Current IBD Medications:  Entyvio q4w  Prednisone 20 mg daily     Past IBD Medications:   Imuran - got PNA, \"didn't feel like myself\"   Humira -- primary nonresponder   Lialda    Interval history, 11/2020 (video visit)  Has been on prednisone 25 mg daily but can't taper off this further (loose, frequent stools and more blood).   Reports no response to Humira in the past; was on this for 2 months or so. Levels were not checked and did not escalate to EW dosing.     Interval history, 12/2020 (video visit)  Currently on 20 mg prednisone daily. Having some blood, but less than before.   Continues on budesonide foam, which is helpful (but had a setback in the s/o of menstruation).   Had Stelara infusion on 11/24/2020.   Met with Dr. Jeffries on 11/23 with goals to lower fat and increase soluble fiber, follow IBD AID diet.     Current UC symptoms  Bowel frequency in day loose stools in the morning (2) and in the evening (1) (not in the afternoon)  Bowel frequency in night gas +/- stool  Urgency of defecation YES occasionally, mild   Blood in stool 50%; alternatives between the toilet and only with wiping   General well being 2 = poor  Extracolonic features (multiple select) none     Interval history, 1/2021 (video visit)  Off prednisone x 3 days. Continues on budesonide foam x .   Has been experiencing constipation and a mildly painful hemorrhoid.  Last Stelara injection was 1/20/2021    Current UC symptoms  Bowel frequency in day 3 (except 5/day with menstruation)  Bowel frequency in night none  Urgency of defecation No  Blood in stool none    General well being 0 = very well  Extracolonic features (multiple select) none      Interval history, 6/2022 (video " visit)  Currently 10 w pregnant and feeling well. No morning sickness. Some fatigue, improved after week #8. FC on 6/6 was 173. Had some blood in stool around 6/17 which resolved with budesonide foam.   Feeling well from a GI standpoint.     Remicade going well. No breakthrough or waning symptoms. In 10/2021, trough level was 14.     Switching ob specialists to [facility] in [place].   Plans to travel to [facility] at around 22 weeks gestation (Sep 2-12)    Current UC symptoms  Bowel frequency in day 2-3   Bowel frequency in night none  Urgency of defecation No  Blood in stool none    General well being 0 = very well  Extracolonic features (multiple select) none       Interval history, 12/2022 (video visit)  Currently 35w4d pregnant.   Continues on Remicade every 8 weeks monotherapy. Last infusion was on 12/7. Next infusion 2/1. Will be induced at 39 weeks on 1/20.     Doing well from a mental health standpoint; continues on sertraline (managed by PCP). Not seeing a therapist currently.      Current UC symptoms  Bowel frequency in day 2-3   Bowel frequency in night none  Urgency of defecation No  Blood in stool none    General well being 0 = very well  Extracolonic features (multiple select) none     Interval history, 1/2024 (video visit)  Just returned from [place]    Feeling well with no GI symptoms.    Current UC symptoms  Bowel frequency in day 2   Bowel frequency in night none  Urgency of defecation No  Blood in stool none    General well being 0 = very well  Extracolonic features (multiple select) abdominal rash that is itchy      Past Medical History:   Diagnosis Date    Acne     Hypothyroidism     Ulcerative colitis (H)     Ulcerative colitis (H) 10/28/2020        Past Surgical History:   Procedure Laterality Date    COLONOSCOPY N/A 5/11/2021    Procedure: COLONOSCOPY, WITH POLYPECTOMY AND BIOPSY;  Surgeon: Poonam Dunbar MD;  Location: [facility] OR    EYE SURGERY      ORTHOPEDIC SURGERY      right ankle.        Social History     Tobacco Use    Smoking status: Never    Smokeless tobacco: Never   Substance Use Topics    Alcohol use: Not on file       Family History   Problem Relation Age of Onset    Colitis Maternal Grandfather     Ulcerative Colitis Maternal Grandfather     Crohn's Disease No family hx of     GERD No family hx of     Stomach Cancer No family hx of        No Known Allergies     Outpatient Encounter Medications as of 1/3/2024   Medication Sig Dispense Refill    citrulline (L-CITRULLINE) 600 MG Take 600 mg by mouth 3 times daily      fish oil-omega-3 fatty acids 1000 MG capsule Take 2 g by mouth daily      inFLIXimab (REMICADE IV)       levothyroxine (SYNTHROID/LEVOTHROID) 50 MCG tablet Take 50 mcg by mouth      multivitamin w/minerals (THERA-VIT-M) tablet Take 1 tablet by mouth 2 times daily      sertraline (ZOLOFT) 50 MG tablet Take 50 mg by mouth daily      Turmeric 450 MG CAPS       Vitamin D3 (CHOLECALCIFEROL) 125 MCG (5000 UT) tablet Take 1 tablet by mouth every other day      budesonide (UCERIS) 9 MG 24 hr tablet Take 1 tablet (9 mg) by mouth every morning (Patient not taking: Reported on 12/28/2022) 30 tablet 0    calcium carbonate (OS-KATIA) 1500 (600 Ca) MG tablet Take 600 mg by mouth daily  (Patient not taking: Reported on 8/30/2022)      cannabidiol (EPIDIOLEX) 100 MG/ML oral solution Take 30 mg by mouth (Patient not taking: Reported on 6/29/2022)      dhea 25 MG TABS Take 25 mg by mouth daily      magnesium 250 MG tablet Take 1 tablet by mouth daily (Patient not taking: Reported on 12/28/2022)      spironolactone (ALDACTONE) 50 MG tablet Take 50 mg by mouth (Patient not taking: Reported on 1/3/2024)       No facility-administered encounter medications on file as of 1/3/2024.      NSAID  none    Review of Systems  Complete 10 System ROS performed. All are negative except as documented below, in the HPI, or in patient questionnaire from today's visit.    1) Constitutional: No fevers, chills,  night sweats or malaise, weight loss or gain  2) Skin: No rash  3) Pulmonary: No wheeze, SOB, cough, sputum or hemoptysis  4) Cardiovascular: No Chest pain or palpitations  5) Genitourinary: No blood in urine or dysuria  6) Endocrine: No increased sweating, hunger, thirst or thyroid problems  7) Hematologic: No bruising and easy bleeding  8) Musculoskeletal: no new pain in joints or limitation in ROM  9) Neurologic: No dizziness, paresthesias or weakness or falls  10) Psychiatric:  not depressed/anxious, no sleep problems    PHYSICAL EXAM  Vitals: There were no vitals taken for this visit.    No Pain (0)       General appearance  Healthy appearing adult, in no acute distress     Eyes  Sclera anicteric  Pupils round and reactive to light     Ears, nose, mouth and throat  No obvious external lesions of ears and nose  Hearing intact     Neck  Symmetric  No obvious external lesions     Respiratory  Normal respiration, no use of accessory muscles      MSK  Gait normal     Skin  No rashes or jaundice      Psychiatric  Oriented to person, place and time  Appropriate mood and affect.     DATA:  Reviewed in detail past documentation, medications and prior workup available in electronic health records or through outside records.    PERTINENT STUDIES:  Fecal calprotectin 6/2022 173     Most recent CBC:  WBC   Date Value Ref Range Status   11/24/2020 14.6 (H) 4.0 - 11.0 10e9/L Final     WBC Count   Date Value Ref Range Status   10/04/2023 8.1 4.0 - 11.0 10e3/uL Final   ]  Hemoglobin   Date Value Ref Range Status   10/04/2023 14.9 11.7 - 15.7 g/dL Final   11/24/2020 11.2 (L) 11.7 - 15.7 g/dL Final   ]   Platelet Count   Date Value Ref Range Status   10/04/2023 343 150 - 450 10e3/uL Final   11/24/2020 408 150 - 450 10e9/L Final     Most recent hepatic panel:  AST   Date Value Ref Range Status   10/04/2023 17 0 - 45 U/L Final     Comment:     Reference intervals for this test were updated on 6/12/2023 to more accurately reflect  "our healthy population. There may be differences in the flagging of prior results with similar values performed with this method. Interpretation of those prior results can be made in the context of the updated reference intervals.   11/24/2020 6 0 - 45 U/L Final     ALT   Date Value Ref Range Status   10/04/2023 18 0 - 50 U/L Final     Comment:     Reference intervals for this test were updated on 6/12/2023 to more accurately reflect our healthy population. There may be differences in the flagging of prior results with similar values performed with this method. Interpretation of those prior results can be made in the context of the updated reference intervals.     11/24/2020 14 0 - 50 U/L Final     No results found for: \"BILICONJ\"   Bilirubin Total   Date Value Ref Range Status   10/04/2023 0.4 <=1.2 mg/dL Final   11/24/2020 0.2 0.2 - 1.3 mg/dL Final     Albumin   Date Value Ref Range Status   10/04/2023 4.4 3.5 - 5.2 g/dL Final   12/07/2022 2.6 (L) 3.4 - 5.0 g/dL Final   11/24/2020 2.9 (L) 3.4 - 5.0 g/dL Final     Alkaline Phosphatase   Date Value Ref Range Status   10/04/2023 61 35 - 104 U/L Final   11/24/2020 51 40 - 150 U/L Final       Most recent creatinine:  Creatinine   Date Value Ref Range Status   10/04/2023 0.75 0.51 - 0.95 mg/dL Final   05/11/2021 0.68 0.52 - 1.04 mg/dL Final       Endoscopy:     Icscope 5/2021:  Impression:          - The examined portion of the ileum was normal.                        - Moderately active (Tolliver Score 2) ulcerative colitis.                        Biopsied.                        - Mild (Tolliver Score 1) pancolitis. Biopsied.                        - One less than 5 mm polyp in the descending colon,                        removed with a cold biopsy forceps. Resected and                        retrieved.         Flex sig 9/2020 showed Tolliver 3 colitis from rectum to descending colon    icscope 9/2019:  Impression:   - The examined portion of the ileum                        was " normal.                       - One 10 to 11 mm polyp in the                        sigmoid colon, removed with a hot                        snare. Resected and retrieved.                       - Pancolitis. Inflammation was found                        from the anus to the cecum. This was                        mild in severity and graded as Tolliver                        Score 1 (mild disease). Biopsied.      icscope 8/2017 showed E3 colitis (severity unclear): Congested, erythematous and eroded                        mucosa in the entire examined colon.                        Biopsied from a) right colon and b)                        left colon. Findings consistent with                        ulcerative pan colitis.    Specimens:   A) - Colon, , Right colon bx's                                                                      B) - Colon, , Left colon bx's                                                                       C) - Colon, sigmoid                                                                        FINAL DIAGNOSIS A.  Colon, right, biopsy -- Colonic mucosal fragments with minimal crypt architectural distortion and minimal, focal activity    B.  Colon, left, biopsy -- Colonic mucosal fragments with minimal crypt architectural distortion and minimal, focal activity    C.  Colon, sigmoid, polypectomy -- Inflammatory polyp       IMPRESSION:    Xochitl is a 27 year old with pan UC who recently developed ATIs 2/2 self-decreased Remicade frequency. She is feeling well and continues to be in clinical remission.  Today we had an extensive conversation about the need for biologic therapy. We did entertain the possibility of restarting Remicade (high dose) but decided against this given Xochitl's aversion to high dose biologic therapy and the risk for not being able to overcome the ATIs and have an infusion reaction.    We discussed other options, namely Simponi, retrying Stelara or Entyvio.  I do recommend  initiating Simponi WITH imuran to decrease ADA formation again, but Xochitl would like to avoid the combo therapy.     Of note, she is interested in conceiving another baby around October.     We will proceed with the following:    DIAGNOSIS:  # Pan UC, in clinical remission  # +IFX ATIs     PLAN:  --Start Simponi monotherapy. Will request insurance pre-authorization for q2w   --Reschedule colonoscopy for July for disease activity assessment (6 months after starting Simponi)  --[facility] pharmacy   --Follow up with Derm    Misc:  -- Avoid tobacco use  -- Avoid NSAIDs as there is potentially a 25% chance of causing an IBD flare    RTC 3 months    40 minutes spent on the date of the encounter performing chart review, history and exam, documentation and further activities as noted above.     Poonam Dunbar MD   of Medicine  Division of Gastroenterology, Hepatology and Nutrition  [facility]

## 2024-01-05 ENCOUNTER — TELEPHONE (OUTPATIENT)
Dept: GASTROENTEROLOGY | Facility: CLINIC | Age: 29
End: 2024-01-05

## 2024-01-05 NOTE — TELEPHONE ENCOUNTER
Prior Authorization Approval    Medication: SIMPONI 100 MG/ML SC SOAJ  Authorization Effective Date: 12/6/2023  Authorization Expiration Date: 2/4/2024  Approved Dose/Quantity: 2/28  Reference #: [id number]   Insurance Company: [employer] - Phone [phone] Fax [phone]  Expected CoPay: $    CoPay Card Available:      Financial Assistance Needed:    Which Pharmacy is filling the prescription: [facility] - [address] [facility]  Pharmacy Notified:    Patient Notified:

## 2024-01-05 NOTE — TELEPHONE ENCOUNTER
Marielena Fernandez, RN  P Endoscopy Scheduling [facility],    This patient will be starting a new therapy, so Dr. Dunbar would like to delay her colonoscopy and scope her in 6 months. The patient is aware of this.    Could you please cancel her colonoscopy on 4/1/24?    Please call the patient to reschedule for July 2024 (if your schedulers are out that far!).

## 2024-01-05 NOTE — TELEPHONE ENCOUNTER
Caller: No call made  Reason for Reschedule/Cancellation (please be detailed, any staff messages or encounters to note?): Starting new therapy and needs to delay until July.       Prior to reschedule please review:  Ordering Provider: Bettina  Sedation per order: MAC, but order note says moderate ok too.   Does patient have any ASC Exclusions, please identify?: No      Notes on Cancelled Procedure:  Procedure: Lower Endoscopy [Colonoscopy]   Date: 4/1/2024  [facility] [address] [place] [address], 5th Floor, [place] [address]  Surgeon: Bettina      Rescheduled: No, will call patient late February to schedule for July.

## 2024-01-05 NOTE — TELEPHONE ENCOUNTER
PA Initiation    Medication: SIMPONI 100 MG/ML SC SOAJ  Insurance Company: [employer] - Phone [phone] Fax [phone]  Pharmacy Filling the Rx:    Filling Pharmacy Phone:    Filling Pharmacy Fax:    Start Date: 1/5/2024  [id number]

## 2024-01-07 ENCOUNTER — HEALTH MAINTENANCE LETTER (OUTPATIENT)
Age: 29
End: 2024-01-07

## 2024-01-22 ENCOUNTER — VIRTUAL VISIT (OUTPATIENT)
Dept: GASTROENTEROLOGY | Facility: CLINIC | Age: 29
End: 2024-01-22
Attending: INTERNAL MEDICINE
Payer: COMMERCIAL

## 2024-01-22 DIAGNOSIS — K51.00 ULCERATIVE PANCOLITIS (H): Primary | ICD-10-CM

## 2024-01-22 DIAGNOSIS — Z78.9 TAKES DIETARY SUPPLEMENTS: ICD-10-CM

## 2024-01-22 RX ORDER — GOLIMUMAB 100 MG/ML
INJECTION, SOLUTION SUBCUTANEOUS
Qty: 3 ML | Refills: 0 | Status: SHIPPED | OUTPATIENT
Start: 2024-01-22 | End: 2024-05-22

## 2024-01-22 NOTE — Clinical Note
1/22/2024         RE: Xochitl Artis  [address]  [facility] [address]        Dear Colleague,    Thank you for referring your patient, Xochitl Artis, to the [facility]. Please see a copy of my visit note below.    Medication Therapy Management (MTM) Encounter    ASSESSMENT:                            Medication Adherence/Access: {adherencechoices:689330}    ***:   ***    PLAN:                            Plan for labs in April.   Will place order for Simponi. Please connect with our RNCC team when you are ready to setup injection training. I will discuss every other week versus monthly dosing with Dr. Dunbar.  Xochitl to consider the following vaccines:  Xochitl to clarify if you got another pneumonia vaccine after 2019. If so, let us know and we can recommend a plan.  Influenza vaccine     Follow-up: {followuptest2:414159}    SUBJECTIVE/OBJECTIVE:                          Xochitl Artis is a 28 year old female called for a follow-up visit from ***. {mtmvisitdetails:065884}      Reason for visit: Simponi education // IBD health maintenance review    Allergies/ADRs: None  Tobacco: She reports that she has never smoked. She has never used smokeless tobacco.  Alcohol: very occasional     Medication Adherence/Access: {fumedadherence:247235}    Ulcerative Pancolitis:   Infliximab (stopping)  Fish oil/DHEA/turmeric  Adapt align by [employer] -- bacopea extract ashwagandha, rodeola rosea      IBD Health Care Maintenance:    Vaccinations:  All patients on biologics should avoid live vaccines.    -- Influenza (every year) due for influenza, had influenza A  -- TdaP (every 10 years) last 12/29/2016, due 12/2026  -- Pneumococcal Pneumonia (once plus booster at 5 years)              - Prevnar-13 5/6/2019              - Pneumovax-23 not on file  -- COVID-19 3/9/21 and 3/31/21, caused IBD   -- Yearly assessment for latent Tb (verbal screening and exam, PPD or QuantiFERON-Tb testing)              -  negative 11/2020     One time confirmation of immunity or serologies:  -- Hepatitis A (serologies or immunizations) vaccinated 8/2/2010 and 6/17/2011  -- Hepatitis B (serologies or immunizations) vaccinated 9/1994, 11/994, and 6/1995  -- Varicella vaccinated 9/1995 and 11/2008, declined Shingrix   -- MMR second dose 5/22/2000  -- HPV (all aged 18-26)  vaccination complete 6/2011  -- Meningococcal meningitis (all patients at risk for meningitis)-- second dose 8/2015     Due to the immunosuppression in this patient, I would not advise administration of live vaccines such as varicella/VZV, intranasal influenza, MMR, or yellow fever vaccine (if traveling).       Pre-Biologic Screening:  -- Hep B Surface Antibody {ResultsSerologies:388363}  -- Hep B Surface Antigen {ResultsSerologies:170936}  -- Hep B Core Antibody {ResultsSerologies:823563}  -- Hep C Antibody {ResultsSerologies:165070}    Bone mineral density screening   -- Recommend all patients supplement with calcium and vitamin D  -- ***Given prior steroid use recommend DEXA if not already done    Cancer Screening:  Colon cancer screening:  Given ***, recommend patient undergo regular dysplasia surveillance   Next dysplasia screening is recommended ***.    Cervical cancer screening: Per OBGYN    Skin cancer screening: Annual visual exam of skin by dermatologist since patient is immunocompromised   -- Sees [facility] dermatology every six months    -- PAP after birth of son, due again in March    Depression Screening:    PHQ-2 Score:         1/3/2024     9:05 AM 12/28/2022    12:39 PM   PHQ-2 ( 1999 Pf[employer])   Q1: Little interest or pleasure in doing things 0 0   Q2: Feeling down, depressed or hopeless 0 0   PHQ-2 Score 0 0       Research:  Are you interested in being contacted about enrollment in clinical research studies? Yes     Misc:  -- Avoid tobacco use  -- Avoid NSAIDs as there is potentially a 25% chance of causing an IBD flare    :  Pro-Gest cream for 10 days  mid-cycle    Notes she is doing this to balance her hormone mid-cycle. Manufacturered by [employer].        ----------------  {PRABHAKAR?:252561}    I spent 44 minutes with this patient today. { :049997}. A copy of the visit note was provided to the patient's provider(s).    A summary of these recommendations {GIVEN/NOT GIVEN:585832}.    Nina Escalona PharmD, BCACP  MTM Pharmacist   [facility]   Phone: [phone]    Telemedicine Visit Details  Type of service:  Telephone visit  Start Time:  3:04 PM  End Time: 3:49 PM     Medication Therapy Recommendations  No medication therapy recommendations to display       Again, thank you for allowing me to participate in the care of your patient.        Sincerely,        Nina Escalona [facility]

## 2024-01-22 NOTE — PROGRESS NOTES
Medication Therapy Management (MTM) Encounter    ASSESSMENT:                            Medication Adherence/Access: See below for considerations    Ulcerative Pancolitis: Xochitl would benefit from transitioning therapies based on development of infliximab antibodies after self-adjusting medication dosing. We discussed Simponi today including general dosing, as well as increased frequency that Dr. Dunbar is recommending. I agree with the escalated dosing strategy given her history of biologics and antibody development. She does not agree to this at this time, and would prefer to use standard dosing. Discussed highlights of medication today including mechanism of action, dosing, side effects (common/serious), precautions, monitoring for safety and efficacy as well as time to efficacy. Reviewed highlight of therapeutic drug monitoring. We reviewed that it would be reasonable to have her next set of labs due in April given change in medication. Encouraged formal injection training for Simponi start. Reviewed health maintenance today. Would recommend Prevnar-20 at any time, unless she is able to confirm she did receive another pneumonia dose after 2019. Seasonal influenza vaccine also recommended.    Hormonal Balancing: Stable based on report.    PLAN:                            Plan for labs in April.   Will place order for Simponi. Please connect with our RNCC team when you are ready to setup injection training. I will discuss every other week versus monthly dosing with Dr. Dunbar.  -- staff message sent  Xochitl to consider the following vaccines:  Xochitl to clarify if you got another pneumonia vaccine after 2019. If so, let us know and we can recommend a plan.  Influenza vaccine   Reminder to schedule follow-up visit with GI clinic.    Follow-up: 6 months for MTM    Future Consideration  -- hepatitis C screening    SUBJECTIVE/OBJECTIVE:                          Xochitl Artis is a 28 year old female called for a  "follow-up visit from 12/2021.       Reason for visit: Simponi education // IBD health maintenance review    Allergies/ADRs: None  Tobacco: She reports that she has never smoked. She has never used smokeless tobacco.  Alcohol: very occasional     Medication Adherence/Access:   -- unsure on status of Simponi    Ulcerative Pancolitis:   Infliximab (stopping)  Simponi (starting)  Fish oil/DHEA/turmeric  Adapt align by [employer] -- bacopea extract ashwagandha, rodeola rosea  Vitamin D 5000 units every other day  Calcium 600 mg daily     Recently saw Dr. Dunbar who is recommending Simponi, at increased frequency if able to get coverage. Per chart notes, we do have approval for every 2 week maintenance dosing. She feels that doing standard dosing and then monitoring for response from there would be best for her. It was also recommended that she start azathioprine with Simponi, but she declined this recommendation. Plan to re-assess disease status in July (around 6 months on Simponi).          Past IBD Medications  -- Imuran - got PNA, \"didn't feel like myself\"  -- Humira, primary non-responder  -- Lialda  -- Entyvio Q4   -- infliximab, developed anti-IFX antibodies  -- Stelara    Last provider visit: 1/3/2024 with Dr. Dunbar  Next provider visit: not on file   Last labs completed: October 2023  Lab frequency: every 3 months   - standing labs available until 1/2025  Next labs due: now, but changing therapy    IBD Health Care Maintenance:    Vaccinations:  All patients on biologics should avoid live vaccines.    -- Influenza (every year) due for influenza, had influenza A  -- TdaP (every 10 years) last 12/29/2016, due 12/2026  -- Pneumococcal Pneumonia (once plus booster at 5 years)              - Prevnar-13 5/6/2019              - Pneumovax-23 not on file  -- COVID-19 3/9/21 and 3/31/21, caused IBD flare per report  -- Yearly assessment for latent Tb (verbal screening and exam, PPD or QuantiFERON-Tb testing)              - " negative 12/2023     One time confirmation of immunity or serologies:  -- Hepatitis A (serologies or immunizations) vaccinated 8/2/2010 and 6/17/2011  -- Hepatitis B (serologies or immunizations) vaccinated 9/1994, 11/994, and 6/1995  -- Varicella vaccinated 9/1995 and 11/2008, declined Shingrix   -- MMR second dose 5/22/2000  -- HPV (all aged 18-26)  vaccination complete 6/2011  -- Meningococcal meningitis (all patients at risk for meningitis)-- second dose 8/2015     Due to the immunosuppression in this patient, I would not advise administration of live vaccines such as varicella/VZV, intranasal influenza, MMR, or yellow fever vaccine (if traveling).       Pre-Biologic Screening:  -- Hep B Surface Antibody non-reactive 11/2020  -- Hep B Surface Antigen non-reactive  -- Hep B Core Antibody non-reactive  -- Hep C Antibody not on file     Cancer Screening:  Colon cancer screening:  Given pancolitis, recommend patient undergo regular dysplasia surveillance starting 8 years after diagnosis.    Cervical cancer screening: Per OBGYN    Skin cancer screening: Annual visual exam of skin by dermatologist since patient is immunocompromised   -- Sees [facility] dermatology every six months    -- PAP after birth of son, due again in March    Depression Screening:    PHQ-2 Score:         1/3/2024     9:05 AM 12/28/2022    12:39 PM   PHQ-2 ( 1999 Pfizer)   Q1: Little interest or pleasure in doing things 0 0   Q2: Feeling down, depressed or hopeless 0 0   PHQ-2 Score 0 0   -- currently on sertraline     Research:  Are you interested in being contacted about enrollment in clinical research studies? Yes     Misc:  -- Avoid tobacco use  -- Avoid NSAIDs as there is potentially a 25% chance of causing an IBD flare    Hormone Balancing:  Pro-Gest cream for 10 days mid-cycle    Notes she is doing this to balance her hormone mid-cycle. Manufacturered by [employer]. No reported concerns.    ----------------    I spent 44 minutes with this patient  today. All changes were made via collaborative practice agreement with Poonam Dunbar. A copy of the visit note was provided to the patient's provider(s).    A summary of these recommendations was sent via [employer].    Nina ChinD, BCACP  MTM Pharmacist   [facility]   Phone: [phone]    Telemedicine Visit Details  Type of service:  Telephone visit  Start Time:  3:04 PM  End Time: 3:49 PM     Medication Therapy Recommendations  Ulcerative pancolitis (H)    Rationale: Patient prefers not to take - Adherence - Adherence   Recommendation: Provide Adherence Intervention   Status: Contact Provider - Awaiting Response

## 2024-01-29 NOTE — PATIENT INSTRUCTIONS
"Recommendations from today's MTM visit:                                                       Plan for labs in April.   Will place order for Simponi. Please connect with our RNCC team when you are ready to setup injection training. I will discuss every other week versus monthly dosing with Dr. Tala Leung to consider the following vaccines:  Xochitl to clarify if you got another pneumonia vaccine after 2019. If so, let us know and we can recommend a plan.  Influenza vaccine   Reminder to schedule follow-up visit with GI clinic.    Follow-up: 6 months for MTM    Future Consideration  -- hepatitis C screening    It was great speaking with you today.  I value your experience and would be very thankful for your time in providing feedback in our clinic survey. In the next few days, you may receive an email or text message from [employer] with a link to a survey related to your  clinical pharmacist.\"     To schedule another MTM appointment, please call the clinic directly or you may call the MTM scheduling line at [phone].    My Clinical Pharmacist's contact information:                                                      Please feel free to contact me with any questions or concerns you have.      Nina ChinD, BCACP  MTM Pharmacist   [facility]   Phone: [phone]    "

## 2024-01-31 ENCOUNTER — TELEPHONE (OUTPATIENT)
Dept: GASTROENTEROLOGY | Facility: CLINIC | Age: 29
End: 2024-01-31
Payer: COMMERCIAL

## 2024-01-31 NOTE — TELEPHONE ENCOUNTER
Prior Authorization Approval    Medication: SIMPONI 100 MG/ML SC SOAJ  Authorization Effective Date: 1/31/2024  Authorization Expiration Date: 7/6/2024  Approved Dose/Quantity: ud  Reference #: [id number]   Insurance Company: [employer] - Phone [phone] Fax [phone]  Expected CoPay: $    CoPay Card Available:      Financial Assistance Needed:    Which Pharm[facility] is filling the prescription: [facility] - [address] [place] [address]  Pharmacy Notified:    Patient Notified:

## 2024-01-31 NOTE — TELEPHONE ENCOUNTER
PA Initiation    Medication: SIMPONI 100 MG/ML SC SOAJ  Insurance Company: [employer] - Phone [phone] Fax [phone]  Pharmacy Filling the Rx:    Filling Pharmacy Phone:    Filling Pharmacy Fax:    Start Date: 1/31/2024  [id number]

## 2024-02-16 ENCOUNTER — VIRTUAL VISIT (OUTPATIENT)
Dept: GASTROENTEROLOGY | Facility: CLINIC | Age: 29
End: 2024-02-16
Payer: COMMERCIAL

## 2024-02-16 DIAGNOSIS — K51.00 ULCERATIVE PANCOLITIS WITHOUT COMPLICATION (H): Primary | ICD-10-CM

## 2024-02-16 PROCEDURE — 99207 PR NO CHARGE NURSE ONLY: CPT | Mod: 95

## 2024-02-16 NOTE — NURSING NOTE
Is the patient currently in the state of [place]? YES    Visit mode:VIDEO    If the visit is dropped, the patient can be reconnected by: VIDEO VISIT: Send to e-mail at: [email or web address]    Will anyone else be joining the visit? NO  (If patient encounters technical issues they should call [phone]    How would you like to obtain your AVS? MyChart    Are changes needed to the allergy or medication list? No    Reason for visit: VERÓNICA CRONIN

## 2024-02-16 NOTE — PROGRESS NOTES
Virtual clinic visit today for first Simponi injection. The patient brought her own supply of medication to this appointment.    Education provided on medication inspection, temperature control (remove from refrigerator 30 minutes prior to injection), administration of medication, sharps disposal, and refill protocol.       Patient successfully self-administered her dose under supervision. She injected two 100mg Simponi pens as her loading dose today.     Reviewed symptoms of a reaction and monitored patient for 15 minutes after injection. No reaction noted.     All questions answered at this time. Patient feels comfortable administering her injections independently at home for future doses.    Next dose in 2 weeks.

## 2024-02-16 NOTE — PROGRESS NOTES
Virtual Visit Details    Type of service:  Video Visit     Originating Location (pt. Location): Home    Distant Location (provider location):  Off-site  Platform used for Video Visit: Nikunj

## 2024-03-15 ENCOUNTER — TELEPHONE (OUTPATIENT)
Dept: GASTROENTEROLOGY | Facility: CLINIC | Age: 29
End: 2024-03-15
Payer: COMMERCIAL

## 2024-03-15 NOTE — TELEPHONE ENCOUNTER
"Prior Authorization Not Needed per Insurance    Medication: SIMPONI 100 MG/ML SC SOAJ  Insurance Company: [employer] - Phone [phone] Fax [phone]  Expected CoPay: $    Pharmacy Filling the Rx: [facility] - [address] [facility]  Pharmacy Notified:    Patient Notified:    I talked with [employer] twice and both times they said a PA is needed. I told them the last name was Artis and they couldn't find it. So then I told them Harshal and they found it but still was getting a rejection. I called insurance and they are getting a paid claim. I had the insurance call [employer] and they got if fixed and will \"expedite\" the fill. The insurance thinks they had two accounts and was running it under the wrong account. The pharmacy said they put notes on the accounts so hopefully this doesn't happen in the future. I called and left a voicemail for the patient.  "

## 2024-03-15 NOTE — TELEPHONE ENCOUNTER
Received voicemail from patient -- she called [facility] to set up delivery of her next dose of Simponi. [facility] states they do not have an active prescription on file. Order written by Nina Escalona [facility] on 2/15/24.        Called [facility]. Prescription has been received, but they are unable to fill because insurance needs override for PLE - plan limitation exceeded. Quantity override needed. Patient has two accounts with [facility], one with  name and maiden name. [facility].

## 2024-03-25 ENCOUNTER — MYC MEDICAL ADVICE (OUTPATIENT)
Dept: GASTROENTEROLOGY | Facility: CLINIC | Age: 29
End: 2024-03-25
Payer: COMMERCIAL

## 2024-03-26 NOTE — TELEPHONE ENCOUNTER
"Called [facility]. 28 minutes on hold before able to talk to a representative.     [facility] continues to have confusion with orders because the patient has two accounts with their pharmacy. One under her maiden name \"Xochitl Caputo\" and another under her  name \"Xochitl Artis\".    Paid claim with $41 co-pay. Representative submitted an escalation as everything looks ready to schedule delivery. Escalation will take about 3 business days before delivery can be scheduled. Then the patient should request overnight delivery.    Requested [facility] merge the duplicate charts for this patient to avoid further confusion. Request submitted for charts to be merged. 24 hours to process.    Call time: 55 minutes.      "

## 2024-04-18 ENCOUNTER — MYC MEDICAL ADVICE (OUTPATIENT)
Dept: GASTROENTEROLOGY | Facility: CLINIC | Age: 29
End: 2024-04-18
Payer: COMMERCIAL

## 2024-04-18 DIAGNOSIS — K51.00 ULCERATIVE PANCOLITIS WITHOUT COMPLICATION (H): Primary | ICD-10-CM

## 2024-04-22 ENCOUNTER — TELEPHONE (OUTPATIENT)
Dept: GASTROENTEROLOGY | Facility: CLINIC | Age: 29
End: 2024-04-22
Payer: COMMERCIAL

## 2024-04-22 ENCOUNTER — HOSPITAL ENCOUNTER (OUTPATIENT)
Facility: AMBULATORY SURGERY CENTER | Age: 29
End: 2024-04-22
Attending: INTERNAL MEDICINE
Payer: COMMERCIAL

## 2024-04-22 NOTE — TELEPHONE ENCOUNTER
"Endoscopy Scheduling Screen    Have you had a positive Covid test in the last 14 days?  No    What is your communication preference for Instructions and/or Bowel Prep?   MyChart    What insurance is in the chart?  Other:  BCBS    Ordering/Referring Provider: FLAQUITO   (If ordering provider performs procedure, schedule with ordering provider unless otherwise instructed. )    BMI: Estimated body mass index is 25.06 kg/m  as calculated from the following:    Height as of 8/17/21: 1.702 m (5' 7\").    Weight as of 6/29/22: 72.6 kg (160 lb).     Sedation Ordered  MAC/deep sedation.   BMI<= 45 45 < BMI <= 48 48 < BMI < = 50  BMI > 50   No Restrictions No MG ASC  No [facility]  [facility] with exceptions Hospital Only OR Only       Do you have a history of malignant hyperthermia?  No    (Females) Are you currently pregnant?   No     Have you been diagnosed or told you have pulmonary hypertension?   No    Do you have an LVAD?  No    Have you been told you have moderate to severe sleep apnea?  No    Have you been told you have COPD, asthma, or any other lung disease?  No    Do you have any heart conditions?  No     Have you ever had or are you waiting for an organ transplant?  No. Continue scheduling, no site restrictions.    Have you had a stroke or transient ischemic attack (TIA aka \"mini stroke\" in the last 6 months?   No    Have you been diagnosed with or been told you have cirrhosis of the liver?   No    Are you currently on dialysis?   No    Do you need assistance transferring?   No    BMI: Estimated body mass index is 25.06 kg/m  as calculated from the following:    Height as of 8/17/21: 1.702 m (5' 7\").    Weight as of 6/29/22: 72.6 kg (160 lb).     Is patients BMI > 40 and scheduling location [facility]?  No    Do you take an injectable medication for weight loss or diabetes (excluding insulin)?  No    Do you take the medication Naltrexone?  No    Do you take blood thinners?  No       Prep   Are you currently on dialysis or do " you have chronic kidney disease?  No    Do you have a diagnosis of diabetes?  No    Do you have a diagnosis of cystic fibrosis (CF)?  No    On a regular basis do you go 3 -5 days between bowel movements?  No    BMI > 40?  No    Preferred Pharmacy:    [id number] - [place], [place] - [address] [place] [address]  [address] [address]  Phone: [phone] Fax: [phone]    Final Scheduling Details     Procedure scheduled  Colonoscopy    Surgeon:  BRIDGET     Date of procedure:  8/20     Pre-OP / PAC:   No - Not required for this site.    Location  [facility] - ASC - Per order.    Sedation   MAC/Deep Sedation - Per order.      Patient Reminders:   You will receive a call from a Nurse to review instructions and health history.  This assessment must be completed prior to your procedure.  Failure to complete the Nurse assessment may result in the procedure being cancelled.      On the day of your procedure, please designate an adult(s) who can drive you home stay with you for the next 24 hours. The medicines used in the exam will make you sleepy. You will not be able to drive.      You cannot take public transportation, ride share services, or non-medical taxi service without a responsible caregiver.  Medical transport services are allowed with the requirement that a responsible caregiver will receive you at your destination.  We require that drivers and caregivers are confirmed prior to your procedure.

## 2024-04-24 NOTE — TELEPHONE ENCOUNTER
Caller: Xochitl     Reason for Reschedule/Cancellation   (please be detailed, any staff messages or encounters to note?): Earlier date offered by clinic      Prior to reschedule please review:  Ordering Provider: Poonam Dunbar MD  Sedation Determined: MAC  Does patient have any ASC Exclusions, please identify?: N      Notes on Cancelled Procedure:  Procedure: Lower Endoscopy [Colonoscopy]   Date: 08/20/2024  Location: [facility]; [address] [place] [address], [address] [place] [address]  Surgeon: Sim      Rescheduled: Yes,   Procedure: Lower Endoscopy [Colonoscopy]    Date: 07/30/2024   Location: [facility]; [address] [place] [address], [address] [place] [address]   Surgeon: Bettina   Sedation Level Scheduled  MAC ,  Reason for Sedation Level Provider authorization   Instructions updated and sent: Y     Does patient need PAC or Pre -Op Rescheduled? : n       Did you cancel or rescheduled an EUS procedure? No.

## 2024-04-25 ENCOUNTER — LAB (OUTPATIENT)
Dept: LAB | Facility: CLINIC | Age: 29
End: 2024-04-25
Payer: COMMERCIAL

## 2024-04-25 DIAGNOSIS — K51.00 ULCERATIVE PANCOLITIS WITHOUT COMPLICATION (H): ICD-10-CM

## 2024-04-25 LAB
BASOPHILS # BLD AUTO: 0.1 10E3/UL (ref 0–0.2)
BASOPHILS NFR BLD AUTO: 1 %
EOSINOPHIL # BLD AUTO: 0.2 10E3/UL (ref 0–0.7)
EOSINOPHIL NFR BLD AUTO: 2 %
ERYTHROCYTE [DISTWIDTH] IN BLOOD BY AUTOMATED COUNT: 13.6 % (ref 10–15)
HCT VFR BLD AUTO: 41.3 % (ref 35–47)
HGB BLD-MCNC: 14.1 G/DL (ref 11.7–15.7)
IMM GRANULOCYTES # BLD: 0 10E3/UL
IMM GRANULOCYTES NFR BLD: 0 %
LYMPHOCYTES # BLD AUTO: 3.6 10E3/UL (ref 0.8–5.3)
LYMPHOCYTES NFR BLD AUTO: 42 %
Lab: NORMAL
MCH RBC QN AUTO: 31.5 PG (ref 26.5–33)
MCHC RBC AUTO-ENTMCNC: 34.1 G/DL (ref 31.5–36.5)
MCV RBC AUTO: 92 FL (ref 78–100)
MONOCYTES # BLD AUTO: 0.5 10E3/UL (ref 0–1.3)
MONOCYTES NFR BLD AUTO: 6 %
NEUTROPHILS # BLD AUTO: 4.2 10E3/UL (ref 1.6–8.3)
NEUTROPHILS NFR BLD AUTO: 49 %
PERFORMING LABORATORY: NORMAL
PLATELET # BLD AUTO: 291 10E3/UL (ref 150–450)
RBC # BLD AUTO: 4.48 10E6/UL (ref 3.8–5.2)
SPECIMEN STATUS: NORMAL
TEST NAME: NORMAL
WBC # BLD AUTO: 8.7 10E3/UL (ref 4–11)

## 2024-04-25 PROCEDURE — 80299 QUANTITATIVE ASSAY DRUG: CPT

## 2024-04-25 PROCEDURE — 36415 COLL VENOUS BLD VENIPUNCTURE: CPT

## 2024-04-25 PROCEDURE — 82397 CHEMILUMINESCENT ASSAY: CPT

## 2024-04-25 PROCEDURE — 86140 C-REACTIVE PROTEIN: CPT

## 2024-04-25 PROCEDURE — 80053 COMPREHEN METABOLIC PANEL: CPT

## 2024-04-25 PROCEDURE — 85025 COMPLETE CBC W/AUTO DIFF WBC: CPT

## 2024-04-26 LAB
ALBUMIN SERPL BCG-MCNC: 4.4 G/DL (ref 3.5–5.2)
ALP SERPL-CCNC: 55 U/L (ref 40–150)
ALT SERPL W P-5'-P-CCNC: 15 U/L (ref 0–50)
ANION GAP SERPL CALCULATED.3IONS-SCNC: 12 MMOL/L (ref 7–15)
AST SERPL W P-5'-P-CCNC: 19 U/L (ref 0–45)
BILIRUB SERPL-MCNC: 0.3 MG/DL
BUN SERPL-MCNC: 12.7 MG/DL (ref 6–20)
CALCIUM SERPL-MCNC: 9 MG/DL (ref 8.6–10)
CHLORIDE SERPL-SCNC: 104 MMOL/L (ref 98–107)
CREAT SERPL-MCNC: 0.56 MG/DL (ref 0.51–0.95)
CRP SERPL-MCNC: <3 MG/L
DEPRECATED HCO3 PLAS-SCNC: 21 MMOL/L (ref 22–29)
EGFRCR SERPLBLD CKD-EPI 2021: >90 ML/MIN/1.73M2
GLUCOSE SERPL-MCNC: 96 MG/DL (ref 70–99)
POTASSIUM SERPL-SCNC: 3.7 MMOL/L (ref 3.4–5.3)
PROT SERPL-MCNC: 7.6 G/DL (ref 6.4–8.3)
SODIUM SERPL-SCNC: 137 MMOL/L (ref 135–145)

## 2024-05-11 LAB — MAYO MISC RESULT: NORMAL

## 2024-05-14 ENCOUNTER — TELEPHONE (OUTPATIENT)
Dept: GASTROENTEROLOGY | Facility: CLINIC | Age: 29
End: 2024-05-14
Payer: COMMERCIAL

## 2024-05-14 NOTE — TELEPHONE ENCOUNTER
PA Initiation    Medication: SIMPONI 100 MG/ML SC SOAJ  Insurance Company: [employer] - Phone [phone] Fax [phone]  Pharmacy Filling the Rx:    Filling Pharmacy Phone:    Filling Pharmacy Fax:    Start Date: 5/14/2024  Called to start PA and am renewing the PA for the drug. I will create another encounter for the senait oden

## 2024-05-14 NOTE — TELEPHONE ENCOUNTER
Started LMN and routed it to [facility] pool    PRIOR AUTHORIZATION DENIED    Medication: SIMPONI 100 MG/ML SC SOAJ  Insurance Company: [employer] - Phone [phone] Fax [phone]  Denial Date: 5/14/2024  Denial Reason(s):      Appeal Information:      Patient Notified:

## 2024-05-14 NOTE — LETTER
"May 14, 2024    To:       RE:   Xochitl Artis  [address] [place] [address]  : 1995  Policy #: [id number]   Case/Reference: [id number]    To Whom It May Concern,    Below is the clinical summary pertinent to the appeal of simponi 100mg/ml inject one pen every 14 days. We understand that you are denying our request because its only covered for additional induction dosing.    Background   Diagnosis: Ulcerative pancolitis (H) [K51.00]   Current IBD medications:   - Simponi 100 mg subcutaneous every 28 days   Relevant drug levels:       Previous IBD Therapies:  Imuran - got PNA, \"didn't feel like myself\"   Humira -- primary nonresponder   Entyio Q4 -- non-response  Infliximab -- developed antibodies  Lialda    Last colonoscopy 2021  Impression:          - The examined portion of the ileum was normal.                        - Moderately active (Tolliver Score 2) ulcerative colitis.                        Biopsied.                        - Mild (Tolliver Score 1) pancolitis. Biopsied.                        - One less than 5 mm polyp in the descending colon,                        removed with a cold biopsy forceps. Resected and                        retrieved.     Rationale for requested therapy    Ms. Artis unfortunately suffers from ulcerative colitis with history consistent with moderate disease activity. As noted above, she has failed multiple therapies and has a history of developing antibodies to biologic therapy. We therefore feel that it is imperative that we escalate her Simponi dosing based on her most recent drug level. We know that low biologic levels can increase risk of anti-drug antibody development, which is particularly true of anti-TNF therapies such as Simponi.  Additionally, as noted above, trough levels > 4.3 mcg/mL are associated with higher rates of response and her level was 0.5 mcg/mL. We have recommended that she get a colonoscopy this summer to formally assess disease activity " response to Simponi, however, it would be extremely unfortunately if she developed antibodies to therapy prior to us getting to the six month wes on therapy. For context, we mari a drug level based on her personal history of requiring higher biologic dosing with previous therapies and history of anti-drug antibody development. We look forward to your timely determination as to not delay care for Ms. Artis.    Duration  12 months    Summary  In summary, simponi 100mg/ml inject one pen every 14 days is medically necessary for this patient s medical condition. Please call my office at [phone] if I can provide you with any additional information to approve my request. I look forward to receiving your timely response and approval of this request.     Sincerely,    Poonam Dunbar MD   of Medicine  Division of Gastroenterology, Hepatology and Nutrition  [facility]    And    Nina ChinD, BCACP  MTM Pharmacist   [facility]   Phone: [phone]    References        1. Sukhjinder I, et al. J Crohns Colitis 2016:575-581.       2. Umair WJ, et al. Gastroenterol 2014;146;85-95.       3. Lauren J, et al. Arthritis Rheum 2008;58(4):964-975.       4. Glendyatt ME, et al. [place] Rheum Dis 2013;72:381-389.       5. Alcides BARRIENTOS, et al. [place] Rheum Dis 2013;72:165-178.       6. Philly K, et al. IBD 2020; 26 (5): 766-773.

## 2024-05-14 NOTE — TELEPHONE ENCOUNTER
PA Initiation    Medication: SIMPONI 100 MG/ML SC SOAJ  Insurance Company: [employer] - Phone [phone] Fax [phone]  Pharmacy Filling the Rx: [facility] - [address] [place] [address]  Filling Pharmacy Phone:    Filling Pharmacy Fax:    Start Date: 5/14/2024  Called to start senait PAYNE

## 2024-05-14 NOTE — TELEPHONE ENCOUNTER
Prior Authorization Approval    Medication: SIMPONI 100 MG/ML SC SOAJ  Authorization Effective Date: 4/14/2024  Authorization Expiration Date: 5/14/2025  Approved Dose/Quantity: 1/28  Reference #: [id number]   Insurance Company: [employer] - Phone [phone] Fax [phone]  Expected CoPay: $    CoPay Card Available:      Financial Assistance Needed:    Which Pharmacy is filling the prescription:  [facility]  Pharmacy Notified:    Patient Notified:    [phone] radha

## 2024-05-16 NOTE — TELEPHONE ENCOUNTER
Medication Appeal Initiation    Medication: SIMPONI 100 MG/ML SC SOAJ  Appeal Start Date:  5/16/2024  Insurance Company: [employer]  Insurance Phone: [phone]  Insurance Fax: [phone]  Comments:     Faxed appeal

## 2024-05-17 ENCOUNTER — MYC MEDICAL ADVICE (OUTPATIENT)
Dept: GASTROENTEROLOGY | Facility: CLINIC | Age: 29
End: 2024-05-17
Payer: COMMERCIAL

## 2024-05-22 DIAGNOSIS — K51.00 ULCERATIVE PANCOLITIS (H): ICD-10-CM

## 2024-05-22 RX ORDER — GOLIMUMAB 100 MG/ML
100 INJECTION, SOLUTION SUBCUTANEOUS
Qty: 2 ML | Refills: 5 | Status: SHIPPED | OUTPATIENT
Start: 2024-05-22 | End: 2024-09-11

## 2024-05-22 NOTE — PROGRESS NOTES
Increasing Simponi to 100 mg every 14 days based on recent drug level. Ordered per CPA with Dr. Dunbar.

## 2024-05-22 NOTE — TELEPHONE ENCOUNTER
MEDICATION APPEAL APPROVED    Medication: SIMPONI 100 MG/ML SC SOAJ  Authorization Effective Date: 5/2/2024  Authorization Expiration Date: 11/21/2024  Approved Dose/Quantity: 1/14  Reference #:     Appeal Insurance Company: [employer]  Expected CoPay: $       CoPay Card Available:    Financial Assistance Needed:    Filling Pharmacy: [facility] [address]  Patient Notified:    Comments:

## 2024-07-09 ENCOUNTER — TELEPHONE (OUTPATIENT)
Dept: GASTROENTEROLOGY | Facility: CLINIC | Age: 29
End: 2024-07-09
Payer: COMMERCIAL

## 2024-07-09 NOTE — TELEPHONE ENCOUNTER
PER INSURANCE IT WAS APPROVED FOR A ONE TIME OVERRIDE    PRIOR AUTHORIZATION DENIED    Medication: SIMPONI 100 MG/ML SC SOAJ  Insurance Company: [employer] - Phone [phone] Fax [phone]  Denial Date: 7/9/2024  Denial Reason(s):  approved only for initiation or induction dosing    Appeal Information:      Patient Notified:

## 2024-07-09 NOTE — TELEPHONE ENCOUNTER
Medication Appeal Initiation    Medication: SIMPONI 100 MG/ML SC SOAJ  Appeal Start Date:  7/9/2024  Insurance Company: [employer]    Comments:   FAXED APPEAL LETTER

## 2024-07-10 ENCOUNTER — TELEPHONE (OUTPATIENT)
Dept: GASTROENTEROLOGY | Facility: CLINIC | Age: 29
End: 2024-07-10
Payer: COMMERCIAL

## 2024-07-10 NOTE — TELEPHONE ENCOUNTER
[facility] Call Center    Phone Message    May a detailed message be left on voicemail: yes     Reason for Call: Medication Question or concern regarding medication   Prescription Clarification  Name of Medication: golimumab (SIMPONI) 100 MG/ML auto-injector pen  Prescribing Provider: Poonam Dunbar   Pharmacy: [facility]   What on the order needs clarification? They are looking to clarify the directions, as it was written for every 14 days and the typical dosage is every 4 weeks and they would like to confirm this is accurate.      Action Taken: Message routed to:  [facility] GI    Travel Screening: Not Applicable     Date of Service:

## 2024-07-11 NOTE — TELEPHONE ENCOUNTER
Called [facility] for prescription clarification. Confirmed Simponi should be given every 14 days.

## 2024-07-17 NOTE — TELEPHONE ENCOUNTER
MEDICATION APPEAL APPROVED    Medication: SIMPONI 100 MG/ML SC SOAJ  Authorization Effective Date: 7/17/2024  Authorization Expiration Date: 11/21/2024  Approved Dose/Quantity: 2/28  Reference #:     Appeal Insurance Company:    Expected CoPay: $       CoPay Card Available:    Financial Assistance Needed:    Filling Pharmacy: [facility] [address]  Patient Notified:    Comments:

## 2024-07-20 ENCOUNTER — TELEPHONE (OUTPATIENT)
Dept: GASTROENTEROLOGY | Facility: CLINIC | Age: 29
End: 2024-07-20
Payer: COMMERCIAL

## 2024-07-20 NOTE — TELEPHONE ENCOUNTER
Pre visit planning completed.      Procedure details:    Patient scheduled for Colonoscopy on 7/30/24.     Arrival time: 1015. Procedure time 1115    Facility location: [facility]; [address] [place] [address], 5th Floor, [place] [address]. Check in location: 5th Floor.    Sedation type: Conscious sedation (per order with Dr. Bettina jean with either sedation)     Pre op exam needed? No.    Indication for procedure: UC      Chart review:     Electronic implanted devices? No    Recent diagnosis of diverticulitis within the last 6 weeks? No    Diabetic? No      Medication review:    Anticoagulants? No    NSAIDS? No    Other medication HOLDING recommendations:  N/A      Prep for procedure:     Bowel prep recommendation: Standard Miralax  Due to: standard bowel prep.    Prep instructions sent via [employer]         Melisa Vega RN  Endoscopy Procedure Pre Assessment RN  [phone] option 4

## 2024-07-22 NOTE — TELEPHONE ENCOUNTER
Pre assessment completed for upcoming procedure.   (Please see previous telephone encounter notes for complete details)    Procedure details:    Arrival time and facility location reviewed.    Pre op exam needed? No.    Designated  policy reviewed. Instructed to have someone stay 6  hours post procedure.       Medication review:    Medications reviewed. Please see supporting documentation below. Holding recommendations discussed (if applicable).       Prep for procedure:     Procedure prep instructions reviewed.        Any additional information needed:  N/A      Patient  verbalized understanding and had no questions or concerns at this time.      Melisa Vega RN  Endoscopy Procedure Pre Assessment   [phone] option 4

## 2024-07-26 RX ORDER — SODIUM CHLORIDE, SODIUM LACTATE, POTASSIUM CHLORIDE, CALCIUM CHLORIDE 600; 310; 30; 20 MG/100ML; MG/100ML; MG/100ML; MG/100ML
INJECTION, SOLUTION INTRAVENOUS CONTINUOUS
Status: CANCELLED | OUTPATIENT
Start: 2024-07-26

## 2024-07-26 RX ORDER — NALOXONE HYDROCHLORIDE 0.4 MG/ML
0.4 INJECTION, SOLUTION INTRAMUSCULAR; INTRAVENOUS; SUBCUTANEOUS
Status: CANCELLED | OUTPATIENT
Start: 2024-07-26

## 2024-07-26 RX ORDER — NALOXONE HYDROCHLORIDE 0.4 MG/ML
0.2 INJECTION, SOLUTION INTRAMUSCULAR; INTRAVENOUS; SUBCUTANEOUS
Status: CANCELLED | OUTPATIENT
Start: 2024-07-26

## 2024-07-26 RX ORDER — FLUMAZENIL 0.1 MG/ML
0.2 INJECTION, SOLUTION INTRAVENOUS
Status: CANCELLED | OUTPATIENT
Start: 2024-07-26 | End: 2024-07-27

## 2024-07-26 RX ORDER — PROCHLORPERAZINE MALEATE 10 MG
10 TABLET ORAL EVERY 6 HOURS PRN
Status: CANCELLED | OUTPATIENT
Start: 2024-07-26

## 2024-07-26 RX ORDER — ONDANSETRON 2 MG/ML
4 INJECTION INTRAMUSCULAR; INTRAVENOUS
Status: CANCELLED | OUTPATIENT
Start: 2024-07-26

## 2024-07-26 RX ORDER — ONDANSETRON 4 MG/1
4 TABLET, ORALLY DISINTEGRATING ORAL EVERY 6 HOURS PRN
Status: CANCELLED | OUTPATIENT
Start: 2024-07-26

## 2024-07-26 RX ORDER — ONDANSETRON 2 MG/ML
4 INJECTION INTRAMUSCULAR; INTRAVENOUS EVERY 6 HOURS PRN
Status: CANCELLED | OUTPATIENT
Start: 2024-07-26

## 2024-07-26 RX ORDER — LIDOCAINE 40 MG/G
CREAM TOPICAL
Status: CANCELLED | OUTPATIENT
Start: 2024-07-26

## 2024-07-29 ENCOUNTER — TELEPHONE (OUTPATIENT)
Dept: GASTROENTEROLOGY | Facility: CLINIC | Age: 29
End: 2024-07-29
Payer: COMMERCIAL

## 2024-07-29 NOTE — TELEPHONE ENCOUNTER
Caller: Xochitl Artis      Reason for Reschedule/Cancellation   (please be detailed, any staff messages or encounters to note?): Patient requested to cancel only stating she has a fever and declined rescheduling at this time, patient states she will call to reschedule when she feels better. CTL with provider      Prior to reschedule please review:  Ordering Provider: Poonam Dunbar MD in [facility]  Sedation Determined: MAC  Does patient have any ASC Exclusions, please identify?: NO      Notes on Cancelled Procedure:  Procedure: Lower Endoscopy [Colonoscopy]   Date: 7/30  [facility]; [address] [place] [address], 5th Floor, [place] [address]  Surgeon: FLAQUITO      Rescheduled: No, patient will call to reschedule when ready       Did you cancel or rescheduled an EUS procedure? No.

## 2024-07-29 NOTE — TELEPHONE ENCOUNTER
"Pt called in asking if she could have tylenol as she has a migraine.  Pt was told that she could have Tylenol and that she was advised that she should take no more that 4000 mg in a 24 hours period.  Pt also asked if she should still come into her procedure tomorrow 7/30/24 if she is \"fighting off something\"? Pt was advised that it is always good practice to have sedation or anesthesia when your body is the healthiest it can be to reduce any risks.  Pt stated that she has a 99 degree temp but does not feel like it is a fever yet.  Pt was advised to monitor her symptoms and if her temperature keeps increasing she should reschedule her colonoscopy.  Pt wanted explanation of why that matters.  Pt was educated on the possibility of her being infectious with something and being around other pts and staff, and that it is not beneficial to her own health complete a colonoscopy if she is ill.  Pt was also advised that even if she is feeling ok, there is a chance that the endo staff the day or the procedure could deem her not well enough for sedation.  Pt stated she would monitor her symptoms and would call back if she needed to.     Pt has no further questions or concerns.      Margi Gutierrez RN   "

## 2024-08-15 NOTE — TELEPHONE ENCOUNTER
Caller: Xochitl    Rescheduled: Yes,   Procedure: Lower Endoscopy [Colonoscopy]    Date: 12/30/24   Location: [facility]; [address] [place] [address], 5th Floor, [place] [address]   Surgeon: Bettina   Sedation Level Scheduled  MAC ,  Reason for Sedation Level per order   Instructions updated and sent: ysabel peralta     Does patient need PAC or Pre -Op Rescheduled? : no       Did you cancel or rescheduled an EUS procedure? No.

## 2024-08-21 ENCOUNTER — VIRTUAL VISIT (OUTPATIENT)
Dept: GASTROENTEROLOGY | Facility: CLINIC | Age: 29
End: 2024-08-21
Attending: PHARMACIST
Payer: COMMERCIAL

## 2024-08-21 VITALS — WEIGHT: 163 LBS | BODY MASS INDEX: 25.58 KG/M2 | HEIGHT: 67 IN

## 2024-08-21 DIAGNOSIS — K51.00 ULCERATIVE PANCOLITIS (H): Primary | ICD-10-CM

## 2024-08-21 DIAGNOSIS — Z78.9 TAKES DIETARY SUPPLEMENTS: ICD-10-CM

## 2024-08-21 ASSESSMENT — PAIN SCALES - GENERAL: PAINLEVEL: NO PAIN (0)

## 2024-08-21 NOTE — PROGRESS NOTES
Medication Therapy Management (MTM) Encounter    ASSESSMENT:                            Medication Adherence/Access: No issues identified    Ulcerative Colitis: Her clinical response is reassuring, but emphasized importance of lab and recommended follow-up. She does have a formal disease activity assessment scheduled for December. Encouraged provider follow-up and labs, which are due now.    Supplements: Stable.    PLAN:                            Reminder for labs to be completed at any [facility]. Will send additional refills for Simponi after labs.  Reminder to schedule follow-up with Dr. Dunbar. In order to continue your medications you must see your provider at least every 12 months.    Follow-up:    -- 6 months check-in    SUBJECTIVE/OBJECTIVE:                          Xochitl Artis is a 28 year old female seen for a follow-up visit.       Reason for visit: medication check-in    Allergies/ADRs: None  Tobacco: She reports that she has never smoked. She has never used smokeless tobacco.    Medication Adherence/Access: no issues reported  -- Notes she is taking DIM (hormonal imbalance) 1 tablet daily (100 mg)    Ulcerative Colitis:   Simponi 100 mg every 14 days    Notes she feels great. Describes herself as for sure in remission. Notes that she has felt the best she has in a very long time, notes maybe the best she has been since she was diagnosed. It was recommended at her last provider visit that she get a colonoscopy roughly six months after starting therapy, which is scheduled for December. Because she is doing so well, she would prefer to meet with her provider after this.    Last provider visit: 1/3/2024 with Dr. Dunbar  Next provider visit: not on file  Last labs completed: 2024  Lab frequency: every 3 months   - standing labs available until 2025  Next labs due: now  Last TB screenin2023  PDC: 84% (Simponi)    Supplements:   MVI twice daily   Calcium twice daily  Vitamin D 125 mcg every  "other day  DHEA 25 mg daily  Fish oil 2 g daily  Turmeric 400 mg daily  DIM hormonal balance 100 mg daily    No side effects or concerns reported.      Today's Vitals: Ht 1.702 m (5' 7\")   Wt 73.9 kg (163 lb)   BMI 25.53 kg/m    ----------------    I spent 19 minutes with this patient today. All changes were made via collaborative practice agreement with Poonam Dunabr. A copy of the visit note was provided to the patient's provider(s).    A summary of these recommendations was sent via [employer].    Nina ChinD, BCACP  MTM Pharmacist   [facility]   Phone: [phone]    Telemedicine Visit Details  Type of service:  Telephone visit  Start Time:  8:35 AM  End Time: 8:54 AM     Medication Therapy Recommendations  No medication therapy recommendations to display     "

## 2024-08-21 NOTE — NURSING NOTE
Current patient location: [address] [place] [id number]    Is the patient currently in the state of [place]? YES    Visit mode:TELEPHONE    If the visit is dropped, the patient can be reconnected by: TELEPHONE VISIT: Phone number:   Telephone Information:   Mobile [phone]       Will anyone else be joining the visit? NO  (If patient encounters technical issues they should call [phone])    How would you like to obtain your AVS? MyChart    Are changes needed to the allergy or medication list? No, Pt stated no changes to allergies, and Pt stated no med changes    Are refills needed on medications prescribed by this physician? NO    Rooming Documentation:  Questionnaire(s) not done per department protocol      Reason for visit: RECHECK (Return-MTM)    Bridgette CRONIN

## 2024-08-21 NOTE — Clinical Note
"8/21/2024      Xochitl Artis  [address] [place] [address]      Dear Colleague,    Thank you for referring your patient, Xochitl Artis, to the [facility]. Please see a copy of my visit note below.    Medication Therapy Management (MTM) Encounter    ASSESSMENT:                            Medication Adherence/Access: {adherencechoices:197078}    ***:  ***      PLAN:                            Reminder for labs.  Reminder to schedule f/up with Dr. Dunbar    Follow-up:    -- 6 months check-in    SUBJECTIVE/OBJECTIVE:                          Xochitl Artis is a 28 year old female seen for a follow-up visit.       Reason for visit: medication check-in    Allergies/ADRs: {1/2/3/4/5:063977}  Past Medical History: {1/2/3/4/5:050284}  Tobacco: She reports that she has never smoked. She has never used smokeless tobacco.  Alcohol: {ALCOHOL CONSUMPTION HX:607837}    Medication Adherence/Access: {fumedadherence:509936}  -- Notes she is taking DIM (hormonal imbalance) 1 tablet daily (100 mg)    Ulcerative Colitis:   Simponi 100 mg every 14 days    Notes she feels great. Describes herself as for sure in remission. Notes that she has felt the best she has in a very long time, notes maybe the best she has been since she was diagnosed.    Last provider visit: 1/3/2024 with Dr. Dunbar  Next provider visit:   Last labs completed: 4/2024  Lab frequency: every 3 months   - standing labs available until 1/2025  Next labs due: now  Last TB screening: ***  PDC: ***    Supplements:       Today's Vitals: Ht 1.702 m (5' 7\")   Wt 73.9 kg (163 lb)   BMI 25.53 kg/m    ----------------  {PRABHAKAR?:894289}    I spent 19 minutes with this patient today. All changes were made via collaborative practice agreement with Poonam Dunbar. A copy of the visit note was provided to the patient's provider(s).    A summary of these recommendations was sent via [employer].    Nina Escalona PharmD, BCACP  MTM Pharmacist   [facility]   Phone: [phone]    Telemedicine Visit Details  Type of service:  Telephone visit  Start Time:  8:35 AM  End Time: 8:54 AM     Medication Therapy Recommendations  No medication therapy recommendations to display         Again, thank you for allowing me to participate in the care of your patient.        Sincerely,        Nina Escalona RPH

## 2024-08-27 NOTE — PATIENT INSTRUCTIONS
"Recommendations from today's MTM visit:                                                       Reminder for labs to be completed at any [facility]. Will send additional refills for Simponi after labs.  Reminder to schedule follow-up with Dr. Dunbar. In order to continue your medications you must see your provider at least every 12 months.    Follow-up:    -- 6 months check-in    It was great speaking with you today.  I value your experience and would be very thankful for your time in providing feedback in our clinic survey. In the next few days, you may receive an email or text message from [employer] with a link to a survey related to your  clinical pharmacist.\"     To schedule another MTM appointment, please call the clinic directly or you may call the MTM scheduling line at [phone].    My Clinical Pharmacist's contact information:                                                      Please feel free to contact me with any questions or concerns you have.      Nina ChinD, BCACP  MTM Pharmacist   [facility]   Phone: [phone]    "

## 2024-09-06 ENCOUNTER — LAB (OUTPATIENT)
Dept: LAB | Facility: CLINIC | Age: 29
End: 2024-09-06
Payer: COMMERCIAL

## 2024-09-06 DIAGNOSIS — K51.00 ULCERATIVE PANCOLITIS WITHOUT COMPLICATION (H): ICD-10-CM

## 2024-09-06 LAB
ALBUMIN SERPL BCG-MCNC: 4.2 G/DL (ref 3.5–5.2)
ALP SERPL-CCNC: 52 U/L (ref 40–150)
ALT SERPL W P-5'-P-CCNC: 16 U/L (ref 0–50)
ANION GAP SERPL CALCULATED.3IONS-SCNC: 9 MMOL/L (ref 7–15)
AST SERPL W P-5'-P-CCNC: 23 U/L (ref 0–45)
BASOPHILS # BLD AUTO: 0 10E3/UL (ref 0–0.2)
BASOPHILS NFR BLD AUTO: 0 %
BILIRUB SERPL-MCNC: 0.5 MG/DL
BUN SERPL-MCNC: 12.4 MG/DL (ref 6–20)
CALCIUM SERPL-MCNC: 9.1 MG/DL (ref 8.8–10.4)
CHLORIDE SERPL-SCNC: 102 MMOL/L (ref 98–107)
CREAT SERPL-MCNC: 0.68 MG/DL (ref 0.51–0.95)
CRP SERPL-MCNC: <3 MG/L
EGFRCR SERPLBLD CKD-EPI 2021: >90 ML/MIN/1.73M2
EOSINOPHIL # BLD AUTO: 0.1 10E3/UL (ref 0–0.7)
EOSINOPHIL NFR BLD AUTO: 1 %
ERYTHROCYTE [DISTWIDTH] IN BLOOD BY AUTOMATED COUNT: 14.6 % (ref 10–15)
GLUCOSE SERPL-MCNC: 88 MG/DL (ref 70–99)
HCO3 SERPL-SCNC: 27 MMOL/L (ref 22–29)
HCT VFR BLD AUTO: 41.9 % (ref 35–47)
HGB BLD-MCNC: 14.3 G/DL (ref 11.7–15.7)
IMM GRANULOCYTES # BLD: 0 10E3/UL
IMM GRANULOCYTES NFR BLD: 0 %
LYMPHOCYTES # BLD AUTO: 2.4 10E3/UL (ref 0.8–5.3)
LYMPHOCYTES NFR BLD AUTO: 26 %
MCH RBC QN AUTO: 31.6 PG (ref 26.5–33)
MCHC RBC AUTO-ENTMCNC: 34.1 G/DL (ref 31.5–36.5)
MCV RBC AUTO: 93 FL (ref 78–100)
MONOCYTES # BLD AUTO: 0.6 10E3/UL (ref 0–1.3)
MONOCYTES NFR BLD AUTO: 7 %
NEUTROPHILS # BLD AUTO: 5.9 10E3/UL (ref 1.6–8.3)
NEUTROPHILS NFR BLD AUTO: 65 %
PLATELET # BLD AUTO: 280 10E3/UL (ref 150–450)
POTASSIUM SERPL-SCNC: 4.4 MMOL/L (ref 3.4–5.3)
PROT SERPL-MCNC: 7.3 G/DL (ref 6.4–8.3)
RBC # BLD AUTO: 4.53 10E6/UL (ref 3.8–5.2)
SODIUM SERPL-SCNC: 138 MMOL/L (ref 135–145)
WBC # BLD AUTO: 9 10E3/UL (ref 4–11)

## 2024-09-06 PROCEDURE — 85025 COMPLETE CBC W/AUTO DIFF WBC: CPT

## 2024-09-06 PROCEDURE — 80053 COMPREHEN METABOLIC PANEL: CPT

## 2024-09-06 PROCEDURE — 86140 C-REACTIVE PROTEIN: CPT

## 2024-09-06 PROCEDURE — 36415 COLL VENOUS BLD VENIPUNCTURE: CPT

## 2024-09-11 DIAGNOSIS — K51.00 ULCERATIVE PANCOLITIS (H): ICD-10-CM

## 2024-09-11 RX ORDER — GOLIMUMAB 100 MG/ML
100 INJECTION, SOLUTION SUBCUTANEOUS
Qty: 2 ML | Refills: 5 | Status: SHIPPED | OUTPATIENT
Start: 2024-09-11

## 2024-09-11 NOTE — TELEPHONE ENCOUNTER
Last provider visit: 1/3/2024 with Dr. Dunbar  Next provider visit: 2025 with Dr. Dunbar  Last labs completed: 2024  Lab frequency: every 3 months   - standing labs available until 2025  Next labs due: 2024  Last TB screenin2023  PDC: 91% (Simponi)    Simponi refilled per CPA with Dr. Dunbar

## 2024-11-04 ENCOUNTER — TELEPHONE (OUTPATIENT)
Dept: GASTROENTEROLOGY | Facility: CLINIC | Age: 29
End: 2024-11-04
Payer: COMMERCIAL

## 2024-11-04 NOTE — TELEPHONE ENCOUNTER
Caller: Asa Leung    Reason for Reschedule/Cancellation   (please be detailed, any staff messages or encounters to note?): Wants earlier appt      Prior to reschedule please review:  Ordering Provider: Bettina  Sedation Determined: MAC (Demar alicia/ Dr. Bettina hernandez for conscious sedation too)  Does patient have any ASC Exclusions, please identify?: No      Notes on Cancelled Procedure:  Procedure: Lower Endoscopy [Colonoscopy]   Date: 24  [facility]; [address] [place] [address], [address] [place] [address]  Surgeon: Bettina      Rescheduled: Yes,   Procedure: Lower Endoscopy [Colonoscopy]    Date: 24   Location: [facility]; [address] [place] [address], [address] [place] [address]   Surgeon: Gladys   Sedation Level Scheduled  Moderate ,  Reason for Sedation Level Per order, Demar alicia/ Dr. Bettina hernandez for conscious sedation too  Instructions updated and sent: [facility]     Does patient need PAC or Pre -Op Rescheduled? : No       Did you cancel or rescheduled an EUS procedure? No.     Pt's order is  - requested new one be entered.

## 2024-11-06 DIAGNOSIS — K51.00 ULCERATIVE PANCOLITIS WITHOUT COMPLICATION (H): Primary | ICD-10-CM

## 2024-11-06 NOTE — PROGRESS NOTES
Patient scheduled for colonoscopy on 24. Colonoscopy order  10/9/24. New order placed for scheduled procedure.

## 2024-11-12 ENCOUNTER — HOSPITAL ENCOUNTER (OUTPATIENT)
Facility: AMBULATORY SURGERY CENTER | Age: 29
Discharge: HOME | End: 2024-11-12
Attending: INTERNAL MEDICINE
Payer: COMMERCIAL

## 2024-11-12 VITALS
RESPIRATION RATE: 13 BRPM | WEIGHT: 165 LBS | DIASTOLIC BLOOD PRESSURE: 73 MMHG | OXYGEN SATURATION: 99 % | TEMPERATURE: 97.2 F | HEIGHT: 67 IN | SYSTOLIC BLOOD PRESSURE: 98 MMHG | HEART RATE: 76 BPM | BODY MASS INDEX: 25.9 KG/M2

## 2024-11-12 LAB
COLONOSCOPY: NORMAL
HCG UR QL: NEGATIVE
INTERNAL QC OK POCT: NORMAL
POCT KIT EXPIRATION DATE: NORMAL
POCT KIT LOT NUMBER: NORMAL

## 2024-11-12 PROCEDURE — 88305 TISSUE EXAM BY PATHOLOGIST: CPT | Mod: 26 | Performed by: PATHOLOGY

## 2024-11-12 PROCEDURE — 88305 TISSUE EXAM BY PATHOLOGIST: CPT | Mod: TC | Performed by: INTERNAL MEDICINE

## 2024-11-12 PROCEDURE — 81025 URINE PREGNANCY TEST: CPT | Performed by: PATHOLOGY

## 2024-11-12 RX ORDER — NALOXONE HYDROCHLORIDE 0.4 MG/ML
0.4 INJECTION, SOLUTION INTRAMUSCULAR; INTRAVENOUS; SUBCUTANEOUS
Status: DISCONTINUED | OUTPATIENT
Start: 2024-11-12 | End: 2024-11-13 | Stop reason: HOSPADM

## 2024-11-12 RX ORDER — NALOXONE HYDROCHLORIDE 0.4 MG/ML
0.2 INJECTION, SOLUTION INTRAMUSCULAR; INTRAVENOUS; SUBCUTANEOUS
Status: DISCONTINUED | OUTPATIENT
Start: 2024-11-12 | End: 2024-11-13 | Stop reason: HOSPADM

## 2024-11-12 RX ORDER — ONDANSETRON 2 MG/ML
4 INJECTION INTRAMUSCULAR; INTRAVENOUS EVERY 6 HOURS PRN
Status: DISCONTINUED | OUTPATIENT
Start: 2024-11-12 | End: 2024-11-13 | Stop reason: HOSPADM

## 2024-11-12 RX ORDER — PROCHLORPERAZINE MALEATE 10 MG
10 TABLET ORAL EVERY 6 HOURS PRN
Status: DISCONTINUED | OUTPATIENT
Start: 2024-11-12 | End: 2024-11-13 | Stop reason: HOSPADM

## 2024-11-12 RX ORDER — ONDANSETRON 2 MG/ML
4 INJECTION INTRAMUSCULAR; INTRAVENOUS
Status: DISCONTINUED | OUTPATIENT
Start: 2024-11-12 | End: 2024-11-12 | Stop reason: HOSPADM

## 2024-11-12 RX ORDER — FENTANYL CITRATE 50 UG/ML
INJECTION, SOLUTION INTRAMUSCULAR; INTRAVENOUS PRN
Status: DISCONTINUED | OUTPATIENT
Start: 2024-11-12 | End: 2024-11-12 | Stop reason: HOSPADM

## 2024-11-12 RX ORDER — FLUMAZENIL 0.1 MG/ML
0.2 INJECTION, SOLUTION INTRAVENOUS
Status: ACTIVE | OUTPATIENT
Start: 2024-11-12 | End: 2024-11-12

## 2024-11-12 RX ORDER — LIDOCAINE 40 MG/G
CREAM TOPICAL
Status: DISCONTINUED | OUTPATIENT
Start: 2024-11-12 | End: 2024-11-12 | Stop reason: HOSPADM

## 2024-11-12 RX ORDER — ONDANSETRON 4 MG/1
4 TABLET, ORALLY DISINTEGRATING ORAL EVERY 6 HOURS PRN
Status: DISCONTINUED | OUTPATIENT
Start: 2024-11-12 | End: 2024-11-13 | Stop reason: HOSPADM

## 2024-11-12 NOTE — H&P
Xochitl Artis  [id number]  female  29 year old      Reason for procedure/surgery: ulcerative colitis    Patient Active Problem List   Diagnosis    Pain in joint involving ankle and foot    Other postprocedural status(V45.89)    Ulcerative colitis (H)       Past Surgical History:    Past Surgical History:   Procedure Laterality Date    COLONOSCOPY N/A 5/11/2021    Procedure: COLONOSCOPY, WITH POLYPECTOMY AND BIOPSY;  Surgeon: Poonam Dunbar MD;  Location: [facility] OR    EYE SURGERY      ORTHOPEDIC SURGERY      right ankle.       Past Medical History:   Past Medical History:   Diagnosis Date    Acne     Hypothyroidism     Ulcerative colitis (H)     Ulcerative colitis (H) 10/28/2020       Social History:   Social History     Tobacco Use    Smoking status: Never    Smokeless tobacco: Never   Substance Use Topics    Alcohol use: Not on file       Family History:   Family History   Problem Relation Age of Onset    Colitis Maternal Grandfather     Ulcerative Colitis Maternal Grandfather     Crohn's Disease No family hx of     GERD No family hx of     Stomach Cancer No family hx of        Allergies: No Known Allergies    Active Medications:   Current Outpatient Medications   Medication Sig Dispense Refill    calcium carbonate (OS-KATIA) 1500 (600 Ca) MG tablet Take 600 mg by mouth daily      dhea 25 MG TABS Take 25 mg by mouth daily      fish oil-omega-3 fatty acids 1000 MG capsule Take 2 g by mouth daily      golimumab (SIMPONI) 100 MG/ML auto-injector pen Inject 1 mL (100 mg) subcutaneously every 14 days. 2 mL 5    levothyroxine (SYNTHROID/LEVOTHROID) 50 MCG tablet Take 50 mcg by mouth daily      multivitamin w/minerals (THERA-VIT-M) tablet Take 1 tablet by mouth 2 times daily      Nutritional Supplements (NUTRITIONAL SUPPLEMENT PO) Take 100 mg by mouth daily. DIM (hormonal balance)      sertraline (ZOLOFT) 50 MG tablet Take 50 mg by mouth daily      Turmeric 400 MG CAPS Take 400 mg by mouth daily      Vitamin D3  "(CHOLECALCIFEROL) 125 MCG (5000 UT) tablet Take 1 tablet by mouth every other day         Systemic Review:   CONSTITUTIONAL: NEGATIVE for fever, chills, change in weight  ENT/MOUTH: NEGATIVE for ear, mouth and throat problems  RESP: NEGATIVE for significant cough or SOB  CV: NEGATIVE for chest pain, palpitations or peripheral edema    Physical Examination:   Vital Signs: BP 99/65 (BP Location: Right arm)   Pulse 74   Temp 97  F (36.1  C) (Temporal)   Resp 16   Ht 1.702 m (5' 7\")   Wt 74.8 kg (165 lb)   SpO2 96%   BMI 25.84 kg/m    GENERAL: healthy, alert and no distress  NECK: no adenopathy, no asymmetry, masses, or scars  RESP: lungs clear to auscultation - no rales, rhonchi or wheezes  CV: regular rate and rhythm, normal S1 S2, no S3 or S4, no murmur, click or rub, no peripheral edema and peripheral pulses strong  ABDOMEN: soft, nontender, no hepatosplenomegaly, no masses and bowel sounds normal  MS: no gross musculoskeletal defects noted, no edema    Plan: Appropriate to proceed as scheduled.      Poonam Dunbar MD  11/12/2024    PCP:  Marva Gould    "

## 2024-11-13 LAB
PATH REPORT.COMMENTS IMP SPEC: NORMAL
PATH REPORT.COMMENTS IMP SPEC: NORMAL
PATH REPORT.FINAL DX SPEC: NORMAL
PATH REPORT.GROSS SPEC: NORMAL
PATH REPORT.MICROSCOPIC SPEC OTHER STN: NORMAL
PATH REPORT.RELEVANT HX SPEC: NORMAL
PHOTO IMAGE: NORMAL

## 2024-11-22 ENCOUNTER — TELEPHONE (OUTPATIENT)
Dept: GASTROENTEROLOGY | Facility: CLINIC | Age: 29
End: 2024-11-22
Payer: COMMERCIAL

## 2024-11-22 NOTE — TELEPHONE ENCOUNTER
PA Initiation    Medication: SIMPONI 100 MG/ML SC SOAJ  Insurance Company: [facility] [employer] - Phone [phone] Fax [phone]  Pharmacy Filling the Rx:    Filling Pharmacy Phone:    Filling Pharmacy Fax:    Start Date: 11/22/2024  [id number]

## 2024-11-25 NOTE — TELEPHONE ENCOUNTER
Prior Authorization Not Needed per Insurance    Medication: SIMPONI 100 MG/ML SC SOAJ  Insurance Company: [employer] [employer] - Phone [phone] Fax [phone]  Expected CoPay: $    Pharmacy Filling the Rx: [facility] [id number] [place]  Pharmacy Notified:    Patient Notified:

## 2025-01-06 ENCOUNTER — DOCUMENTATION ONLY (OUTPATIENT)
Dept: GASTROENTEROLOGY | Facility: CLINIC | Age: 30
End: 2025-01-06
Payer: COMMERCIAL

## 2025-01-06 NOTE — PROGRESS NOTES
Xochitl Artis has an upcoming lab appointment:    Future Appointments   Date Time Provider Department Center   2025  1:00 PM [facility]   2025  3:40 PM Poonam Dunbar MD [facility]     Patient is scheduled for the following lab(s): standing order labs, current orders are     There is no order available. Please review and place future orders as appropriate.    Eden Bill

## 2025-01-13 ENCOUNTER — LAB (OUTPATIENT)
Dept: LAB | Facility: CLINIC | Age: 30
End: 2025-01-13
Payer: COMMERCIAL

## 2025-01-13 DIAGNOSIS — K51.00 ULCERATIVE PANCOLITIS WITHOUT COMPLICATION (H): ICD-10-CM

## 2025-01-13 LAB
ALBUMIN SERPL BCG-MCNC: 4.4 G/DL (ref 3.5–5.2)
ALP SERPL-CCNC: 51 U/L (ref 40–150)
ALT SERPL W P-5'-P-CCNC: 12 U/L (ref 0–50)
ANION GAP SERPL CALCULATED.3IONS-SCNC: 10 MMOL/L (ref 7–15)
AST SERPL W P-5'-P-CCNC: 22 U/L (ref 0–45)
BASOPHILS # BLD AUTO: 0.1 10E3/UL (ref 0–0.2)
BASOPHILS NFR BLD AUTO: 1 %
BILIRUB SERPL-MCNC: 0.5 MG/DL
BUN SERPL-MCNC: 10.5 MG/DL (ref 6–20)
CALCIUM SERPL-MCNC: 9.3 MG/DL (ref 8.8–10.4)
CHLORIDE SERPL-SCNC: 101 MMOL/L (ref 98–107)
CREAT SERPL-MCNC: 0.76 MG/DL (ref 0.51–0.95)
CRP SERPL-MCNC: <3 MG/L
EGFRCR SERPLBLD CKD-EPI 2021: >90 ML/MIN/1.73M2
EOSINOPHIL # BLD AUTO: 0.1 10E3/UL (ref 0–0.7)
EOSINOPHIL NFR BLD AUTO: 1 %
ERYTHROCYTE [DISTWIDTH] IN BLOOD BY AUTOMATED COUNT: 12.7 % (ref 10–15)
GLUCOSE SERPL-MCNC: 85 MG/DL (ref 70–99)
HCO3 SERPL-SCNC: 27 MMOL/L (ref 22–29)
HCT VFR BLD AUTO: 43.1 % (ref 35–47)
HGB BLD-MCNC: 14.9 G/DL (ref 11.7–15.7)
IMM GRANULOCYTES # BLD: 0 10E3/UL
IMM GRANULOCYTES NFR BLD: 0 %
LYMPHOCYTES # BLD AUTO: 3.9 10E3/UL (ref 0.8–5.3)
LYMPHOCYTES NFR BLD AUTO: 40 %
MCH RBC QN AUTO: 32 PG (ref 26.5–33)
MCHC RBC AUTO-ENTMCNC: 34.6 G/DL (ref 31.5–36.5)
MCV RBC AUTO: 93 FL (ref 78–100)
MONOCYTES # BLD AUTO: 0.5 10E3/UL (ref 0–1.3)
MONOCYTES NFR BLD AUTO: 5 %
NEUTROPHILS # BLD AUTO: 5.3 10E3/UL (ref 1.6–8.3)
NEUTROPHILS NFR BLD AUTO: 53 %
PLATELET # BLD AUTO: 330 10E3/UL (ref 150–450)
POTASSIUM SERPL-SCNC: 4.3 MMOL/L (ref 3.4–5.3)
PROT SERPL-MCNC: 7.6 G/DL (ref 6.4–8.3)
RBC # BLD AUTO: 4.66 10E6/UL (ref 3.8–5.2)
SODIUM SERPL-SCNC: 138 MMOL/L (ref 135–145)
WBC # BLD AUTO: 10 10E3/UL (ref 4–11)

## 2025-01-13 PROCEDURE — 85025 COMPLETE CBC W/AUTO DIFF WBC: CPT

## 2025-01-13 PROCEDURE — 36415 COLL VENOUS BLD VENIPUNCTURE: CPT

## 2025-01-13 PROCEDURE — 86140 C-REACTIVE PROTEIN: CPT

## 2025-01-13 PROCEDURE — 80053 COMPREHEN METABOLIC PANEL: CPT

## 2025-01-30 ENCOUNTER — LAB (OUTPATIENT)
Dept: LAB | Facility: CLINIC | Age: 30
End: 2025-01-30
Payer: COMMERCIAL

## 2025-01-30 DIAGNOSIS — K51.00 ULCERATIVE PANCOLITIS WITHOUT COMPLICATION (H): ICD-10-CM

## 2025-01-30 LAB
Lab: NORMAL
PERFORMING LABORATORY: NORMAL
SPECIMEN STATUS: NORMAL
TEST NAME: NORMAL

## 2025-02-08 LAB — MAYO MISC RESULT: NORMAL

## 2025-02-12 ENCOUNTER — VIRTUAL VISIT (OUTPATIENT)
Dept: GASTROENTEROLOGY | Facility: CLINIC | Age: 30
End: 2025-02-12
Payer: COMMERCIAL

## 2025-02-12 DIAGNOSIS — K51.00 ULCERATIVE PANCOLITIS (H): Primary | ICD-10-CM

## 2025-02-12 DIAGNOSIS — D84.9 IMMUNOSUPPRESSION: ICD-10-CM

## 2025-02-12 PROCEDURE — 98007 SYNCH AUDIO-VIDEO EST HI 40: CPT | Performed by: INTERNAL MEDICINE

## 2025-02-12 NOTE — PATIENT INSTRUCTIONS
PLAN  --PA laney Conner  ----Meet with Nina Escalona, Valarie  ----Level check after 6 months   --Fecal calprotectin now and every trimester  --Let us know when you're pregnant!     --Growth US every month starting at 24 weeks  --Weekly BPP starting at 32 weeks  --Induction at 39 weeks

## 2025-02-12 NOTE — PROGRESS NOTES
[facility] UC follow up       PATIENT: Xochitl Caputo    MRN: [id number]    Date of Birth 1995    Tel: [phone] (home) NONE (work)    PCP: Jillian Tejada MD     HPI: Ms. Caputo is a 29 year old female here for follow up for UC.     UC history  Was sick before she was diagnosed in 2017. She was seen at [facility] prior to this and was told she had IBS and dehydration.  Hgb was 5.9. Switched care to [facility]. Tried Lialda, imuran, Humira. Transitioned to Entyvio     and has been in symptomatic remission from 11/2018-6/2020. Around June, developed a flare in the setting of psychological stress (pandemic, riots, etc). Lost 22 lb, was on 2 courses prednisone (9/17-present). Currently on 20 mg daily.  Had 3 Entyvio infusions on q4w dosing at this point. Had a flex sig 9/16/2020 (below).     Screening appointment for FMT study here at the .   8/2018 established care with [facility] ([facility]ative medicine) which helped. Switched to [facility] about a year ago.     Meds: Entyvio q4w, low dose naltrexone, levothyroxine, spirinolactone (acne)  Supplements: inflamax (powder), fish oil, reversatrol extract, tumeric with black pepper (740 mg), S. Boulardi, VSL #3     Gets acupuncture and recently started LED light therapy (a belt of LED lights)    Macroscopic extent of disease (most recent) E3    Current UC symptoms    Bowel frequency in day 3-4   Bowel frequency in night none  Urgency of defecation YES occasionally, mild   Blood in stool only with wiping   General well being 2 = poor  Extracolonic features (multiple select) none     Constitutional symptoms:  Fever NO  Weight loss YES 22 lb since June     Noteworthy diet history- GFD, very low dairy, avoids eggs, avoid processed foods/sugars. Avoids raw veggies when flaring.     Total number of IBD surgeries (except perianal): 0    Current IBD Medications:  Entyvio q4w  Prednisone 20 mg daily     Past IBD Medications:   Imuran -  "got PNA, \"didn't feel like myself\"   Humira -- primary nonresponder   Sarah    Interval history, 11/2020 (video visit)  Has been on prednisone 25 mg daily but can't taper off this further (loose, frequent stools and more blood).   Reports no response to Humira in the past; was on this for 2 months or so. Levels were not checked and did not escalate to EW dosing.     Interval history, 12/2020 (video visit)  Currently on 20 mg prednisone daily. Having some blood, but less than before.   Continues on budesonide foam, which is helpful (but had a setback in the s/o of menstruation).   Had Stelara infusion on 11/24/2020.   Met with Dr. Jeffries on 11/23 with goals to lower fat and increase soluble fiber, follow IBD AID diet.     Current UC symptoms  Bowel frequency in day loose stools in the morning (2) and in the evening (1) (not in the afternoon)  Bowel frequency in night gas +/- stool  Urgency of defecation YES occasionally, mild   Blood in stool 50%; alternatives between the toilet and only with wiping   General well being 2 = poor  Extracolonic features (multiple select) none     Interval history, 1/2021 (video visit)  Off prednisone x 3 days. Continues on budesonide foam x .   Has been experiencing constipation and a mildly painful hemorrhoid.  Last Stelara injection was 1/20/2021    Current UC symptoms  Bowel frequency in day 3 (except 5/day with menstruation)  Bowel frequency in night none  Urgency of defecation No  Blood in stool none    General well being 0 = very well  Extracolonic features (multiple select) none      Interval history, 6/2022 (video visit)  Currently 10 w pregnant and feeling well. No morning sickness. Some fatigue, improved after week #8. FC on 6/6 was 173. Had some blood in stool around 6/17 which resolved with budesonide foam.   Feeling well from a GI standpoint.     Remicade going well. No breakthrough or waning symptoms. In 10/2021, trough level was 14.     Switching ob specialists to " [facility] in [place].   Plans to travel to [facility] at around 22 weeks gestation (Sep 2-12)    Current UC symptoms  Bowel frequency in day 2-3   Bowel frequency in night none  Urgency of defecation No  Blood in stool none    General well being 0 = very well  Extracolonic features (multiple select) none       Interval history, 12/2022 (video visit)  Currently 35w4d pregnant.   Continues on Remicade every 8 weeks monotherapy. Last infusion was on 12/7. Next infusion 2/1. Will be induced at 39 weeks on 1/20.     Doing well from a mental health standpoint; continues on sertraline (managed by PCP). Not seeing a therapist currently.      Current UC symptoms  Bowel frequency in day 2-3   Bowel frequency in night none  Urgency of defecation No  Blood in stool none    General well being 0 = very well  Extracolonic features (multiple select) none     Interval history, 1/2024 (video visit)  Just returned from [place]    Feeling well with no GI symptoms.    Current UC symptoms  Bowel frequency in day 2   Bowel frequency in night none  Urgency of defecation No  Blood in stool none    General well being 0 = very well  Extracolonic features (multiple select) abdominal rash that is itchy      Interval history, 2/2025 (video visit)  Felt flat on imuran  Feeling well.    Current UC symptoms  Bowel frequency in day 2 in the morning   Bowel frequency in night none  Urgency of defecation No  Blood in stool none    General well being 0 = very well  Extracolonic features (multiple select) none    Past Medical History:   Diagnosis Date    Acne     Hypothyroidism     Ulcerative colitis (H)     Ulcerative colitis (H) 10/28/2020        Past Surgical History:   Procedure Laterality Date    COLONOSCOPY N/A 5/11/2021    Procedure: COLONOSCOPY, WITH POLYPECTOMY AND BIOPSY;  Surgeon: Poonam Dunbar MD;  Location: [facility] OR    COLONOSCOPY N/A 11/12/2024    Procedure: COLONOSCOPY, WITH POLYPECTOMY AND BIOPSY;  Surgeon: Poonam Dunbar MD;   Location: [facility] OR    EYE SURGERY      ORTHOPEDIC SURGERY      right ankle.       Social History     Tobacco Use    Smoking status: Never    Smokeless tobacco: Never   Substance Use Topics    Alcohol use: Not on file       Family History   Problem Relation Age of Onset    Colitis Maternal Grandfather     Ulcerative Colitis Maternal Grandfather     Crohn's Disease No family hx of     GERD No family hx of     Stomach Cancer No family hx of        No Known Allergies     Outpatient Encounter Medications as of 2/12/2025   Medication Sig Dispense Refill    calcium carbonate (OS-KATIA) 1500 (600 Ca) MG tablet Take 600 mg by mouth daily      dhea 25 MG TABS Take 25 mg by mouth daily      fish oil-omega-3 fatty acids 1000 MG capsule Take 2 g by mouth daily      golimumab (SIMPONI) 100 MG/ML auto-injector pen Inject 1 mL (100 mg) subcutaneously every 14 days. 2 mL 5    levothyroxine (SYNTHROID/LEVOTHROID) 50 MCG tablet Take 50 mcg by mouth daily      multivitamin w/minerals (THERA-VIT-M) tablet Take 1 tablet by mouth 2 times daily      Nutritional Supplements (NUTRITIONAL SUPPLEMENT PO) Take 100 mg by mouth daily. DIM (hormonal balance)      sertraline (ZOLOFT) 50 MG tablet Take 50 mg by mouth daily      Turmeric 400 MG CAPS Take 400 mg by mouth daily      Vitamin D3 (CHOLECALCIFEROL) 125 MCG (5000 UT) tablet Take 1 tablet by mouth every other day       No facility-administered encounter medications on file as of 2/12/2025.      NSAID  none    Review of Systems  Complete 10 System ROS performed. All are negative except as documented below, in the HPI, or in patient questionnaire from today's visit.    1) Constitutional: No fevers, chills, night sweats or malaise, weight loss or gain  2) Skin: No rash  3) Pulmonary: No wheeze, SOB, cough, sputum or hemoptysis  4) Cardiovascular: No Chest pain or palpitations  5) Genitourinary: No blood in urine or dysuria  6) Endocrine: No increased sweating, hunger, thirst or thyroid  "problems  7) Hematologic: No bruising and easy bleeding  8) Musculoskeletal: no new pain in joints or limitation in ROM  9) Neurologic: No dizziness, paresthesias or weakness or falls  10) Psychiatric:  not depressed/anxious, no sleep problems    PHYSICAL EXAM  Vitals: There were no vitals taken for this visit.    No Pain (0)       General appearance  Healthy appearing adult, in no acute distress     Eyes  Sclera anicteric  Pupils round and reactive to light     Ears, nose, mouth and throat  No obvious external lesions of ears and nose  Hearing intact     Neck  Symmetric  No obvious external lesions     Respiratory  Normal respiration, no use of accessory muscles      MSK  Gait normal     Skin  No rashes or jaundice      Psychiatric  Oriented to person, place and time  Appropriate mood and affect.     DATA:  Reviewed in detail past documentation, medications and prior workup available in electronic health records or through outside records.    PERTINENT STUDIES:  Fecal calprotectin 6/2022 173     Most recent CBC:  WBC   Date Value Ref Range Status   11/24/2020 14.6 (H) 4.0 - 11.0 10e9/L Final     WBC Count   Date Value Ref Range Status   01/13/2025 10.0 4.0 - 11.0 10e3/uL Final   ]  Hemoglobin   Date Value Ref Range Status   01/13/2025 14.9 11.7 - 15.7 g/dL Final   11/24/2020 11.2 (L) 11.7 - 15.7 g/dL Final   ]   Platelet Count   Date Value Ref Range Status   01/13/2025 330 150 - 450 10e3/uL Final   11/24/2020 408 150 - 450 10e9/L Final     Most recent hepatic panel:  AST   Date Value Ref Range Status   01/13/2025 22 0 - 45 U/L Final   11/24/2020 6 0 - 45 U/L Final     ALT   Date Value Ref Range Status   01/13/2025 12 0 - 50 U/L Final   11/24/2020 14 0 - 50 U/L Final     No results found for: \"BILICONJ\"   Bilirubin Total   Date Value Ref Range Status   01/13/2025 0.5 <=1.2 mg/dL Final   11/24/2020 0.2 0.2 - 1.3 mg/dL Final     Albumin   Date Value Ref Range Status   01/13/2025 4.4 3.5 - 5.2 g/dL Final   12/07/2022 " 2.6 (L) 3.4 - 5.0 g/dL Final   11/24/2020 2.9 (L) 3.4 - 5.0 g/dL Final     Alkaline Phosphatase   Date Value Ref Range Status   01/13/2025 51 40 - 150 U/L Final   11/24/2020 51 40 - 150 U/L Final       Most recent creatinine:  Creatinine   Date Value Ref Range Status   01/13/2025 0.76 0.51 - 0.95 mg/dL Final   05/11/2021 0.68 0.52 - 1.04 mg/dL Final       Endoscopy:     Icscope 5/2021:  Impression:          - The examined portion of the ileum was normal.                        - Moderately active (Tolliver Score 2) ulcerative colitis.                        Biopsied.                        - Mild (Tolliver Score 1) pancolitis. Biopsied.                        - One less than 5 mm polyp in the descending colon,                        removed with a cold biopsy forceps. Resected and                        retrieved.         Flex sig 9/2020 showed Tolliver 3 colitis from rectum to descending colon    icscope 9/2019:  Impression:   - The examined portion of the ileum                        was normal.                       - One 10 to 11 mm polyp in the                        sigmoid colon, removed with a hot                        snare. Resected and retrieved.                       - Pancolitis. Inflammation was found                        from the anus to the cecum. This was                        mild in severity and graded as Tolliver                        Score 1 (mild disease). Biopsied.      icscope 8/2017 showed E3 colitis (severity unclear): Congested, erythematous and eroded                        mucosa in the entire examined colon.                        Biopsied from a) right colon and b)                        left colon. Findings consistent with                        ulcerative pan colitis.    Specimens:   A) - Colon, , Right colon bx's                                                                      B) - Colon, , Left colon bx's                                                                       C) - Colon,  sigmoid                                                                        FINAL DIAGNOSIS A.  Colon, right, biopsy -- Colonic mucosal fragments with minimal crypt architectural distortion and minimal, focal activity    B.  Colon, left, biopsy -- Colonic mucosal fragments with minimal crypt architectural distortion and minimal, focal activity    C.  Colon, sigmoid, polypectomy -- Inflammatory polyp       IMPRESSION:    Xochitl is a 29 year old with pan UC who recently developed ATIs 2/2 self-decreased Remicade frequency. Previously, she was on Humira, Stelara, Entyvio and most recently Simponi q2w. Unfortunately, she developed ADAs to Simponi and therefore needs to switch therapy.  We discussed multiple options today, including Skyrizi, Stelara (she was on this for a very short period of time previously) and Cimzia + Imuran.  We decided to move forward with Stelara, given its low risk for antigenicity and established safety in pregnancy.     Of note, she is actively trying to get pregnant currently.    We will proceed with the following:    DIAGNOSIS:  # Pan UC, in clinical remission, but with ADAs to Simponi q2w  # +IFX ATIs     PLAN:  --Start Stelara every 4 weeks, given history of rapid clearance of biologics.  ----Meet with Nina Escalona PharmD  ----Level check after 6 months   --Fecal calprotectin now and every trimester  --Let us know when you're pregnant!     --Growth US every month starting at 24 weeks  --Weekly BPP starting at 32 weeks  --Induction at 39 weeks      Misc:  -- Avoid tobacco use  -- Avoid NSAIDs as there is potentially a 25% chance of causing an IBD flare    RTC 3 months    40 minutes spent on the date of the encounter performing chart review, history and exam, documentation and further activities as noted above.     Poonam Dunbar MD   of Medicine  Division of Gastroenterology, Hepatology and Nutrition  [facility]

## 2025-02-12 NOTE — LETTER
2/12/2025      Xochitl Artis  [address]  [facility] [address]      Dear Colleague,    Thank you for referring your patient, Xochitl Artis, to the [facility] [place]. Please see a copy of my visit note below.    [facility] UC follow up       PATIENT: Xochitl Caputo    MRN: [id number]    Date of Birth 1995    Tel: [phone] (home) NONE (work)    PCP: Jillian Tejada MD     HPI: Ms. Caputo is a 29 year old female here for follow up for UC.     UC history  Was sick before she was diagnosed in 2017. She was seen at [facility] prior to this and was told she had IBS and dehydration.  Hgb was 5.9. Switched care to [facility]. Tried Lialda, imuran, Humira. Transitioned to Entyvio     and has been in symptomatic remission from 11/2018-6/2020. Around June, developed a flare in the setting of psychological stress (pandemic, riots, etc). Lost 22 lb, was on 2 courses prednisone (9/17-present). Currently on 20 mg daily.  Had 3 Entyvio infusions on q4w dosing at this point. Had a flex sig 9/16/2020 (below).     Screening appointment for FMT study here at the .   8/2018 established care with [place] personalized medicine (integrative medicine) which helped. Switched to [facility] about a year ago.     Meds: Entyvio q4w, low dose naltrexone, levothyroxine, spirinolactone (acne)  Supplements: inflamax (powder), fish oil, reversatrol extract, tumeric with black pepper (740 mg), S. Boolivia, VSL #3     Gets acupuncture and recently started LED light therapy (a belt of LED lights)    Macroscopic extent of disease (most recent) E3    Current UC symptoms    Bowel frequency in day 3-4   Bowel frequency in night none  Urgency of defecation YES occasionally, mild   Blood in stool only with wiping   General well being 2 = poor  Extracolonic features (multiple select) none     Constitutional symptoms:  Fever NO  Weight loss YES 22 lb since June     Noteworthy diet history- GFD,  "very low dairy, avoids eggs, avoid processed foods/sugars. Avoids raw veggies when flaring.     Total number of IBD surgeries (except perianal): 0    Current IBD Medications:  Entyvio q4w  Prednisone 20 mg daily     Past IBD Medications:   Imuran - got PNA, \"didn't feel like myself\"   Humira -- primary nonresponder   Sarah    Interval history, 11/2020 (video visit)  Has been on prednisone 25 mg daily but can't taper off this further (loose, frequent stools and more blood).   Reports no response to Humira in the past; was on this for 2 months or so. Levels were not checked and did not escalate to EW dosing.     Interval history, 12/2020 (video visit)  Currently on 20 mg prednisone daily. Having some blood, but less than before.   Continues on budesonide foam, which is helpful (but had a setback in the s/o of menstruation).   Had Stelara infusion on 11/24/2020.   Met with Dr. Jeffries on 11/23 with goals to lower fat and increase soluble fiber, follow IBD AID diet.     Current UC symptoms  Bowel frequency in day loose stools in the morning (2) and in the evening (1) (not in the afternoon)  Bowel frequency in night gas +/- stool  Urgency of defecation YES occasionally, mild   Blood in stool 50%; alternatives between the toilet and only with wiping   General well being 2 = poor  Extracolonic features (multiple select) none     Interval history, 1/2021 (video visit)  Off prednisone x 3 days. Continues on budesonide foam x .   Has been experiencing constipation and a mildly painful hemorrhoid.  Last Stelara injection was 1/20/2021    Current UC symptoms  Bowel frequency in day 3 (except 5/day with menstruation)  Bowel frequency in night none  Urgency of defecation No  Blood in stool none    General well being 0 = very well  Extracolonic features (multiple select) none      Interval history, 6/2022 (video visit)  Currently 10 w pregnant and feeling well. No morning sickness. Some fatigue, improved after week #8. FC on 6/6 " was 173. Had some blood in stool around 6/17 which resolved with budesonide foam.   Feeling well from a GI standpoint.     Remicade going well. No breakthrough or waning symptoms. In 10/2021, trough level was 14.     Switching ob specialists to [facility] in [place].   Plans to travel to [facility] at around 22 weeks gestation (Sep 2-12)    Current UC symptoms  Bowel frequency in day 2-3   Bowel frequency in night none  Urgency of defecation No  Blood in stool none    General well being 0 = very well  Extracolonic features (multiple select) none       Interval history, 12/2022 (video visit)  Currently 35w4d pregnant.   Continues on Remicade every 8 weeks monotherapy. Last infusion was on 12/7. Next infusion 2/1. Will be induced at 39 weeks on 1/20.     Doing well from a mental health standpoint; continues on sertraline (managed by PCP). Not seeing a therapist currently.      Current UC symptoms  Bowel frequency in day 2-3   Bowel frequency in night none  Urgency of defecation No  Blood in stool none    General well being 0 = very well  Extracolonic features (multiple select) none     Interval history, 1/2024 (video visit)  Just returned from [place]    Feeling well with no GI symptoms.    Current UC symptoms  Bowel frequency in day 2   Bowel frequency in night none  Urgency of defecation No  Blood in stool none    General well being 0 = very well  Extracolonic features (multiple select) abdominal rash that is itchy      Interval history, 2/2025 (video visit)  Felt flat on imuran  Feeling well.    Current UC symptoms  Bowel frequency in day 2 in the morning   Bowel frequency in night none  Urgency of defecation No  Blood in stool none    General well being 0 = very well  Extracolonic features (multiple select) none    Past Medical History:   Diagnosis Date     Acne      Hypothyroidism      Ulcerative colitis (H)      Ulcerative colitis (H) 10/28/2020        Past Surgical History:   Procedure Laterality Date      COLONOSCOPY N/A 5/11/2021    Procedure: COLONOSCOPY, WITH POLYPECTOMY AND BIOPSY;  Surgeon: Poonam Dunbar MD;  Location: [facility] OR     COLONOSCOPY N/A 11/12/2024    Procedure: COLONOSCOPY, WITH POLYPECTOMY AND BIOPSY;  Surgeon: Poonam Dunbar MD;  Location: [facility] OR     EYE SURGERY       ORTHOPEDIC SURGERY      right ankle.       Social History     Tobacco Use     Smoking status: Never     Smokeless tobacco: Never   Substance Use Topics     Alcohol use: Not on file       Family History   Problem Relation Age of Onset     Colitis Maternal Grandfather      Ulcerative Colitis Maternal Grandfather      Crohn's Disease No family hx of      GERD No family hx of      Stomach Cancer No family hx of        No Known Allergies     Outpatient Encounter Medications as of 2/12/2025   Medication Sig Dispense Refill     calcium carbonate (OS-KATIA) 1500 (600 Ca) MG tablet Take 600 mg by mouth daily       dhea 25 MG TABS Take 25 mg by mouth daily       fish oil-omega-3 fatty acids 1000 MG capsule Take 2 g by mouth daily       golimumab (SIMPONI) 100 MG/ML auto-injector pen Inject 1 mL (100 mg) subcutaneously every 14 days. 2 mL 5     levothyroxine (SYNTHROID/LEVOTHROID) 50 MCG tablet Take 50 mcg by mouth daily       multivitamin w/minerals (THERA-VIT-M) tablet Take 1 tablet by mouth 2 times daily       Nutritional Supplements (NUTRITIONAL SUPPLEMENT PO) Take 100 mg by mouth daily. DIM (hormonal balance)       sertraline (ZOLOFT) 50 MG tablet Take 50 mg by mouth daily       Turmeric 400 MG CAPS Take 400 mg by mouth daily       Vitamin D3 (CHOLECALCIFEROL) 125 MCG (5000 UT) tablet Take 1 tablet by mouth every other day       No facility-administered encounter medications on file as of 2/12/2025.      NSAID  none    Review of Systems  Complete 10 System ROS performed. All are negative except as documented below, in the HPI, or in patient questionnaire from today's visit.    1) Constitutional: No fevers, chills, night sweats or malaise,  weight loss or gain  2) Skin: No rash  3) Pulmonary: No wheeze, SOB, cough, sputum or hemoptysis  4) Cardiovascular: No Chest pain or palpitations  5) Genitourinary: No blood in urine or dysuria  6) Endocrine: No increased sweating, hunger, thirst or thyroid problems  7) Hematologic: No bruising and easy bleeding  8) Musculoskeletal: no new pain in joints or limitation in ROM  9) Neurologic: No dizziness, paresthesias or weakness or falls  10) Psychiatric:  not depressed/anxious, no sleep problems    PHYSICAL EXAM  Vitals: There were no vitals taken for this visit.    No Pain (0)       General appearance  Healthy appearing adult, in no acute distress     Eyes  Sclera anicteric  Pupils round and reactive to light     Ears, nose, mouth and throat  No obvious external lesions of ears and nose  Hearing intact     Neck  Symmetric  No obvious external lesions     Respiratory  Normal respiration, no use of accessory muscles      MSK  Gait normal     Skin  No rashes or jaundice      Psychiatric  Oriented to person, place and time  Appropriate mood and affect.     DATA:  Reviewed in detail past documentation, medications and prior workup available in electronic health records or through outside records.    PERTINENT STUDIES:  Fecal calprotectin 6/2022 173     Most recent CBC:  WBC   Date Value Ref Range Status   11/24/2020 14.6 (H) 4.0 - 11.0 10e9/L Final     WBC Count   Date Value Ref Range Status   01/13/2025 10.0 4.0 - 11.0 10e3/uL Final   ]  Hemoglobin   Date Value Ref Range Status   01/13/2025 14.9 11.7 - 15.7 g/dL Final   11/24/2020 11.2 (L) 11.7 - 15.7 g/dL Final   ]   Platelet Count   Date Value Ref Range Status   01/13/2025 330 150 - 450 10e3/uL Final   11/24/2020 408 150 - 450 10e9/L Final     Most recent hepatic panel:  AST   Date Value Ref Range Status   01/13/2025 22 0 - 45 U/L Final   11/24/2020 6 0 - 45 U/L Final     ALT   Date Value Ref Range Status   01/13/2025 12 0 - 50 U/L Final   11/24/2020 14 0 - 50 U/L  "Final     No results found for: \"BILICONJ\"   Bilirubin Total   Date Value Ref Range Status   01/13/2025 0.5 <=1.2 mg/dL Final   11/24/2020 0.2 0.2 - 1.3 mg/dL Final     Albumin   Date Value Ref Range Status   01/13/2025 4.4 3.5 - 5.2 g/dL Final   12/07/2022 2.6 (L) 3.4 - 5.0 g/dL Final   11/24/2020 2.9 (L) 3.4 - 5.0 g/dL Final     Alkaline Phosphatase   Date Value Ref Range Status   01/13/2025 51 40 - 150 U/L Final   11/24/2020 51 40 - 150 U/L Final       Most recent creatinine:  Creatinine   Date Value Ref Range Status   01/13/2025 0.76 0.51 - 0.95 mg/dL Final   05/11/2021 0.68 0.52 - 1.04 mg/dL Final       Endoscopy:     Icscope 5/2021:  Impression:          - The examined portion of the ileum was normal.                        - Moderately active (Tolliver Score 2) ulcerative colitis.                        Biopsied.                        - Mild (Tolliver Score 1) pancolitis. Biopsied.                        - One less than 5 mm polyp in the descending colon,                        removed with a cold biopsy forceps. Resected and                        retrieved.         Flex sig 9/2020 showed Tolliver 3 colitis from rectum to descending colon    icscope 9/2019:  Impression:   - The examined portion of the ileum                        was normal.                       - One 10 to 11 mm polyp in the                        sigmoid colon, removed with a hot                        snare. Resected and retrieved.                       - Pancolitis. Inflammation was found                        from the anus to the cecum. This was                        mild in severity and graded as Tolliver                        Score 1 (mild disease). Biopsied.      icscope 8/2017 showed E3 colitis (severity unclear): Congested, erythematous and eroded                        mucosa in the entire examined colon.                        Biopsied from a) right colon and b)                        left colon. Findings consistent with                     "    ulcerative pan colitis.    Specimens:   A) - Colon, , Right colon bx's                                                                      B) - Colon, , Left colon bx's                                                                       C) - Colon, sigmoid                                                                        FINAL DIAGNOSIS A.  Colon, right, biopsy -- Colonic mucosal fragments with minimal crypt architectural distortion and minimal, focal activity    B.  Colon, left, biopsy -- Colonic mucosal fragments with minimal crypt architectural distortion and minimal, focal activity    C.  Colon, sigmoid, polypectomy -- Inflammatory polyp       IMPRESSION:    Xochitl is a 29 year old with pan UC who recently developed ATIs 2/2 self-decreased Remicade frequency. Previously, she was on Humira, Stelara, Entyvio and most recently Simponi q2w. Unfortunately, she developed ADAs to Simponi and therefore needs to switch therapy.  We discussed multiple options today, including Skyrizi, Stelara (she was on this for a very short period of time previously) and Cimzia + Imuran.  We decided to move forward with Stelara, given its low risk for antigenicity and established safety in pregnancy.     Of note, she is actively trying to get pregnant currently.    We will proceed with the following:    DIAGNOSIS:  # Pan UC, in clinical remission, but with ADAs to Simponi q2w  # +IFX ATIs     PLAN:  --Start Stelara  ----Meet with Nina Escalona PharmD  ----Level check after 6 months   --Fecal calprotectin now and every trimester  --Let us know when you're pregnant!     --Growth US every month starting at 24 weeks  --Weekly BPP starting at 32 weeks  --Induction at 39 weeks      Misc:  -- Avoid tobacco use  -- Avoid NSAIDs as there is potentially a 25% chance of causing an IBD flare    RTC 3 months    40 minutes spent on the date of the encounter performing chart review, history and exam, documentation and further activities  as noted above.     Poonam Dunbar MD   of Medicine  Division of Gastroenterology, Hepatology and Nutrition  [facility]      Again, thank you for allowing me to participate in the care of your patient.        Sincerely,        Poonam Dunbar MD    Electronically signed

## 2025-02-12 NOTE — NURSING NOTE
Current patient location: [address] [place] [id number]    Is the patient currently in the state of [place]? YES    Visit mode: VIDEO    If the visit is dropped, the patient can be reconnected by:VIDEO VISIT: Send to e-mail at: [email or web address]    Will anyone else be joining the visit? NO  (If patient encounters technical issues they should call [phone])    Are changes needed to the allergy or medication list? No    Are refills needed on medications prescribed by this physician? Discuss with provider    Rooming Documentation:  Questionnaire(s) completed    Reason for visit: RECHECK    [place] CRONIN

## 2025-02-13 ENCOUNTER — MYC MEDICAL ADVICE (OUTPATIENT)
Dept: GASTROENTEROLOGY | Facility: CLINIC | Age: 30
End: 2025-02-13
Payer: COMMERCIAL

## 2025-02-13 DIAGNOSIS — K51.911 ULCERATIVE COLITIS WITH RECTAL BLEEDING, UNSPECIFIED LOCATION (H): Primary | ICD-10-CM

## 2025-02-13 RX ORDER — METHYLPREDNISOLONE SODIUM SUCCINATE 40 MG/ML
40 INJECTION INTRAMUSCULAR; INTRAVENOUS
Start: 2025-02-13

## 2025-02-13 RX ORDER — ALBUTEROL SULFATE 0.83 MG/ML
2.5 SOLUTION RESPIRATORY (INHALATION)
OUTPATIENT
Start: 2025-02-13

## 2025-02-13 RX ORDER — ALBUTEROL SULFATE 90 UG/1
1-2 INHALANT RESPIRATORY (INHALATION)
Start: 2025-02-13

## 2025-02-13 RX ORDER — DIPHENHYDRAMINE HYDROCHLORIDE 50 MG/ML
25 INJECTION INTRAMUSCULAR; INTRAVENOUS
Start: 2025-02-13

## 2025-02-13 RX ORDER — DIPHENHYDRAMINE HYDROCHLORIDE 50 MG/ML
50 INJECTION INTRAMUSCULAR; INTRAVENOUS
Start: 2025-02-13

## 2025-02-13 RX ORDER — MEPERIDINE HYDROCHLORIDE 25 MG/ML
25 INJECTION INTRAMUSCULAR; INTRAVENOUS; SUBCUTANEOUS
OUTPATIENT
Start: 2025-02-13

## 2025-02-13 RX ORDER — HEPARIN SODIUM (PORCINE) LOCK FLUSH IV SOLN 100 UNIT/ML 100 UNIT/ML
5 SOLUTION INTRAVENOUS
OUTPATIENT
Start: 2025-02-13

## 2025-02-13 RX ORDER — HEPARIN SODIUM,PORCINE 10 UNIT/ML
5-20 VIAL (ML) INTRAVENOUS DAILY PRN
OUTPATIENT
Start: 2025-02-13

## 2025-02-13 RX ORDER — EPINEPHRINE 1 MG/ML
0.3 INJECTION, SOLUTION, CONCENTRATE INTRAVENOUS EVERY 5 MIN PRN
OUTPATIENT
Start: 2025-02-13

## 2025-02-13 NOTE — TELEPHONE ENCOUNTER
-- Simponi prescription discontinued  -- Therapy plan initiated for Stelara 390mg loading infusion  -- Notified pharmacy liaison to initiate PA for Stelara 90mg/mL every 28 days  -- Quant Gold TB due now; order placed   -- Hepatitis B serologies last completed in 2020  _________________________________________________________  Per clinic visit w/ Dr. Dunbar 2/12/25:

## 2025-02-22 ENCOUNTER — HEALTH MAINTENANCE LETTER (OUTPATIENT)
Age: 30
End: 2025-02-22

## 2025-03-14 ENCOUNTER — VIRTUAL VISIT (OUTPATIENT)
Dept: PHARMACY | Facility: CLINIC | Age: 30
End: 2025-03-14
Attending: INTERNAL MEDICINE
Payer: COMMERCIAL

## 2025-03-14 VITALS — BODY MASS INDEX: 26.29 KG/M2 | HEIGHT: 67 IN | WEIGHT: 167.5 LBS

## 2025-03-14 DIAGNOSIS — E03.9 HYPOTHYROIDISM, UNSPECIFIED TYPE: ICD-10-CM

## 2025-03-14 DIAGNOSIS — K51.00 ULCERATIVE PANCOLITIS WITHOUT COMPLICATION (H): Primary | ICD-10-CM

## 2025-03-14 DIAGNOSIS — Z78.9 TAKES DIETARY SUPPLEMENTS: ICD-10-CM

## 2025-03-14 RX ORDER — USTEKINUMAB 90 MG/ML
90 INJECTION, SOLUTION SUBCUTANEOUS
Qty: 1 ML | Refills: 5 | Status: SHIPPED | OUTPATIENT
Start: 2025-03-14

## 2025-03-14 ASSESSMENT — PAIN SCALES - GENERAL: PAINLEVEL_OUTOF10: NO PAIN (0)

## 2025-03-14 NOTE — NURSING NOTE
Current patient location: [address] [place] [id number]    Is the patient currently in the state of [place]? YES    Visit mode: TELEPHONE    If the visit is dropped, the patient can be reconnected by:TELEPHONE VISIT: Phone number:   Telephone Information:   Mobile [phone]       Will anyone else be joining the visit? NO  (If patient encounters technical issues they should call [phone])    Are changes needed to the allergy or medication list? Pt completed echeck-in an confirms medications and allergies are correct.      Are refills needed on medications prescribed by this physician? NO    Rooming Documentation:  Questionnaire(s) not done per department protocol    Reason for visit: RECHECK    Júnior CRONIN

## 2025-03-14 NOTE — PROGRESS NOTES
Medication Therapy Management (MTM) Encounter    ASSESSMENT:                            Medication Adherence/Access: No issues identified.    Ulcerative Colitis/Supplements: Xochitl would benefit from continuing on Stelara therapy for maintenance of remission. Agree with plan to move forward with escalated dosing given history of ongoing disease activity with standard dosed Stelara previously. She is up-to-date on labs, and has provider follow-up scheduled. Will renew standing labs. Included expected Stelara dosing during pregnancy, however, will wait on adjusting dose until she has had a dating ultrasound. Based on how things are landing currently, this schedule will likely require only minimal adjustment for delivery.    Hypothyroid: Stable.    PLAN:                            Continue with plan for Stelara 90 mg subcutaneously every 28 days.    Please let us know when you have confirmed your due date with your dating ultrasound. The Stelara dosing timeline as it stands is in my progress note, and we can make adjustments as needed going forward based on what is established as your due date.    Follow-up:    -- 3-6 months for MTM, sooner if needed    SUBJECTIVE/OBJECTIVE:                          Xochitl Artis is a 29 year old female seen for a follow-up visit.       Reason for visit: Stelara re-start    Allergies/ADRs: None  Tobacco: She reports that she has never smoked. She has never been exposed to tobacco smoke. She has never used smokeless tobacco.  Alcohol: none      Medication Adherence/Access:   -- PA for Q4 week Stelara approved    Ulcerative Colitis/Supplements:   Stelara 90 mg subcutaneously every 28 days  MVI daily   Vitamin D 125 mcg daily  Calcium daily   Fish oil/DHEA daily     Xochitl recently switched therapies given anti-body development to Simponi. After discussing with Dr. Dunbar, she opted to move forward with Stelara. Completed Stelara re-loading on 3/7/2025. Currently 7 w pregnant. Estimating a  10/30/2025 due date. Notes that what she worries about liver/kidney dysfunction in the long term with these medications. Notes that when she was sick, she did notice some diarrhea but her son also had this so she is thinking it was related to the illness.  She had sent a [employer] message earlier his week indicating she had a progressively worsening cough after her Stelara infusion. She was seen by urgent care and is doing better from that perspective. She has been on Stelara previously, and has no current questions. Of note, she was previously on Q8 Stelara with evidence of continued disease activity, therefore, we planned on moving to every four week dosing out of the gate this time.    Last provider visit: 2025 with Dr. Dunbar  Next provider visit: 2025 with Dr. Dunbar  Last labs completed: 2025  Lab frequency: every 3 months   - standing labs   Next labs due: 2025  Last TB screening: negative 2023    Therapy Start Date: 3/2025    Colonoscopy 2024  Impression:            - The examined portion of the ileum was normal.                          - Inactive (Tolliver Score 0) ulcerative colitis, in                          remission. Biopsied.                          - Three 5 to 6 mm polyps in the ascending colon,                          removed with a cold snare. Resected and retrieved.                          - One 5 mm polyp at the hepatic flexure, removed with                          a cold snare. Resected and retrieved.     Stelara dosing timeline     Date Pregnancy Week Expected Date   3/6 6  Stelara dosed (loading)   3/13 7    3/20 8    3/27 9    4/3 10 4/4   4/10 11     12     13     14 5/   5 15    5/15 16     17     18  19     20     21     22    7/3 23    7/10 24     25     26  27    8/ 28     29     30  31     32     33     34  35    10/ 36    10/ 37    10/16 38 10/17  "  10/23 39    10/30 40      Hypothyroidism:   Levothyroxine 50 mcg    Denies concerns today. Notes this was checked recently and TSH was within normal limits. Notes her OB also watches this closely during pregnancy.    Today's Vitals: Ht 5' 7\" (1.702 m)   Wt 167 lb 8 oz (76 kg)   BMI 26.23 kg/m    ----------------    I spent 15 minutes with this patient today. All changes were made via collaborative practice agreement with Poonam Dunbar.     A summary of these recommendations was sent via [employer].    Nina Escalona PharmD, BCACP  MTM Pharmacist   [facility] Gastroenterology   Phone: [phone]    Telemedicine Visit Details  The patient's medications can be safely assessed via a telemedicine encounter.  Type of service:  Telephone visit  Originating Location (pt. Location): Home    Distant Location (provider location):  Off-site  Start Time:  10:05 AM  End Time: 10:20 AM     Medication Therapy Recommendations  No medication therapy recommendations to display     "

## 2025-03-15 NOTE — PATIENT INSTRUCTIONS
"Recommendations from today's MTM visit:                                                       Continue with plan for Stelara 90 mg subcutaneously every 28 days.    Please let us know when you have confirmed your due date with your dating ultrasound. The Stelara dosing timeline as it stands is in my progress note, and we can make adjustments as needed going forward based on what is established as your due date.    Follow-up:    -- 3-6 months for MTM, sooner if needed    It was great speaking with you today.  I value your experience and would be very thankful for your time in providing feedback in our clinic survey. In the next few days, you may receive an email or text message from [facility] [email or web address] with a link to a survey related to your  clinical pharmacist.\"     To schedule another MTM appointment, please call the clinic directly or you may call the MTM scheduling line at [phone].    My Clinical Pharmacist's contact information:                                                      Please feel free to contact me with any questions or concerns you have.      Nina Escalona PharmD, BCACP  MTM Pharmacist   [facility] Gastroenterology   Phone: [phone]    "

## 2025-03-18 ENCOUNTER — TELEPHONE (OUTPATIENT)
Dept: GASTROENTEROLOGY | Facility: CLINIC | Age: 30
End: 2025-03-18
Payer: COMMERCIAL

## 2025-03-18 NOTE — TELEPHONE ENCOUNTER
"Patient Contacted and canceled the following:    Per MarielenaRN (See Below)     \"Hello,     This patient has a visit with Dr. Dunbar coming up, then another visit with Destinee Anguiano two weeks later. She only needs one of the two appointments. Could you please contact the patient and ask which appointment she'd like to keep? Then cancel the other.     Thank you!   Marielena\"       Pt has chosen to cancel 5/13 clinic with ELMER Best     3/18 AA  "

## 2025-04-29 ENCOUNTER — VIRTUAL VISIT (OUTPATIENT)
Dept: GASTROENTEROLOGY | Facility: CLINIC | Age: 30
End: 2025-04-29
Attending: INTERNAL MEDICINE
Payer: COMMERCIAL

## 2025-04-29 DIAGNOSIS — K51.00 ULCERATIVE PANCOLITIS (H): Primary | ICD-10-CM

## 2025-04-29 NOTE — LETTER
4/29/2025      Xochitl Artis  [address]  [facility] [address]      Dear Colleague,    Thank you for referring your patient, Xochitl Artis, to the [facility] [place]. Please see a copy of my visit note below.    Virtual Visit Details    Type of service:  Video Visit     Originating Location (pt. Location): Home    Distant Location (provider location):  On-site  Platform used for Video Visit: [facility] UC follow up       PATIENT: Xochitl Caputo    MRN: [id number]    Date of Birth 1995    Tel: [phone] (home) NONE (work)    PCP: Jillian Tejada MD     HPI: Ms. Caputo is a 29 year old female here for follow up for UC.     UC history  Was sick before she was diagnosed in 2017. She was seen at [facility] prior to this and was told she had IBS and dehydration.  Hgb was 5.9. Switched care to [facility]. Tried Lialda, imuran, Humira. Transitioned to Entyvio     and has been in symptomatic remission from 11/2018-6/2020. Around June, developed a flare in the setting of psychological stress (pandemic, riots, etc). Lost 22 lb, was on 2 courses prednisone (9/17-present). Currently on 20 mg daily.  Had 3 Entyvio infusions on q4w dosing at this point. Had a flex sig 9/16/2020 (below).     Screening appointment for FMT study here at the .   8/2018 established care with [place] [facility] (integrative medicine) which helped. Switched to [facility] about a year ago.     Meds: Entyvio q4w, low dose naltrexone, levothyroxine, spirinolactone (acne)  Supplements: inflamax (powder), fish oil, reversatrol extract, tumeric with black pepper (740 mg), S. Myah, VSL #3     Gets acupuncture and recently started LED light therapy (a belt of LED lights)    Macroscopic extent of disease (most recent) E3    Current UC symptoms    Bowel frequency in day 3-4   Bowel frequency in night none  Urgency of defecation YES occasionally, mild   Blood in stool only  "with wiping   General well being 2 = poor  Extracolonic features (multiple select) none     Constitutional symptoms:  Fever NO  Weight loss YES 22 lb since June     Noteworthy diet history- GFD, very low dairy, avoids eggs, avoid processed foods/sugars. Avoids raw veggies when flaring.     Total number of IBD surgeries (except perianal): 0    Current IBD Medications:  Entyvio q4w  Prednisone 20 mg daily     Past IBD Medications:   Imuran - got PNA, \"didn't feel like myself\"   Humira -- primary nonresponder   Lialda    Interval history, 11/2020 (video visit)  Has been on prednisone 25 mg daily but can't taper off this further (loose, frequent stools and more blood).   Reports no response to Humira in the past; was on this for 2 months or so. Levels were not checked and did not escalate to EW dosing.     Interval history, 12/2020 (video visit)  Currently on 20 mg prednisone daily. Having some blood, but less than before.   Continues on budesonide foam, which is helpful (but had a setback in the s/o of menstruation).   Had Stelara infusion on 11/24/2020.   Met with Dr. Jeffries on 11/23 with goals to lower fat and increase soluble fiber, follow IBD AID diet.     Current UC symptoms  Bowel frequency in day loose stools in the morning (2) and in the evening (1) (not in the afternoon)  Bowel frequency in night gas +/- stool  Urgency of defecation YES occasionally, mild   Blood in stool 50%; alternatives between the toilet and only with wiping   General well being 2 = poor  Extracolonic features (multiple select) none     Interval history, 1/2021 (video visit)  Off prednisone x 3 days. Continues on budesonide foam x .   Has been experiencing constipation and a mildly painful hemorrhoid.  Last Stelara injection was 1/20/2021    Current UC symptoms  Bowel frequency in day 3 (except 5/day with menstruation)  Bowel frequency in night none  Urgency of defecation No  Blood in stool none    General well being 0 = very " well  Extracolonic features (multiple select) none      Interval history, 6/2022 (video visit)  Currently 10 w pregnant and feeling well. No morning sickness. Some fatigue, improved after week #8. FC on 6/6 was 173. Had some blood in stool around 6/17 which resolved with budesonide foam.   Feeling well from a GI standpoint.     Remicade going well. No breakthrough or waning symptoms. In 10/2021, trough level was 14.     Switching ob specialists to [facility] in [place].   Plans to travel to [facility] at around 22 weeks gestation (Sep 2-12)    Current UC symptoms  Bowel frequency in day 2-3   Bowel frequency in night none  Urgency of defecation No  Blood in stool none    General well being 0 = very well  Extracolonic features (multiple select) none       Interval history, 12/2022 (video visit)  Currently 35w4d pregnant.   Continues on Remicade every 8 weeks monotherapy. Last infusion was on 12/7. Next infusion 2/1. Will be induced at 39 weeks on 1/20.     Doing well from a mental health standpoint; continues on sertraline (managed by PCP). Not seeing a therapist currently.      Current UC symptoms  Bowel frequency in day 2-3   Bowel frequency in night none  Urgency of defecation No  Blood in stool none    General well being 0 = very well  Extracolonic features (multiple select) none     Interval history, 1/2024 (video visit)  Just returned from [place], [place]    Feeling well with no GI symptoms.    Current UC symptoms  Bowel frequency in day 2   Bowel frequency in night none  Urgency of defecation No  Blood in stool none    General well being 0 = very well  Extracolonic features (multiple select) abdominal rash that is itchy      Interval history, 2/2025 (video visit)  Felt flat on imuran  Feeling well.    Current UC symptoms  Bowel frequency in day 2 in the morning   Bowel frequency in night none  Urgency of defecation No  Blood in stool none    General well being 0 = very well  Extracolonic features (multiple select)  none      Interval history, 4/2025 (video visit)  14 weeks pregnant. MALGORZATA October 30 (23rd would be 39 weeks)  On Stelara every 4 weeks.     Current UC symptoms  Bowel frequency in day 2   Bowel frequency in night none  Urgency of defecation No  Blood in stool none    General well being 0 = very well  Extracolonic features (multiple select) none      Past Medical History:   Diagnosis Date     Acne      Hypothyroidism      Ulcerative colitis (H)      Ulcerative colitis (H) 10/28/2020        Past Surgical History:   Procedure Laterality Date     COLONOSCOPY N/A 5/11/2021    Procedure: COLONOSCOPY, WITH POLYPECTOMY AND BIOPSY;  Surgeon: Poonam Dunbar MD;  Location: [facility] OR     COLONOSCOPY N/A 11/12/2024    Procedure: COLONOSCOPY, WITH POLYPECTOMY AND BIOPSY;  Surgeon: Poonam Dunbar MD;  [place] [facility] [place]     EYE SURGERY       ORTHOPEDIC SURGERY      right ankle.       Social History     Tobacco Use     Smoking status: Never     Passive exposure: Never     Smokeless tobacco: Never   Substance Use Topics     Alcohol use: Not on file       Family History   Problem Relation Age of Onset     Colitis Maternal Grandfather      Ulcerative Colitis Maternal Grandfather      Crohn's Disease No family hx of      GERD No family hx of      Stomach Cancer No family hx of        No Known Allergies     Outpatient Encounter Medications as of 4/29/2025   Medication Sig Dispense Refill     calcium carbonate (OS-KATIA) 1500 (600 Ca) MG tablet Take 600 mg by mouth daily (Patient not taking: Reported on 3/7/2025)       dhea 25 MG TABS Take 25 mg by mouth daily       fish oil-omega-3 fatty acids 1000 MG capsule Take 2 g by mouth daily       levothyroxine (SYNTHROID/LEVOTHROID) 50 MCG tablet Take 50 mcg by mouth daily       multivitamin w/minerals (THERA-VIT-M) tablet Take 1 tablet by mouth 2 times daily       ustekinumab (STELARA) 90 MG/ML Inject 1 mL (90 mg) subcutaneously every 28 days. 1 mL 5     Vitamin D3 (CHOLECALCIFEROL) 125 MCG  (5000 UT) tablet Take 1 tablet by mouth daily.       No facility-administered encounter medications on file as of 4/29/2025.      NSAID  none    Review of Systems  Complete 10 System ROS performed. All are negative except as documented below, in the HPI, or in patient questionnaire from today's visit.    1) Constitutional: No fevers, chills, night sweats or malaise, weight loss or gain  2) Skin: No rash  3) Pulmonary: No wheeze, SOB, cough, sputum or hemoptysis  4) Cardiovascular: No Chest pain or palpitations  5) Genitourinary: No blood in urine or dysuria  6) Endocrine: No increased sweating, hunger, thirst or thyroid problems  7) Hematologic: No bruising and easy bleeding  8) Musculoskeletal: no new pain in joints or limitation in ROM  9) Neurologic: No dizziness, paresthesias or weakness or falls  10) Psychiatric:  not depressed/anxious, no sleep problems    PHYSICAL EXAM  Vitals: There were no vitals taken for this visit.    No Pain (0)       General appearance  Healthy appearing adult, in no acute distress     Eyes  Sclera anicteric  Pupils round and reactive to light     Ears, nose, mouth and throat  No obvious external lesions of ears and nose  Hearing intact     Neck  Symmetric  No obvious external lesions     Respiratory  Normal respiration, no use of accessory muscles      MSK  Gait normal     Skin  No rashes or jaundice      Psychiatric  Oriented to person, place and time  Appropriate mood and affect.     DATA:  Reviewed in detail past documentation, medications and prior workup available in electronic health records or through outside records.    PERTINENT STUDIES:  Fecal calprotectin 6/2022 173     Most recent CBC:  WBC   Date Value Ref Range Status   11/24/2020 14.6 (H) 4.0 - 11.0 10e9/L Final     WBC Count   Date Value Ref Range Status   01/13/2025 10.0 4.0 - 11.0 10e3/uL Final   ]  Hemoglobin   Date Value Ref Range Status   01/13/2025 14.9 11.7 - 15.7 g/dL Final   11/24/2020 11.2 (L) 11.7 - 15.7  "g/dL Final   ]   Platelet Count   Date Value Ref Range Status   01/13/2025 330 150 - 450 10e3/uL Final   11/24/2020 408 150 - 450 10e9/L Final     Most recent hepatic panel:  AST   Date Value Ref Range Status   01/13/2025 22 0 - 45 U/L Final   11/24/2020 6 0 - 45 U/L Final     ALT   Date Value Ref Range Status   01/13/2025 12 0 - 50 U/L Final   11/24/2020 14 0 - 50 U/L Final     No results found for: \"BILICONJ\"   Bilirubin Total   Date Value Ref Range Status   01/13/2025 0.5 <=1.2 mg/dL Final   11/24/2020 0.2 0.2 - 1.3 mg/dL Final     Albumin   Date Value Ref Range Status   01/13/2025 4.4 3.5 - 5.2 g/dL Final   12/07/2022 2.6 (L) 3.4 - 5.0 g/dL Final   11/24/2020 2.9 (L) 3.4 - 5.0 g/dL Final     Alkaline Phosphatase   Date Value Ref Range Status   01/13/2025 51 40 - 150 U/L Final   11/24/2020 51 40 - 150 U/L Final       Most recent creatinine:  Creatinine   Date Value Ref Range Status   01/13/2025 0.76 0.51 - 0.95 mg/dL Final   05/11/2021 0.68 0.52 - 1.04 mg/dL Final       Endoscopy:     Icscope 5/2021:  Impression:          - The examined portion of the ileum was normal.                        - Moderately active (Tolliver Score 2) ulcerative colitis.                        Biopsied.                        - Mild (Tolliver Score 1) pancolitis. Biopsied.                        - One less than 5 mm polyp in the descending colon,                        removed with a cold biopsy forceps. Resected and                        retrieved.         Flex sig 9/2020 showed Tolliver 3 colitis from rectum to descending colon    icscope 9/2019:  Impression:   - The examined portion of the ileum                        was normal.                       - One 10 to 11 mm polyp in the                        sigmoid colon, removed with a hot                        snare. Resected and retrieved.                       - Pancolitis. Inflammation was found                        from the anus to the cecum. This was                        mild in " severity and graded as Tolliver                        Score 1 (mild disease). Biopsied.      icscope 8/2017 showed E3 colitis (severity unclear): Congested, erythematous and eroded                        mucosa in the entire examined colon.                        Biopsied from a) right colon and b)                        left colon. Findings consistent with                        ulcerative pan colitis.    Specimens:   A) - Colon, , Right colon bx's                                                                      B) - Colon, , Left colon bx's                                                                       C) - Colon, sigmoid                                                                        FINAL DIAGNOSIS A.  Colon, right, biopsy -- Colonic mucosal fragments with minimal crypt architectural distortion and minimal, focal activity    B.  Colon, left, biopsy -- Colonic mucosal fragments with minimal crypt architectural distortion and minimal, focal activity    C.  Colon, sigmoid, polypectomy -- Inflammatory polyp       IMPRESSION:    Xochitl is a 29 year old with pan UC who recently developed ATIs 2/2 self-decreased Remicade frequency. Previously, she was on Humira, Stelara, Entyvio and most recently Simponi q2w. Unfortunately, she developed ADAs to Simponi and therefore needs to switch therapy.  We discussed multiple options today, including Skyrizi, Stelara (she was on this for a very short period of time previously) and Cimzia + Imuran.  We decided to move forward with Stelara, given its low risk for antigenicity and established safety in pregnancy.   Xochitl is currently 14 weeks pregnant and feeling well.     We will proceed with the following:    DIAGNOSIS:  # 14 weeks pregnant  # Pan UC, in clinical remission, but with ADAs to Simponi q2w  # +IFX ATIs     PLAN:  --Continue Stelara every 4 weeks, given history of rapid clearance of biologics.  -----------Adjust Stelara schedule per [facility] pharmacy so that  she is due for her injection at 39 weeks of gestation    --Fecal calprotectin now and every trimester    Increased baby monitoring reviewed today:  --Growth US every month starting at 24 weeks  --Weekly BPP starting at 32 weeks  --Induction at 39 weeks    Misc:  -- Avoid tobacco use  -- Avoid NSAIDs as there is potentially a 25% chance of causing an IBD flare    RTC 3 months with IBD KAYA     30 minutes spent on the date of the encounter performing chart review, history and exam, documentation and further activities as noted above.     Poonam Dunbar MD   of Medicine  Division of Gastroenterology, Hepatology and Nutrition  [facility]      Again, thank you for allowing me to participate in the care of your patient.        Sincerely,        Poonam Dunbar MD    Electronically signed

## 2025-04-29 NOTE — NURSING NOTE
Current patient location: [address] [place] [id number]    Is the patient currently in the state of [place]? YES    Visit mode: VIDEO    If the visit is dropped, the patient can be reconnected by:VIDEO VISIT: Send to e-mail at: [email or web address]    Will anyone else be joining the visit? NO  (If patient encounters technical issues they should call [phone]    Are changes needed to the allergy or medication list? No    Are refills needed on medications prescribed by this physician? NO    Rooming Documentation:  Questionnaire(s) completed    Reason for visit: RECHECK    [place] CRONIN

## 2025-04-29 NOTE — PROGRESS NOTES
Virtual Visit Details    Type of service:  Video Visit     Originating Location (pt. Location): Home    Distant Location (provider location):  On-site  Platform used for Video Visit: [facility] UC follow up       PATIENT: Xochitl Caputo    MRN: [id number]    Date of Birth 1995    Tel: [phone] (home) NONE (work)    PCP: Jillian Tejada MD     HPI: Ms. Caputo is a 29 year old female here for follow up for UC.     UC history  Was sick before she was diagnosed in 2017. She was seen at [facility] prior to this and was told she had IBS and dehydration.  Hgb was 5.9. Switched care to [facility]. Tried Lialda, imuran, Humira. Transitioned to Entyvio     and has been in symptomatic remission from 11/2018-6/2020. Around June, developed a flare in the setting of psychological stress (pandemic, riots, etc). Lost 22 lb, was on 2 courses prednisone (9/17-present). Currently on 20 mg daily.  Had 3 Entyvio infusions on q4w dosing at this point. Had a flex sig 9/16/2020 (below).     Screening appointment for FMT study here at the .   8/2018 established care with [place] [facility] (integrative medicine) which helped. Switched to [facility] about a year ago.     Meds: Entyvio q4w, low dose naltrexone, levothyroxine, spirinolactone (acne)  Supplements: inflamax (powder), fish oil, reversatrol extract, tumeric with black pepper (740 mg), S. Myah, VSL #3     Gets acupuncture and recently started LED light therapy (a belt of LED lights)    Macroscopic extent of disease (most recent) E3    Current UC symptoms    Bowel frequency in day 3-4   Bowel frequency in night none  Urgency of defecation YES occasionally, mild   Blood in stool only with wiping   General well being 2 = poor  Extracolonic features (multiple select) none     Constitutional symptoms:  Fever NO  Weight loss YES 22 lb since June     Noteworthy diet history- GFD, very low dairy, avoids eggs, avoid processed  "foods/sugars. Avoids raw veggies when flaring.     Total number of IBD surgeries (except perianal): 0    Current IBD Medications:  Entyvio q4w  Prednisone 20 mg daily     Past IBD Medications:   Imuran - got PNA, \"didn't feel like myself\"   Humira -- primary nonresponder   Sarah    Interval history, 11/2020 (video visit)  Has been on prednisone 25 mg daily but can't taper off this further (loose, frequent stools and more blood).   Reports no response to Humira in the past; was on this for 2 months or so. Levels were not checked and did not escalate to EW dosing.     Interval history, 12/2020 (video visit)  Currently on 20 mg prednisone daily. Having some blood, but less than before.   Continues on budesonide foam, which is helpful (but had a setback in the s/o of menstruation).   Had Stelara infusion on 11/24/2020.   Met with Dr. Jeffries on 11/23 with goals to lower fat and increase soluble fiber, follow IBD AID diet.     Current UC symptoms  Bowel frequency in day loose stools in the morning (2) and in the evening (1) (not in the afternoon)  Bowel frequency in night gas +/- stool  Urgency of defecation YES occasionally, mild   Blood in stool 50%; alternatives between the toilet and only with wiping   General well being 2 = poor  Extracolonic features (multiple select) none     Interval history, 1/2021 (video visit)  Off prednisone x 3 days. Continues on budesonide foam x .   Has been experiencing constipation and a mildly painful hemorrhoid.  Last Stelara injection was 1/20/2021    Current UC symptoms  Bowel frequency in day 3 (except 5/day with menstruation)  Bowel frequency in night none  Urgency of defecation No  Blood in stool none    General well being 0 = very well  Extracolonic features (multiple select) none      Interval history, 6/2022 (video visit)  Currently 10 w pregnant and feeling well. No morning sickness. Some fatigue, improved after week #8. FC on 6/6 was 173. Had some blood in stool around 6/17 " which resolved with budesonide foam.   Feeling well from a GI standpoint.     Remicade going well. No breakthrough or waning symptoms. In 10/2021, trough level was 14.     Switching ob specialists to [facility] in [place].   Plans to travel to [facility] at around 22 weeks gestation (Sep 2-12)    Current UC symptoms  Bowel frequency in day 2-3   Bowel frequency in night none  Urgency of defecation No  Blood in stool none    General well being 0 = very well  Extracolonic features (multiple select) none       Interval history, 12/2022 (video visit)  Currently 35w4d pregnant.   Continues on Remicade every 8 weeks monotherapy. Last infusion was on 12/7. Next infusion 2/1. Will be induced at 39 weeks on 1/20.     Doing well from a mental health standpoint; continues on sertraline (managed by PCP). Not seeing a therapist currently.      Current UC symptoms  Bowel frequency in day 2-3   Bowel frequency in night none  Urgency of defecation No  Blood in stool none    General well being 0 = very well  Extracolonic features (multiple select) none     Interval history, 1/2024 (video visit)  Just returned from [place], [place]    Feeling well with no GI symptoms.    Current UC symptoms  Bowel frequency in day 2   Bowel frequency in night none  Urgency of defecation No  Blood in stool none    General well being 0 = very well  Extracolonic features (multiple select) abdominal rash that is itchy      Interval history, 2/2025 (video visit)  Felt flat on imuran  Feeling well.    Current UC symptoms  Bowel frequency in day 2 in the morning   Bowel frequency in night none  Urgency of defecation No  Blood in stool none    General well being 0 = very well  Extracolonic features (multiple select) none      Interval history, 4/2025 (video visit)  14 weeks pregnant. MALGORZATA October 30 (23rd would be 39 weeks)  On Stelara every 4 weeks.     Current UC symptoms  Bowel frequency in day 2   Bowel frequency in night none  Urgency of defecation No  Blood in  stool none    General well being 0 = very well  Extracolonic features (multiple select) none      Past Medical History:   Diagnosis Date    Acne     Hypothyroidism     Ulcerative colitis (H)     Ulcerative colitis (H) 10/28/2020        Past Surgical History:   Procedure Laterality Date    COLONOSCOPY N/A 5/11/2021    Procedure: COLONOSCOPY, WITH POLYPECTOMY AND BIOPSY;  Surgeon: Poonam Dunbar MD;  Location: [facility] OR    COLONOSCOPY N/A 11/12/2024    Procedure: COLONOSCOPY, WITH POLYPECTOMY AND BIOPSY;  Surgeon: Poonam Dunbar MD;  Location: [facility] OR    EYE SURGERY      ORTHOPEDIC SURGERY      right ankle.       Social History     Tobacco Use    Smoking status: Never     Passive exposure: Never    Smokeless tobacco: Never   Substance Use Topics    Alcohol use: Not on file       Family History   Problem Relation Age of Onset    Colitis Maternal Grandfather     Ulcerative Colitis Maternal Grandfather     Crohn's Disease No family hx of     GERD No family hx of     Stomach Cancer No family hx of        No Known Allergies     Outpatient Encounter Medications as of 4/29/2025   Medication Sig Dispense Refill    calcium carbonate (OS-KATIA) 1500 (600 Ca) MG tablet Take 600 mg by mouth daily (Patient not taking: Reported on 3/7/2025)      dhea 25 MG TABS Take 25 mg by mouth daily      fish oil-omega-3 fatty acids 1000 MG capsule Take 2 g by mouth daily      levothyroxine (SYNTHROID/LEVOTHROID) 50 MCG tablet Take 50 mcg by mouth daily      multivitamin w/minerals (THERA-VIT-M) tablet Take 1 tablet by mouth 2 times daily      ustekinumab (STELARA) 90 MG/ML Inject 1 mL (90 mg) subcutaneously every 28 days. 1 mL 5    Vitamin D3 (CHOLECALCIFEROL) 125 MCG (5000 UT) tablet Take 1 tablet by mouth daily.       No facility-administered encounter medications on file as of 4/29/2025.      NSAID  none    Review of Systems  Complete 10 System ROS performed. All are negative except as documented below, in the HPI, or in patient  questionnaire from today's visit.    1) Constitutional: No fevers, chills, night sweats or malaise, weight loss or gain  2) Skin: No rash  3) Pulmonary: No wheeze, SOB, cough, sputum or hemoptysis  4) Cardiovascular: No Chest pain or palpitations  5) Genitourinary: No blood in urine or dysuria  6) Endocrine: No increased sweating, hunger, thirst or thyroid problems  7) Hematologic: No bruising and easy bleeding  8) Musculoskeletal: no new pain in joints or limitation in ROM  9) Neurologic: No dizziness, paresthesias or weakness or falls  10) Psychiatric:  not depressed/anxious, no sleep problems    PHYSICAL EXAM  Vitals: There were no vitals taken for this visit.    No Pain (0)       General appearance  Healthy appearing adult, in no acute distress     Eyes  Sclera anicteric  Pupils round and reactive to light     Ears, nose, mouth and throat  No obvious external lesions of ears and nose  Hearing intact     Neck  Symmetric  No obvious external lesions     Respiratory  Normal respiration, no use of accessory muscles      MSK  Gait normal     Skin  No rashes or jaundice      Psychiatric  Oriented to person, place and time  Appropriate mood and affect.     DATA:  Reviewed in detail past documentation, medications and prior workup available in electronic health records or through outside records.    PERTINENT STUDIES:  Fecal calprotectin 6/2022 173     Most recent CBC:  WBC   Date Value Ref Range Status   11/24/2020 14.6 (H) 4.0 - 11.0 10e9/L Final     WBC Count   Date Value Ref Range Status   01/13/2025 10.0 4.0 - 11.0 10e3/uL Final   ]  Hemoglobin   Date Value Ref Range Status   01/13/2025 14.9 11.7 - 15.7 g/dL Final   11/24/2020 11.2 (L) 11.7 - 15.7 g/dL Final   ]   Platelet Count   Date Value Ref Range Status   01/13/2025 330 150 - 450 10e3/uL Final   11/24/2020 408 150 - 450 10e9/L Final     Most recent hepatic panel:  AST   Date Value Ref Range Status   01/13/2025 22 0 - 45 U/L Final   11/24/2020 6 0 - 45 U/L  "Final     ALT   Date Value Ref Range Status   01/13/2025 12 0 - 50 U/L Final   11/24/2020 14 0 - 50 U/L Final     No results found for: \"BILICONJ\"   Bilirubin Total   Date Value Ref Range Status   01/13/2025 0.5 <=1.2 mg/dL Final   11/24/2020 0.2 0.2 - 1.3 mg/dL Final     Albumin   Date Value Ref Range Status   01/13/2025 4.4 3.5 - 5.2 g/dL Final   12/07/2022 2.6 (L) 3.4 - 5.0 g/dL Final   11/24/2020 2.9 (L) 3.4 - 5.0 g/dL Final     Alkaline Phosphatase   Date Value Ref Range Status   01/13/2025 51 40 - 150 U/L Final   11/24/2020 51 40 - 150 U/L Final       Most recent creatinine:  Creatinine   Date Value Ref Range Status   01/13/2025 0.76 0.51 - 0.95 mg/dL Final   05/11/2021 0.68 0.52 - 1.04 mg/dL Final       Endoscopy:     Icscope 5/2021:  Impression:          - The examined portion of the ileum was normal.                        - Moderately active (Tolliver Score 2) ulcerative colitis.                        Biopsied.                        - Mild (Tolliver Score 1) pancolitis. Biopsied.                        - One less than 5 mm polyp in the descending colon,                        removed with a cold biopsy forceps. Resected and                        retrieved.         Flex sig 9/2020 showed Tolliver 3 colitis from rectum to descending colon    icscope 9/2019:  Impression:   - The examined portion of the ileum                        was normal.                       - One 10 to 11 mm polyp in the                        sigmoid colon, removed with a hot                        snare. Resected and retrieved.                       - Pancolitis. Inflammation was found                        from the anus to the cecum. This was                        mild in severity and graded as Tolliver                        Score 1 (mild disease). Biopsied.      icscope 8/2017 showed E3 colitis (severity unclear): Congested, erythematous and eroded                        mucosa in the entire examined colon.                        Biopsied " from a) right colon and b)                        left colon. Findings consistent with                        ulcerative pan colitis.    Specimens:   A) - Colon, , Right colon bx's                                                                      B) - Colon, , Left colon bx's                                                                       C) - Colon, sigmoid                                                                        FINAL DIAGNOSIS A.  Colon, right, biopsy -- Colonic mucosal fragments with minimal crypt architectural distortion and minimal, focal activity    B.  Colon, left, biopsy -- Colonic mucosal fragments with minimal crypt architectural distortion and minimal, focal activity    C.  Colon, sigmoid, polypectomy -- Inflammatory polyp       IMPRESSION:    Xochitl is a 29 year old with pan UC who recently developed ATIs 2/2 self-decreased Remicade frequency. Previously, she was on Humira, Stelara, Entyvio and most recently Simponi q2w. Unfortunately, she developed ADAs to Simponi and therefore needs to switch therapy.  We discussed multiple options today, including Skyrizi, Stelara (she was on this for a very short period of time previously) and Cimzia + Imuran.  We decided to move forward with Stelara, given its low risk for antigenicity and established safety in pregnancy.   Xochitl is currently 14 weeks pregnant and feeling well.     We will proceed with the following:    DIAGNOSIS:  # 14 weeks pregnant  # Pan UC, in clinical remission, but with ADAs to Simponi q2w  # +IFX ATIs     PLAN:  --Continue Stelara every 4 weeks, given history of rapid clearance of biologics.  -----------Adjust Stelara schedule per [facility] pharmacy so that she is due for her injection at 39 weeks of gestation    --Fecal calprotectin now and every trimester    Increased baby monitoring reviewed today:  --Growth US every month starting at 24 weeks  --Weekly BPP starting at 32 weeks  --Induction at 39 weeks    Misc:  --  Avoid tobacco use  -- Avoid NSAIDs as there is potentially a 25% chance of causing an IBD flare    RTC 3 months with IBD KAYA     30 minutes spent on the date of the encounter performing chart review, history and exam, documentation and further activities as noted above.     Poonam Dunbar MD   of Medicine  Division of Gastroenterology, Hepatology and Nutrition  [facility]

## 2025-04-29 NOTE — PATIENT INSTRUCTIONS
PLAN  ---Continue Stelara every 4 weeks  ---Adjust Stelara schedule per [facility] pharmacy    ---Fecal calprotectin every trimester    Recommendations from [facility] regarding baby monitoring:  --Growth US every month starting at 24 weeks  --Weekly BPP starting at 32 weeks  --Induction at 39 weeks

## 2025-05-05 ENCOUNTER — TELEPHONE (OUTPATIENT)
Dept: GASTROENTEROLOGY | Facility: CLINIC | Age: 30
End: 2025-05-05
Payer: COMMERCIAL

## 2025-05-05 NOTE — TELEPHONE ENCOUNTER
Left Voicemail (1st Attempt) and Sent Mychart (1st Attempt) for the patient to call back and schedule the following:    Appointment type: Return  Provider:    Return date: ~ 7/29 Approx.   Specialty phone number: [phone]  Additional appointment(s) needed:   Additonal Notes:     Follow up with IBD KAYA per  - 5/5 AA

## 2025-05-14 NOTE — PROGRESS NOTES
Medication Therapy Management (MTM) Encounter    ASSESSMENT:                            Medication Adherence/Access: No issues identified.    Ulcerative Colitis:  Xochitl would benefit from continued treatment with Stelara every 4 weeks. She would benefit from a dosing schedule adjustment to time Stelara shortly after scheduled induction at 39 weeks. She will be traveling with her toddler at the end of June/ early July so it would be reasonable to push back her dose slightly to allow for timing around travel plans.     She is up to date on routine maintenance labs. She is up to date on annual tuberculosis screening.     PLAN:                            Continue on Stelara every 4 weeks  These will be the dates of your adjusted Stelara schedule: June 3rd, July 5th, August 1st, August 29th, September 26th, October 24th/25th.  Please let us know if there are any changes to your anticipated induction date   You are due for routine maintenance labs and an annual tuberculosis screening. These are standing labs ordered under Dr. Dunbar.. Please complete them at your soonest convenience at any [facility] lab.    Follow-up: 3 months, or sooner as needed    SUBJECTIVE/OBJECTIVE:                          Xochitl Artis is a 29 year old female seen for a follow-up visit.       Reason for visit: Stelara check-in and pregnancy.    Allergies/ADRs: None  Past Medical History: Reviewed in chart  Tobacco: She reports that she has never smoked. She has never been exposed to tobacco smoke. She has never used smokeless tobacco.  Alcohol: none    Medication Adherence/Access: no issues reported.    Ulcerative Colitis:   Stelara 90 mg subcutaneous every 28 days  MVI daily   Vitamin D 125 mcg daily  Calcium daily   Fish oil/DHEA daily     Xochitl reports that she feels good currently and has no concerns medication warren. Xochitl is currently pregnant with her second child. She reports that she has a confirmed due date of October 30th but  plans to be induced at 39 weeks (10/23). She is looking to make adjustments to her Stelara schedule to ensure her Stelara dose is due shortly after delivery.    She will be traveling with her toddler to [place] from - and would prefer to not do an injection during this time due to logistics. She would also prefer to give her dose at home after delivery rather than in the hospital.     Last provider visit: 2025 - Poonam Dunbar MD  Next provider visit: not on file  Last labs completed: 2025  Lab frequency: every 3 months   - standing labs available until 3/15/2026  Next labs due: now  Last TB screenin2023  PDC: 89% (Stelara)    Colonoscopy 2024  Impression:    - The examined portion of the ileum was normal.                          - Inactive (Tolliver Score 0) ulcerative colitis, in                          remission. Biopsied.                          - Three 5 to 6 mm polyps in the ascending colon,                          removed with a cold snare. Resected and retrieved.                          - One 5 mm polyp at the hepatic flexure, removed with                          a cold snare. Resected and retrieved.     Stelara dosing timeline      Date Pregnancy Week Expected Date   3/6 6  Stelara dosed (loading)   3/13 7     3/20 8     3/27 9     4/3 10 4/4   4/10 11      12      13      14 5/3    5/8 15     5/15 16      17      18    6 19  6/3   6/12 20      21      22    7/3 23  7/5   7/10 24      25      26     27    8 28     14 29      30     31  32      33      34     35 9/26   10/2 36     10/9 37     10/16 38    10/23 39 10/24 or 10/25   10/30 40        ----------------    I spent 16 minutes with this patient today. All changes were made via collaborative practice agreement with Poonam Dunbar.     A summary of these recommendations was sent via [employer].    Zita Ramsey PharmD, BCPS  MTM Pharmacist   [facility]  [facility]   Phone: [phone]    Telemedicine Visit Details  The patient's medications can be safely assessed via a telemedicine encounter.  Type of service:  Telephone visit  Originating Location (pt. Location): Home    Distant Location (provider location):  Off-site  Start Time: 10:31 AM  End Time: 10:47 AM     Medication Therapy Recommendations  No medication therapy recommendations to display

## 2025-05-15 ENCOUNTER — VIRTUAL VISIT (OUTPATIENT)
Dept: PHARMACY | Facility: CLINIC | Age: 30
End: 2025-05-15
Attending: INTERNAL MEDICINE
Payer: COMMERCIAL

## 2025-05-15 VITALS — WEIGHT: 168 LBS | HEIGHT: 67 IN | BODY MASS INDEX: 26.37 KG/M2

## 2025-05-15 DIAGNOSIS — K51.00 ULCERATIVE PANCOLITIS WITHOUT COMPLICATION (H): Primary | ICD-10-CM

## 2025-05-15 ASSESSMENT — PAIN SCALES - GENERAL: PAINLEVEL_OUTOF10: NO PAIN (0)

## 2025-05-15 NOTE — NURSING NOTE
Current patient location: Pts work    Is the patient currently in the state of [place]? YES    Visit mode: TELEPHONE    If the visit is dropped, the patient can be reconnected by:TELEPHONE VISIT: Phone number:   Telephone Information:   Mobile [phone]       Will anyone else be joining the visit? NO  (If patient encounters technical issues they should call [phone])    Are changes needed to the allergy or medication list? Yes Pt also takes vitamin c    Are refills needed on medications prescribed by this physician? NO    Rooming Documentation:  Questionnaire(s) not done per department protocol    Reason for visit: Consult    Shania CRONIN

## 2025-05-16 RX ORDER — USTEKINUMAB 90 MG/ML
90 INJECTION, SOLUTION SUBCUTANEOUS
Qty: 1 ML | Refills: 5 | Status: SHIPPED | OUTPATIENT
Start: 2025-05-16

## 2025-05-16 NOTE — PATIENT INSTRUCTIONS
"Recommendations from today's MTM visit:                                                      Continue on Stelara every 4 weeks  These will be the dates of your adjusted Stelara schedule: June 3rd, July 5th, August 1st, August 29th, September 26th, October 24th/25th.  Please let us know if there are any changes to your anticipated induction date   You are due for routine maintenance labs and an annual tuberculosis screening. These are standing labs ordered under Dr. Dunbar.. Please complete them at your soonest convenience at any [facility] lab.    Follow-up: 3 months, or sooner as needed    It was great speaking with you today.  I value your experience and would be very thankful for your time in providing feedback in our clinic survey. In the next few days, you may receive an email or text message from [employer] with a link to a survey related to your  clinical pharmacist.\"     To schedule another MTM appointment, please call the clinic directly or you may call the MTM scheduling line at [phone].    My Clinical Pharmacist's contact information:                                                      Please feel free to contact me with any questions or concerns you have.      Zita Ramsey PharmD, BCPS  MTM Pharmacist   [facility]   Phone: [phone]   "

## (undated) DEVICE — KIT ENDO TURNOVER/PROCEDURE CARRY-ON 101822

## (undated) DEVICE — SOL WATER IRRIG 500ML BOTTLE 2F7113

## (undated) DEVICE — ENDO FORCEP BX CAPTURA PRO SPIKE G50696

## (undated) DEVICE — SPECIMEN CONTAINER 3OZ W/FORMALIN 59901

## (undated) DEVICE — TUBING SUCTION 12"X1/4" N612

## (undated) DEVICE — ENDO FORCEP BX CAPTURA LG SPIKE 2.4MMX230CM G53641

## (undated) DEVICE — SUCTION MANIFOLD NEPTUNE 2 SYS 1 PORT 702-025-000

## (undated) DEVICE — GOWN IMPERVIOUS 2XL BLUE

## (undated) DEVICE — ENDO SNARE EXACTO COLD 9MM LOOP 2.4MMX230CM 00711115

## (undated) DEVICE — ENDO TRAP POLYP E-TRAP 00711099

## (undated) DEVICE — TUBING SUCTION MEDI-VAC 1/4"X20' N620A

## (undated) RX ORDER — FENTANYL CITRATE 50 UG/ML
INJECTION, SOLUTION INTRAMUSCULAR; INTRAVENOUS
Status: DISPENSED
Start: 2021-05-11

## (undated) RX ORDER — FENTANYL CITRATE 50 UG/ML
INJECTION, SOLUTION INTRAMUSCULAR; INTRAVENOUS
Status: DISPENSED
Start: 2024-11-12